# Patient Record
Sex: FEMALE | Race: BLACK OR AFRICAN AMERICAN | NOT HISPANIC OR LATINO | Employment: UNEMPLOYED | URBAN - METROPOLITAN AREA
[De-identification: names, ages, dates, MRNs, and addresses within clinical notes are randomized per-mention and may not be internally consistent; named-entity substitution may affect disease eponyms.]

---

## 2022-06-27 ENCOUNTER — TELEPHONE (OUTPATIENT)
Dept: HEMATOLOGY/ONCOLOGY | Facility: CLINIC | Age: 62
End: 2022-06-27
Payer: MEDICAID

## 2022-06-27 NOTE — TELEPHONE ENCOUNTER
Spoke to the patient regarding referral. Appointment date,time,and location provided. All questions answered. TTRN

## 2022-06-29 ENCOUNTER — OFFICE VISIT (OUTPATIENT)
Dept: HEMATOLOGY/ONCOLOGY | Facility: CLINIC | Age: 62
End: 2022-06-29
Payer: MEDICAID

## 2022-06-29 VITALS — RESPIRATION RATE: 18 BRPM | HEIGHT: 61 IN | BODY MASS INDEX: 27.5 KG/M2 | WEIGHT: 145.69 LBS

## 2022-06-29 DIAGNOSIS — C20 RECTAL CANCER: Primary | ICD-10-CM

## 2022-06-29 PROCEDURE — 3008F BODY MASS INDEX DOCD: CPT | Mod: CPTII,S$GLB,, | Performed by: INTERNAL MEDICINE

## 2022-06-29 PROCEDURE — 99205 PR OFFICE/OUTPT VISIT, NEW, LEVL V, 60-74 MIN: ICD-10-PCS | Mod: S$GLB,,, | Performed by: INTERNAL MEDICINE

## 2022-06-29 PROCEDURE — 3008F PR BODY MASS INDEX (BMI) DOCUMENTED: ICD-10-PCS | Mod: CPTII,S$GLB,, | Performed by: INTERNAL MEDICINE

## 2022-06-29 PROCEDURE — 99205 OFFICE O/P NEW HI 60 MIN: CPT | Mod: S$GLB,,, | Performed by: INTERNAL MEDICINE

## 2022-06-29 RX ORDER — NITROGLYCERIN 0.4 MG/1
0.4 TABLET SUBLINGUAL
COMMUNITY
Start: 2021-07-18 | End: 2023-03-16

## 2022-06-29 RX ORDER — FLUCONAZOLE 200 MG/1
TABLET ORAL
COMMUNITY
Start: 2022-06-28 | End: 2022-08-09

## 2022-06-29 RX ORDER — CIPROFLOXACIN 250 MG/1
250 TABLET, FILM COATED ORAL 2 TIMES DAILY
COMMUNITY
Start: 2022-06-27 | End: 2022-08-09

## 2022-06-29 RX ORDER — HYDROCODONE BITARTRATE AND ACETAMINOPHEN 5; 325 MG/1; MG/1
1 TABLET ORAL EVERY 6 HOURS PRN
COMMUNITY
Start: 2022-06-27 | End: 2022-07-14 | Stop reason: SDUPTHER

## 2022-06-29 RX ORDER — FERROUS SULFATE 324(65)MG
TABLET, DELAYED RELEASE (ENTERIC COATED) ORAL
COMMUNITY
Start: 2022-06-21 | End: 2023-03-16

## 2022-06-29 RX ORDER — NAPROXEN SODIUM 220 MG/1
81 TABLET, FILM COATED ORAL
COMMUNITY
Start: 2021-07-18

## 2022-06-29 NOTE — PROGRESS NOTES
MEDICAL ONCOLOGY INITIAL CONSULTATION NOTE    Patient ID: Yulissa Munguia is a 61 y.o. female.    Chief Complaint: Rectal cancer    HPI:  Patient is a 61-year-old female who was previously diagnosed with rectal cancer about a year back and was seen and evaluated by [facility] with recommendations for chemo XRT for for presumed rectal cancer stage III A. Patient however was reluctant to get any treatment and did not pursue any medical care since then.  She now was referred by her primary care provider after patient was dismissed from [facility].  She presents to our clinic today with her daughters for further evaluation.  She still does not seem to be interested in pursuing chemo or radiation options but would like to have a discussion about her prognosis.  She also reports having a rectal tube placed recently in [place] but I have no records to suggest that it was done recently and for what indication.  She continues to smoke every day and does not seem to actually believe in her cancer diagnosis.      Social History     Socioeconomic History    Marital status:    Tobacco Use    Smoking status: Current Every Day Smoker     Years: 20.00     Types: Cigarettes    Smokeless tobacco: Never Used    Tobacco comment: would be interested in smoking cessation in the future     No past surgical history on file.      Review of systems:  Review of Systems   Constitutional: Positive for activity change and appetite change. Negative for chills, diaphoresis, fatigue and unexpected weight change.   HENT: Negative for congestion, facial swelling, hearing loss, mouth sores, trouble swallowing and voice change.    Eyes: Negative for photophobia, pain, discharge and itching.   Respiratory: Negative for apnea, cough, choking, chest tightness and shortness of breath.    Cardiovascular: Negative for chest pain, palpitations and leg swelling.   Gastrointestinal: Positive for rectal pain. Negative for  abdominal distention, abdominal pain, anal bleeding and blood in stool.   Endocrine: Negative for cold intolerance, heat intolerance, polydipsia and polyphagia.   Genitourinary: Negative for difficulty urinating, dysuria, flank pain and hematuria.   Musculoskeletal: Negative for arthralgias, back pain, joint swelling, myalgias, neck pain and neck stiffness.   Skin: Negative for color change, pallor and wound.   Allergic/Immunologic: Negative for environmental allergies, food allergies and immunocompromised state.   Neurological: Negative for dizziness, seizures, facial asymmetry, speech difficulty, light-headedness, numbness and headaches.   Hematological: Negative for adenopathy. Does not bruise/bleed easily.   Psychiatric/Behavioral: Negative for agitation, behavioral problems, confusion, decreased concentration and sleep disturbance.             Physical Exam  Vitals and nursing note reviewed.   Constitutional:       General: She is not in acute distress.     Appearance: Normal appearance. She is not ill-appearing.   HENT:      Head: Normocephalic and atraumatic.      Nose: No congestion or rhinorrhea.   Eyes:      General: No scleral icterus.     Extraocular Movements: Extraocular movements intact.      Pupils: Pupils are equal, round, and reactive to light.   Cardiovascular:      Rate and Rhythm: Normal rate and regular rhythm.      Pulses: Normal pulses.      Heart sounds: Normal heart sounds. No murmur heard.    No gallop.   Pulmonary:      Effort: Pulmonary effort is normal. No respiratory distress.      Breath sounds: Normal breath sounds. No stridor. No wheezing or rhonchi.   Abdominal:      General: Bowel sounds are normal. There is no distension.      Palpations: There is no mass.      Tenderness: There is no abdominal tenderness. There is no guarding.   Musculoskeletal:         General: No swelling, tenderness, deformity or signs of injury. Normal range of motion.      Cervical back: Normal range of  motion and neck supple. No rigidity. No muscular tenderness.      Right lower leg: No edema.      Left lower leg: No edema.   Skin:     General: Skin is warm.      Coloration: Skin is not jaundiced or pale.      Findings: No bruising or lesion.   Neurological:      General: No focal deficit present.      Mental Status: She is alert and oriented to person, place, and time.      Cranial Nerves: No cranial nerve deficit.      Sensory: No sensory deficit.      Motor: No weakness.      Gait: Gait normal.   Psychiatric:         Mood and Affect: Mood normal.         Behavior: Behavior normal.         Thought Content: Thought content normal.       Vitals:    06/29/22 1145   Resp: 18   Body surface area is 1.69 meters squared.    Assessment/Plan:      ECOG 1     1. Rectal Cancer:    == Initially diagnosed in July 2021 as stage IIIA rectal cancer.  She has not followed and not persued any treatment since then as she is skeptical about chemotherapy and side effects of radiation.  She believes an alternate treatment options which she has likely been doing since that time.  After recent dismissal from [facility] she presents to us for further discussion of treatment and management options.  == I discussed with her that restaging scan would be needed to accurately determine her current cancer stage and based on that would recommend treatment with either palliative or curative intent.  She has neglected rectal cancer over the last 1 year at this time and I am strongly suspecting metastasis is with stage IV disease.  I discussed with her that this is an Oncology Clinic and we would only talk about standard treatment with chemotherapy plus-minus radiation options.  Patient is more interested interested in alternative treatment discussions and I discussed with her that this is not something we do in this clinic.  She voiced understanding but would like to get her PET scan done and then return to clinic for further  discussion.  She is not currently agreeable to chemotherapy but would like to discuss this further once PET scan results are back.     Plan:  PET scan    RTC in 2 weeks for MD visit with labs for f/up    Future Appointments   Date Time Provider Department Center   7/13/2022  9:00 AM [facility]   7/14/2022  9:20 AM Nikunj Herndon MD [facility]         Total time spent in counseling and discussion about further management options including relevant lab work, treatment,  prognosis, medications and intended side effects was more than 60 minutes. More than 50% of the time was spent on counseling and coordination of care.  This includes face to face time and non-face to face time preparing to see the patient (eg, review of tests), Obtaining and/or reviewing separately obtained history, Documenting clinical information in the electronic or other health record, Independently interpreting resultsand communicating results to the patient/family/caregiver, or Care coordination.

## 2022-07-01 ENCOUNTER — TELEPHONE (OUTPATIENT)
Dept: HEMATOLOGY/ONCOLOGY | Facility: CLINIC | Age: 62
End: 2022-07-01
Payer: MEDICAID

## 2022-07-01 PROBLEM — C20 RECTAL CANCER: Status: ACTIVE | Noted: 2022-07-01

## 2022-07-05 ENCOUNTER — TELEPHONE (OUTPATIENT)
Dept: HEMATOLOGY/ONCOLOGY | Facility: CLINIC | Age: 62
End: 2022-07-05
Payer: MEDICAID

## 2022-07-08 DIAGNOSIS — C20 RECTAL CANCER: Primary | ICD-10-CM

## 2022-07-13 ENCOUNTER — CLINICAL SUPPORT (OUTPATIENT)
Dept: HEMATOLOGY/ONCOLOGY | Facility: CLINIC | Age: 62
End: 2022-07-13
Payer: MEDICAID

## 2022-07-13 DIAGNOSIS — C20 RECTAL CANCER: ICD-10-CM

## 2022-07-13 LAB
ALBUMIN SERPL BCP-MCNC: 3.3 G/DL (ref 3.4–5)
ALBUMIN/GLOBULIN RATIO: 0.85 RATIO (ref 1.1–1.8)
ALP SERPL-CCNC: 67 U/L (ref 46–116)
ALT SERPL W P-5'-P-CCNC: 23 U/L (ref 12–78)
ANION GAP SERPL CALC-SCNC: 4 MMOL/L (ref 3–11)
ANISOCYTOSIS: ABNORMAL
AST SERPL-CCNC: 18 U/L (ref 15–37)
BANDS: 1 % (ref 0–5)
BILIRUB SERPL-MCNC: 0.2 MG/DL (ref 0–1)
BUN SERPL-MCNC: 8 MG/DL (ref 7–18)
BUN/CREAT SERPL: 13.55 RATIO (ref 7–18)
CALCIUM SERPL-MCNC: 9.1 MG/DL (ref 8.8–10.5)
CELLS COUNTED: 100
CHLORIDE SERPL-SCNC: 106 MMOL/L (ref 100–108)
CO2 SERPL-SCNC: 29 MMOL/L (ref 21–32)
CREAT SERPL-MCNC: 0.59 MG/DL (ref 0.55–1.02)
EOSINOPHIL NFR BLD: 9 % (ref 1–3)
ERYTHROCYTE [DISTWIDTH] IN BLOOD BY AUTOMATED COUNT: 20.8 % (ref 12.5–18)
GFR ESTIMATION: > 60
GLOBULIN: 3.9 G/DL (ref 2.3–3.5)
GLUCOSE SERPL-MCNC: 87 MG/DL (ref 70–110)
HCT VFR BLD AUTO: 26.8 % (ref 37–47)
HGB BLD-MCNC: 8.1 G/DL (ref 12–16)
HYPOCHROMIA BLD QL SMEAR: ABNORMAL
LYMPHOCYTES NFR BLD: 25 % (ref 25–40)
MCH RBC QN AUTO: 23.1 PG (ref 27–31.2)
MCHC RBC AUTO-ENTMCNC: 30.2 G/DL (ref 31.8–35.4)
MCV RBC AUTO: 76.6 FL (ref 80–97)
MICROCYTES BLD QL SMEAR: ABNORMAL
MONOCYTES NFR BLD: 12 % (ref 1–15)
NEUTROPHILS # BLD AUTO: 2.9 10*3/UL (ref 1.8–7.7)
NEUTROPHILS NFR BLD: 53 % (ref 37–80)
NUCLEATED RED BLOOD CELLS: 0 %
PLATELETS: 438 10*3/UL (ref 142–424)
POTASSIUM SERPL-SCNC: 4 MMOL/L (ref 3.6–5.2)
PROT SERPL-MCNC: 7.2 G/DL (ref 6.4–8.2)
RBC # BLD AUTO: 3.5 10*6/UL (ref 4.2–5.4)
SMALL PLATELETS BLD QL SMEAR: ADEQUATE
SODIUM BLD-SCNC: 139 MMOL/L (ref 135–145)
TARGETS: ABNORMAL
WBC # BLD: 5.4 10*3/UL (ref 4.6–10.2)

## 2022-07-14 ENCOUNTER — TELEPHONE (OUTPATIENT)
Dept: HEMATOLOGY/ONCOLOGY | Facility: CLINIC | Age: 62
End: 2022-07-14

## 2022-07-14 ENCOUNTER — OFFICE VISIT (OUTPATIENT)
Dept: HEMATOLOGY/ONCOLOGY | Facility: CLINIC | Age: 62
End: 2022-07-14
Payer: MEDICAID

## 2022-07-14 VITALS
WEIGHT: 148 LBS | SYSTOLIC BLOOD PRESSURE: 131 MMHG | BODY MASS INDEX: 27.94 KG/M2 | RESPIRATION RATE: 18 BRPM | DIASTOLIC BLOOD PRESSURE: 72 MMHG | OXYGEN SATURATION: 98 % | TEMPERATURE: 98 F | HEART RATE: 72 BPM | HEIGHT: 61 IN

## 2022-07-14 DIAGNOSIS — C20 RECTAL CANCER: Primary | ICD-10-CM

## 2022-07-14 DIAGNOSIS — K62.89 RECTAL PAIN: ICD-10-CM

## 2022-07-14 PROCEDURE — 3075F PR MOST RECENT SYSTOLIC BLOOD PRESS GE 130-139MM HG: ICD-10-PCS | Mod: CPTII,S$GLB,, | Performed by: INTERNAL MEDICINE

## 2022-07-14 PROCEDURE — 3075F SYST BP GE 130 - 139MM HG: CPT | Mod: CPTII,S$GLB,, | Performed by: INTERNAL MEDICINE

## 2022-07-14 PROCEDURE — 99215 PR OFFICE/OUTPT VISIT, EST, LEVL V, 40-54 MIN: ICD-10-PCS | Mod: S$GLB,,, | Performed by: INTERNAL MEDICINE

## 2022-07-14 PROCEDURE — 3008F BODY MASS INDEX DOCD: CPT | Mod: CPTII,S$GLB,, | Performed by: INTERNAL MEDICINE

## 2022-07-14 PROCEDURE — 1159F MED LIST DOCD IN RCRD: CPT | Mod: CPTII,S$GLB,, | Performed by: INTERNAL MEDICINE

## 2022-07-14 PROCEDURE — 3078F PR MOST RECENT DIASTOLIC BLOOD PRESSURE < 80 MM HG: ICD-10-PCS | Mod: CPTII,S$GLB,, | Performed by: INTERNAL MEDICINE

## 2022-07-14 PROCEDURE — 99215 OFFICE O/P EST HI 40 MIN: CPT | Mod: S$GLB,,, | Performed by: INTERNAL MEDICINE

## 2022-07-14 PROCEDURE — 1159F PR MEDICATION LIST DOCUMENTED IN MEDICAL RECORD: ICD-10-PCS | Mod: CPTII,S$GLB,, | Performed by: INTERNAL MEDICINE

## 2022-07-14 PROCEDURE — 3008F PR BODY MASS INDEX (BMI) DOCUMENTED: ICD-10-PCS | Mod: CPTII,S$GLB,, | Performed by: INTERNAL MEDICINE

## 2022-07-14 PROCEDURE — 3078F DIAST BP <80 MM HG: CPT | Mod: CPTII,S$GLB,, | Performed by: INTERNAL MEDICINE

## 2022-07-14 RX ORDER — HYDROCODONE BITARTRATE AND ACETAMINOPHEN 5; 325 MG/1; MG/1
1 TABLET ORAL EVERY 8 HOURS PRN
Qty: 30 TABLET | Refills: 0 | Status: SHIPPED | OUTPATIENT
Start: 2022-07-14 | End: 2022-07-25 | Stop reason: SDUPTHER

## 2022-07-14 RX ORDER — HYDROCODONE BITARTRATE AND ACETAMINOPHEN 5; 325 MG/1; MG/1
1 TABLET ORAL EVERY 8 HOURS PRN
Qty: 30 TABLET | Refills: 0 | Status: SHIPPED | OUTPATIENT
Start: 2022-07-14 | End: 2022-07-14 | Stop reason: SDUPTHER

## 2022-07-14 NOTE — PROGRESS NOTES
MEDICAL ONCOLOGY FOLLOW UP CONSULTATION NOTE    Patient ID: Yulissa Munguia is a 61 y.o. female.    Chief Complaint: Rectal cancer    HPI:  Patient is a 61-year-old female who was previously diagnosed with rectal cancer about a year back and was seen and evaluated by [facility] with recommendations for chemo XRT for for presumed rectal cancer stage III A. Patient however was reluctant to get any treatment and did not pursue any medical care since then.  She now was referred by her primary care provider after patient was dismissed from [facility].  She presents to our clinic today with her daughters for further evaluation.  She still does not seem to be interested in pursuing chemo or radiation options but would like to have a discussion about her prognosis.  She also reports having a rectal tube placed recently in [place] but I have no records to suggest that it was done recently and for what indication.  She continues to smoke every day and does not seem to actually believe in her cancer diagnosis.      Social History     Socioeconomic History    Marital status:    Tobacco Use    Smoking status: Current Every Day Smoker     Years: 20.00     Types: Cigarettes    Smokeless tobacco: Never Used    Tobacco comment: would be interested in smoking cessation in the future   Substance and Sexual Activity    Alcohol use: Not Currently    Drug use: Not Currently     Past Surgical History:   Procedure Laterality Date    ADENOIDECTOMY       SECTION      TONSILLECTOMY           Review of systems:  Review of Systems   Constitutional: Positive for activity change and appetite change. Negative for chills, diaphoresis, fatigue and unexpected weight change.   HENT: Negative for congestion, facial swelling, hearing loss, mouth sores, trouble swallowing and voice change.    Eyes: Negative for photophobia, pain, discharge and itching.   Respiratory: Negative for apnea, cough,  choking, chest tightness and shortness of breath.    Cardiovascular: Negative for chest pain, palpitations and leg swelling.   Gastrointestinal: Positive for rectal pain. Negative for abdominal distention, abdominal pain, anal bleeding and blood in stool.   Endocrine: Negative for cold intolerance, heat intolerance, polydipsia and polyphagia.   Genitourinary: Negative for difficulty urinating, dysuria, flank pain and hematuria.   Musculoskeletal: Negative for arthralgias, back pain, joint swelling, myalgias, neck pain and neck stiffness.   Skin: Negative for color change, pallor and wound.   Allergic/Immunologic: Negative for environmental allergies, food allergies and immunocompromised state.   Neurological: Negative for dizziness, seizures, facial asymmetry, speech difficulty, light-headedness, numbness and headaches.   Hematological: Negative for adenopathy. Does not bruise/bleed easily.   Psychiatric/Behavioral: Negative for agitation, behavioral problems, confusion, decreased concentration and sleep disturbance.             Physical Exam  Vitals and nursing note reviewed.   Constitutional:       General: She is not in acute distress.     Appearance: Normal appearance. She is not ill-appearing.   HENT:      Head: Normocephalic and atraumatic.      Nose: No congestion or rhinorrhea.   Eyes:      General: No scleral icterus.     Extraocular Movements: Extraocular movements intact.      Pupils: Pupils are equal, round, and reactive to light.   Cardiovascular:      Rate and Rhythm: Normal rate and regular rhythm.      Pulses: Normal pulses.      Heart sounds: Normal heart sounds. No murmur heard.    No gallop.   Pulmonary:      Effort: Pulmonary effort is normal. No respiratory distress.      Breath sounds: Normal breath sounds. No stridor. No wheezing or rhonchi.   Abdominal:      General: Bowel sounds are normal. There is no distension.      Palpations: There is no mass.      Tenderness: There is no abdominal  tenderness. There is no guarding.   Musculoskeletal:         General: No swelling, tenderness, deformity or signs of injury. Normal range of motion.      Cervical back: Normal range of motion and neck supple. No rigidity. No muscular tenderness.      Right lower leg: No edema.      Left lower leg: No edema.   Skin:     General: Skin is warm.      Coloration: Skin is not jaundiced or pale.      Findings: No bruising or lesion.   Neurological:      General: No focal deficit present.      Mental Status: She is alert and oriented to person, place, and time.      Cranial Nerves: No cranial nerve deficit.      Sensory: No sensory deficit.      Motor: No weakness.      Gait: Gait normal.   Psychiatric:         Mood and Affect: Mood normal.         Behavior: Behavior normal.         Thought Content: Thought content normal.       Vitals:    07/14/22 0934   BP: 131/72   Pulse: 72   Resp: 18   Temp: 98 °F (36.7 °C)   Body surface area is 1.7 meters squared.    Assessment/Plan:      ECOG 1     1. Rectal Cancer:    == Initially diagnosed in July 2021 as stage IIIA rectal cancer.  She has not followed and not pursued any treatment since then as she is skeptical about chemotherapy and side effects of radiation.  She believes an alternate treatment options which she has likely been doing since that time.  After recent dismissal from [facility] she presents to us for further discussion of treatment and management options.  == I discussed with her that restaging scan would be needed to accurately determine her current cancer stage and based on that would recommend treatment with either palliative or curative intent.  She has neglected rectal cancer over the last 1 year at this time and I am strongly suspecting metastasis is with stage IV disease.  I discussed with her that this is an Oncology Clinic and we would only talk about standard treatment with chemotherapy plus-minus radiation options.  Patient is more  interested interested in alternative treatment discussions and I discussed with her that this is not something we do in this clinic.  She voiced understanding but would like to get her PET scan done and then return to clinic for further discussion.  She is not currently agreeable to chemotherapy but would like to discuss this further once PET scan results are back.  == 7/14/22:  Patient underwent PET scan 07/08/2022 which showed continued thickening of the rectal wall extending into the anal region with increased radiotracer uptake.  This continues to extend over about 6 cm in length.  Increased radiotracer uptake remains present in the area with an SUV of approximately 22.5 compared 24.4 on the previous examination.  Indistinctness of the fat planes posteriorly remain present consistent with extra colonic extension.  There continues to be hypermetabolic lymph nodes in the inguinal region bilaterally short axis diameter is on the order of about 12 mm are present with SUVs on the order of approximately 3.4.  A mildly hypermetabolic lymph node remains present along the left sidewall of the pelvis having short axis diameter of approximately 7 mm today versus 10 mm on the previous examination.  The SUV in this area is approximately 2.1 versus 5.8 on previous exam. Considering her original diagnosis as Stage IIIA with outside oncologist, this PET scan shows it is likely, T4a with extracolonic extension and N1b with 2-3 regional LN involvement. I discussed with her about chemo-XRT  and would like to talk to radiation oncology first before she decides to pursue any treatment. I will hence make referral to Rad-onc in this regard       2. Pain Management:    == I discussed with her that we have a very strict policy for pain medications and she will need to sign a pain contract. If she decides to pursue treatment for her rectal cancer then she can be started on pain medications. XRT could also help in this regard  == Norco  10/325 mg PO BID prn is what she seems to be taking at this time, after her last prescription from her ER visit.      Plan:  Referral to Rad-oncology  Port placement      RTC in 1 week  for MD/NP  visit with labs for chemo education      Total time spent in counseling and discussion about further management options including relevant lab work, treatment,  prognosis, medications and intended side effects was more than 60 minutes. More than 50% of the time was spent on counseling and coordination of care.  This includes face to face time and non-face to face time preparing to see the patient (eg, review of tests), Obtaining and/or reviewing separately obtained history, Documenting clinical information in the electronic or other health record, Independently interpreting resultsand communicating results to the patient/family/caregiver, or Care coordination.

## 2022-07-21 ENCOUNTER — OFFICE VISIT (OUTPATIENT)
Dept: HEMATOLOGY/ONCOLOGY | Facility: CLINIC | Age: 62
End: 2022-07-21
Payer: MEDICAID

## 2022-07-21 VITALS
DIASTOLIC BLOOD PRESSURE: 66 MMHG | OXYGEN SATURATION: 99 % | HEIGHT: 61 IN | TEMPERATURE: 98 F | HEART RATE: 80 BPM | BODY MASS INDEX: 27.43 KG/M2 | RESPIRATION RATE: 18 BRPM | WEIGHT: 145.31 LBS | SYSTOLIC BLOOD PRESSURE: 100 MMHG

## 2022-07-21 DIAGNOSIS — C20 RECTAL CANCER: Primary | ICD-10-CM

## 2022-07-21 DIAGNOSIS — K62.89 RECTAL PAIN: ICD-10-CM

## 2022-07-21 LAB
BARBITURATE SCREEN URINE: NEGATIVE
BENZODIAZ UR QL SCN: NEGATIVE
BENZOYLECGONINE, URINE: NEGATIVE
CANNABINOID SCREEN URINE: POSITIVE
CLARITHRO TITR SBT: NEGATIVE {TITER}
METHADONE UR QL SCN: NEGATIVE
OPIATES UR QL SCN: POSITIVE
PCP UR QL SCN: NEGATIVE
PH, URINE (TOX): 6.5 (ref 5–8)

## 2022-07-21 PROCEDURE — 3078F PR MOST RECENT DIASTOLIC BLOOD PRESSURE < 80 MM HG: ICD-10-PCS | Mod: CPTII,S$GLB,, | Performed by: NURSE PRACTITIONER

## 2022-07-21 PROCEDURE — 1159F MED LIST DOCD IN RCRD: CPT | Mod: CPTII,S$GLB,, | Performed by: NURSE PRACTITIONER

## 2022-07-21 PROCEDURE — 3008F BODY MASS INDEX DOCD: CPT | Mod: CPTII,S$GLB,, | Performed by: NURSE PRACTITIONER

## 2022-07-21 PROCEDURE — 3078F DIAST BP <80 MM HG: CPT | Mod: CPTII,S$GLB,, | Performed by: NURSE PRACTITIONER

## 2022-07-21 PROCEDURE — 3008F PR BODY MASS INDEX (BMI) DOCUMENTED: ICD-10-PCS | Mod: CPTII,S$GLB,, | Performed by: NURSE PRACTITIONER

## 2022-07-21 PROCEDURE — 99214 OFFICE O/P EST MOD 30 MIN: CPT | Mod: S$GLB,,, | Performed by: NURSE PRACTITIONER

## 2022-07-21 PROCEDURE — 99214 PR OFFICE/OUTPT VISIT, EST, LEVL IV, 30-39 MIN: ICD-10-PCS | Mod: S$GLB,,, | Performed by: NURSE PRACTITIONER

## 2022-07-21 PROCEDURE — 3074F PR MOST RECENT SYSTOLIC BLOOD PRESSURE < 130 MM HG: ICD-10-PCS | Mod: CPTII,S$GLB,, | Performed by: NURSE PRACTITIONER

## 2022-07-21 PROCEDURE — 3074F SYST BP LT 130 MM HG: CPT | Mod: CPTII,S$GLB,, | Performed by: NURSE PRACTITIONER

## 2022-07-21 PROCEDURE — 1159F PR MEDICATION LIST DOCUMENTED IN MEDICAL RECORD: ICD-10-PCS | Mod: CPTII,S$GLB,, | Performed by: NURSE PRACTITIONER

## 2022-07-21 NOTE — PROGRESS NOTES
MEDICAL ONCOLOGY FOLLOW UP CONSULTATION NOTE    Patient ID: Yulissa Munguia is a 61 y.o. female.    Chief Complaint: Rectal cancer    HPI:  Patient is a 61-year-old female who was previously diagnosed with rectal cancer about a year back and was seen and evaluated by [facility] with recommendations for chemo XRT for for presumed rectal cancer stage III A. Patient however was reluctant to get any treatment and did not pursue any medical care since then.  She now was referred by her primary care provider after patient was dismissed from [facility].  She presents to our clinic today with her daughters for further evaluation.  She still does not seem to be interested in pursuing chemo or radiation options but would like to have a discussion about her prognosis.  She also reports having a rectal tube placed recently in [place] but I have no records to suggest that it was done recently and for what indication.  She continues to smoke every day and does not seem to actually believe in her cancer diagnosis.      Social History     Socioeconomic History    Marital status:    Tobacco Use    Smoking status: Current Every Day Smoker     Years: 20.00     Types: Cigarettes    Smokeless tobacco: Never Used    Tobacco comment: would be interested in smoking cessation in the future   Substance and Sexual Activity    Alcohol use: Not Currently    Drug use: Not Currently     Past Surgical History:   Procedure Laterality Date    ADENOIDECTOMY       SECTION      TONSILLECTOMY           Review of systems:  Review of Systems   Constitutional: Positive for activity change and appetite change. Negative for chills, diaphoresis, fatigue and unexpected weight change.   HENT: Negative for congestion, facial swelling, hearing loss, mouth sores, trouble swallowing and voice change.    Eyes: Negative for photophobia, pain, discharge and itching.   Respiratory: Negative for apnea, cough,  choking, chest tightness and shortness of breath.    Cardiovascular: Negative for chest pain, palpitations and leg swelling.   Gastrointestinal: Positive for rectal pain. Negative for abdominal distention, abdominal pain, anal bleeding and blood in stool.   Endocrine: Negative for cold intolerance, heat intolerance, polydipsia and polyphagia.   Genitourinary: Negative for difficulty urinating, dysuria, flank pain and hematuria.   Musculoskeletal: Negative for arthralgias, back pain, joint swelling, myalgias, neck pain and neck stiffness.   Skin: Negative for color change, pallor and wound.   Allergic/Immunologic: Negative for environmental allergies, food allergies and immunocompromised state.   Neurological: Negative for dizziness, seizures, facial asymmetry, speech difficulty, light-headedness, numbness and headaches.   Hematological: Negative for adenopathy. Does not bruise/bleed easily.   Psychiatric/Behavioral: Negative for agitation, behavioral problems, confusion, decreased concentration and sleep disturbance.             Physical Exam  Vitals and nursing note reviewed.   Constitutional:       General: She is not in acute distress.     Appearance: Normal appearance. She is not ill-appearing.   HENT:      Head: Normocephalic and atraumatic.      Nose: No congestion or rhinorrhea.   Eyes:      General: No scleral icterus.     Extraocular Movements: Extraocular movements intact.      Pupils: Pupils are equal, round, and reactive to light.   Cardiovascular:      Rate and Rhythm: Normal rate and regular rhythm.      Pulses: Normal pulses.      Heart sounds: Normal heart sounds. No murmur heard.    No gallop.   Pulmonary:      Effort: Pulmonary effort is normal. No respiratory distress.      Breath sounds: Normal breath sounds. No stridor. No wheezing or rhonchi.   Abdominal:      General: Bowel sounds are normal. There is no distension.      Palpations: There is no mass.      Tenderness: There is no abdominal  tenderness. There is no guarding.   Musculoskeletal:         General: No swelling, tenderness, deformity or signs of injury. Normal range of motion.      Cervical back: Normal range of motion and neck supple. No rigidity. No muscular tenderness.      Right lower leg: No edema.      Left lower leg: No edema.   Skin:     General: Skin is warm.      Coloration: Skin is not jaundiced or pale.      Findings: No bruising or lesion.   Neurological:      General: No focal deficit present.      Mental Status: She is alert and oriented to person, place, and time.      Cranial Nerves: No cranial nerve deficit.      Sensory: No sensory deficit.      Motor: No weakness.      Gait: Gait normal.   Psychiatric:         Mood and Affect: Mood normal.         Behavior: Behavior normal.         Thought Content: Thought content normal.       There were no vitals filed for this visit.There is no height or weight on file to calculate BSA.    Assessment/Plan:      ECOG 1     1. Rectal Cancer:    == Initially diagnosed in July 2021 as stage IIIA rectal cancer.  She has not followed and not pursued any treatment since then as she is skeptical about chemotherapy and side effects of radiation.  She believes an alternate treatment options which she has likely been doing since that time.  After recent dismissal from [facility] she presents to us for further discussion of treatment and management options.  == I discussed with her that restaging scan would be needed to accurately determine her current cancer stage and based on that would recommend treatment with either palliative or curative intent.  She has neglected rectal cancer over the last 1 year at this time and I am strongly suspecting metastasis is with stage IV disease.  I discussed with her that this is an Oncology Clinic and we would only talk about standard treatment with chemotherapy plus-minus radiation options.  Patient is more interested interested in alternative  treatment discussions and I discussed with her that this is not something we do in this clinic.  She voiced understanding but would like to get her PET scan done and then return to clinic for further discussion.  She is not currently agreeable to chemotherapy but would like to discuss this further once PET scan results are back.  == 7/14/22:  Patient underwent PET scan 07/08/2022 which showed continued thickening of the rectal wall extending into the anal region with increased radiotracer uptake.  This continues to extend over about 6 cm in length.  Increased radiotracer uptake remains present in the area with an SUV of approximately 22.5 compared 24.4 on the previous examination.  Indistinctness of the fat planes posteriorly remain present consistent with extra colonic extension.  There continues to be hypermetabolic lymph nodes in the inguinal region bilaterally short axis diameter is on the order of about 12 mm are present with SUVs on the order of approximately 3.4.  A mildly hypermetabolic lymph node remains present along the left sidewall of the pelvis having short axis diameter of approximately 7 mm today versus 10 mm on the previous examination.  The SUV in this area is approximately 2.1 versus 5.8 on previous exam. Considering her original diagnosis as Stage IIIA with outside oncologist, this PET scan shows it is likely, T4a with extracolonic extension and N1b with 2-3 regional LN involvement. I discussed with her about chemo-XRT  and would like to talk to radiation oncology first before she decides to pursue any treatment. I will hence make referral to Rad-onc in this regard  ==07/21/2022: Patient reports that she would like to pursue chemoradiaton. She has appointment today for radiation evaluation with Dr. Contreras. I discussed with Dr. Herndon and if she is candidate for radiation plan is for concurrent XRT with 5-fu. She will need mediport placement. Referral placed.       2. Pain Management:    == I  discussed with her that we have a very strict policy for pain medications and she will need to sign a pain contract. If she decides to pursue treatment for her rectal cancer then she can be started on pain medications. XRT could also help in this regard  == Norco 10/325 mg PO BID prn is what she seems to be taking at this time, after her last prescription from her ER visit.      Plan:  Referral to Rad-oncology  Port placement  RTC 1 week for chemo education for 5-fu pending XRT evaluation    RTC in 1 week  for MD/NP  visit with labs for chemo education      Total time spent in counseling and discussion about further management options including relevant lab work, treatment,  prognosis, medications and intended side effects was more than 60 minutes. More than 50% of the time was spent on counseling and coordination of care.  This includes face to face time and non-face to face time preparing to see the patient (eg, review of tests), Obtaining and/or reviewing separately obtained history, Documenting clinical information in the electronic or other health record, Independently interpreting resultsand communicating results to the patient/family/caregiver, or Care coordination.

## 2022-07-25 ENCOUNTER — TELEPHONE (OUTPATIENT)
Dept: HEMATOLOGY/ONCOLOGY | Facility: CLINIC | Age: 62
End: 2022-07-25
Payer: MEDICAID

## 2022-07-25 DIAGNOSIS — C20 RECTAL CANCER: ICD-10-CM

## 2022-07-25 DIAGNOSIS — K62.89 RECTAL PAIN: ICD-10-CM

## 2022-07-25 RX ORDER — HYDROCODONE BITARTRATE AND ACETAMINOPHEN 5; 325 MG/1; MG/1
1 TABLET ORAL EVERY 8 HOURS PRN
Qty: 30 TABLET | Refills: 0 | Status: SHIPPED | OUTPATIENT
Start: 2022-07-25 | End: 2022-08-04 | Stop reason: SDUPTHER

## 2022-07-25 NOTE — TELEPHONE ENCOUNTER
Called patient to let her know that her Rx was filled and she can call [facility] to see if it's ready for . Patient voiced understanding.

## 2022-07-28 ENCOUNTER — OFFICE VISIT (OUTPATIENT)
Dept: HEMATOLOGY/ONCOLOGY | Facility: CLINIC | Age: 62
End: 2022-07-28
Payer: MEDICAID

## 2022-07-28 ENCOUNTER — SPECIALTY PHARMACY (OUTPATIENT)
Dept: PHARMACY | Facility: CLINIC | Age: 62
End: 2022-07-28
Payer: MEDICAID

## 2022-07-28 VITALS
HEIGHT: 61 IN | DIASTOLIC BLOOD PRESSURE: 68 MMHG | WEIGHT: 148 LBS | SYSTOLIC BLOOD PRESSURE: 108 MMHG | OXYGEN SATURATION: 98 % | BODY MASS INDEX: 27.94 KG/M2 | HEART RATE: 82 BPM | RESPIRATION RATE: 16 BRPM

## 2022-07-28 DIAGNOSIS — K62.89 RECTAL PAIN: ICD-10-CM

## 2022-07-28 DIAGNOSIS — C20 RECTAL CANCER: Primary | ICD-10-CM

## 2022-07-28 PROCEDURE — 1159F MED LIST DOCD IN RCRD: CPT | Mod: CPTII,S$GLB,, | Performed by: NURSE PRACTITIONER

## 2022-07-28 PROCEDURE — 3008F PR BODY MASS INDEX (BMI) DOCUMENTED: ICD-10-PCS | Mod: CPTII,S$GLB,, | Performed by: NURSE PRACTITIONER

## 2022-07-28 PROCEDURE — 99215 OFFICE O/P EST HI 40 MIN: CPT | Mod: S$GLB,,, | Performed by: NURSE PRACTITIONER

## 2022-07-28 PROCEDURE — 1159F PR MEDICATION LIST DOCUMENTED IN MEDICAL RECORD: ICD-10-PCS | Mod: CPTII,S$GLB,, | Performed by: NURSE PRACTITIONER

## 2022-07-28 PROCEDURE — 3008F BODY MASS INDEX DOCD: CPT | Mod: CPTII,S$GLB,, | Performed by: NURSE PRACTITIONER

## 2022-07-28 PROCEDURE — 99215 PR OFFICE/OUTPT VISIT, EST, LEVL V, 40-54 MIN: ICD-10-PCS | Mod: S$GLB,,, | Performed by: NURSE PRACTITIONER

## 2022-07-28 PROCEDURE — 3074F SYST BP LT 130 MM HG: CPT | Mod: CPTII,S$GLB,, | Performed by: NURSE PRACTITIONER

## 2022-07-28 PROCEDURE — 3074F PR MOST RECENT SYSTOLIC BLOOD PRESSURE < 130 MM HG: ICD-10-PCS | Mod: CPTII,S$GLB,, | Performed by: NURSE PRACTITIONER

## 2022-07-28 PROCEDURE — 3078F DIAST BP <80 MM HG: CPT | Mod: CPTII,S$GLB,, | Performed by: NURSE PRACTITIONER

## 2022-07-28 PROCEDURE — 3078F PR MOST RECENT DIASTOLIC BLOOD PRESSURE < 80 MM HG: ICD-10-PCS | Mod: CPTII,S$GLB,, | Performed by: NURSE PRACTITIONER

## 2022-07-28 RX ORDER — CAPECITABINE 500 MG/1
825 TABLET, FILM COATED ORAL 2 TIMES DAILY
Qty: 150 TABLET | Refills: 0 | Status: SHIPPED | OUTPATIENT
Start: 2022-07-28 | End: 2022-09-02 | Stop reason: SDUPTHER

## 2022-07-28 NOTE — TELEPHONE ENCOUNTER
Call from Nurse Madison about an Urgent prescription for Xeloda needed for 8/1. Informed nurse [facility] cannot guarantee it would be able to get processed, but would work on prescription urgently once sent over and follow-up this afternoon.

## 2022-07-28 NOTE — TELEPHONE ENCOUNTER
Specialty Pharmacy - Initial Clinical Assessment    Specialty Medication Orders Linked to Encounter    Flowsheet Row Most Recent Value   Medication #1 capecitabine (XELODA) 500 MG Tab (Order#[id number], Rx#[id number])        Patient Diagnosis   C20 - Rectal cancer    Carolina Munguia is a 61 y.o. female, who is followed by the specialty pharmacy service for management and education.    Recent Encounters     Date Type Provider Description    07/28/2022 Specialty Pharmacy Sarika Sahni, [place] Initial Clinical Assessment    07/28/2022 Specialty Pharmacy Sarika Sahni, [place] Referral Authorization        Clinical call attempts since last clinical assessment   No call attempts found.     Current Outpatient Medications   Medication Sig Note    aspirin 81 MG Chew Take 81 mg by mouth.     capecitabine (XELODA) 500 MG Tab Take 3 tablets (1,500 mg total) by mouth 2 (two) times daily Take as directed Monday, Tuesday, Wednesday, Thursday, and Friday for the duration of radiation therapy. Take with food.     ciprofloxacin HCl (CIPRO) 250 MG tablet Take 250 mg by mouth 2 (two) times daily. 7/28/2022: Patient reports to not taking    ferrous sulfate 324 mg (65 mg iron) TbEC      fluconazole (DIFLUCAN) 200 MG Tab  7/28/2022: Patient reports to not taking    HYDROcodone-acetaminophen (NORCO) 5-325 mg per tablet Take 1 tablet by mouth every 8 (eight) hours as needed for Pain.     multivitamin capsule      nitroGLYCERIN (NITROSTAT) 0.4 MG SL tablet Place 0.4 mg under the tongue.    Last reviewed on 7/28/2022  3:23 PM by Sarika Sahni, PharmD    Review of patient's allergies indicates:   Allergen Reactions    Percocet [oxycodone-acetaminophen] Other (See Comments)     dizziness   Last reviewed on  7/28/2022 1:48 PM by Sarika Sahni    Drug Interactions    Drug interactions evaluated: yes  Clinically relevant drug interactions identified: no         Adverse Effects    Constipation: Pos        Assessment Questions - Documented Responses    Flowsheet Row Most Recent Value   Assessment    Medication Reconciliation completed for patient Yes   During the past 4 weeks, has patient missed any activities due to condition or medication? No   During the past 4 weeks, did patient have any of the following urgent care visits? None   Goals of Therapy Status Discussed (new start)   Status of the patients ability to self-administer: Is Able   All education points have been covered with patient? Yes, supplemental printed education provided   Welcome packet contents reviewed and discussed with patient? Yes   Assesment completed? Yes   Plan Therapy being initiated   Do you need to open a clinical intervention (i-vent)? No   Do you want to schedule first shipment? Yes   Medication #1 Assessment Info    Patient status New medication, New to [facility]   Is this medication appropriate for the patient? Yes   Is this medication effective? Not yet started        Refill Questions - Documented Responses    Flowsheet Row Most Recent Value   Patient Availability and HIPAA Verification    Does patient want to proceed with activity? Yes   HIPAA/medical authority confirmed? Yes   Relationship to patient of person spoken to? Self   Refill Screening Questions    When does the patient need to receive the medication? 08/01/22   Refill Delivery Questions    How will the patient receive the medication? Mail   When does the patient need to receive the medication? 08/01/22   Shipping Address Home   Address in [facility] confirmed and updated if neccessary? Yes   Expected Copay ($) 0   Is the patient able to afford the medication copay? Yes   Payment Method zero copay   Days supply of Refill 35   Supplies needed? No supplies needed   Refill activity completed? Yes   Refill activity plan Refill scheduled   Shipment/Pickup Date: 07/28/22          Objective    She has a past medical history of Encounter for blood transfusion and  "Hypertension.    Tried/failed medications: unknown    BP Readings from Last 4 Encounters:   07/28/22 108/68   07/21/22 100/66   07/14/22 131/72     Ht Readings from Last 4 Encounters:   07/28/22 5' 1" (1.549 m)   07/21/22 5' 1" (1.549 m)   07/14/22 5' 1" (1.549 m)   06/29/22 5' 1" (1.549 m)     Wt Readings from Last 4 Encounters:   07/28/22 67.1 kg (148 lb)   07/21/22 65.9 kg (145 lb 4.8 oz)   07/14/22 67.1 kg (148 lb)   06/29/22 66.1 kg (145 lb 11.2 oz)     Recent Labs   Lab Result Units 07/13/22  0000   Hemoglobin g/dL 8.1 L   Hematocrit % 26.8 L   WBC 10*3/uL 5.4   Sodium mmol/L 139   Potassium mmol/L 4.0   Chloride mmol/L 106   Glucose mg/dL 87   BUN mg/dL 8   Creatinine mg/dL 0.59   Calcium mg/dL 9.1   Total Protein g/dL 7.2   Albumin g/dL 3.3 L   Total Bilirubin mg/dL 0.2   Alkaline Phosphatase U/L 67   AST U/L 18   ALT U/L 23     The goals of cancer treatment include:  · Achieving remission of cancer, if possible  · Reducing tumor size and spread of cancer, if remission is not possible  · Minimizing pain and symptoms of the cancer  · Preventing infection and other complications of treatment  · Promoting adequate nutrition  · Encouraging proper hydration  · Improving or maintaining quality of life  · Maintaining optimal therapy adherence  · Minimizing and managing side effects    Goals of Therapy Status: Discussed (new start)    Assessment/Plan  Patient plans to start therapy on 08/01/22      Indication, dosage, appropriateness, effectiveness, safety and convenience of her specialty medication(s) were reviewed today.     Patient Education   Patient received education on the following:    Expectations and possible outcomes of therapy   Proper use, timely administration, and missed dose management   Duration of therapy   Side effects, including prevention, minimization, and management   Contraindications and safety precautions   New or changed medications, including prescribe and over the counter " medications and supplements   Reviews recommended vaccinations, as appropriate   Storage, safe handling, and disposal        Tasks added this encounter   8/29/2022 - Refill Call (Auto Added)  1/17/2023 - Clinical - Follow Up Assesement (180 day)   Tasks due within next 3 months   No tasks due.     Sarika Sahni, PharmD  [facility]  [address]  [facility] [address]  Phone: [phone]  Fax: [phone]

## 2022-07-28 NOTE — TELEPHONE ENCOUNTER
Completed 1/2 the initial. Including handleing, sig, DDI, current medications. Patient needs a call back to discuss side effects.

## 2022-07-28 NOTE — PROGRESS NOTES
MEDICAL ONCOLOGY FOLLOW UP CONSULTATION NOTE    Patient ID: Yulissa Munguia is a 61 y.o. female.    Chief Complaint: Rectal cancer    HPI:  Patient is a 61-year-old female who was previously diagnosed with rectal cancer about a year back and was seen and evaluated by [facility] with recommendations for chemo XRT for for presumed rectal cancer stage III A. Patient however was reluctant to get any treatment and did not pursue any medical care since then.  She now was referred by her primary care provider after patient was dismissed from [facility].  She presents to our clinic today with her daughters for further evaluation.  She still does not seem to be interested in pursuing chemo or radiation options but would like to have a discussion about her prognosis.  She also reports having a rectal tube placed recently in [place] but I have no records to suggest that it was done recently and for what indication.  She continues to smoke every day and does not seem to actually believe in her cancer diagnosis.      Social History     Socioeconomic History    Marital status:    Tobacco Use    Smoking status: Current Every Day Smoker     Years: 20.00     Types: Cigarettes    Smokeless tobacco: Never Used    Tobacco comment: would be interested in smoking cessation in the future   Substance and Sexual Activity    Alcohol use: Not Currently    Drug use: Not Currently     Past Surgical History:   Procedure Laterality Date    ADENOIDECTOMY       SECTION      TONSILLECTOMY           Review of systems:  Review of Systems   Constitutional: Positive for activity change and appetite change. Negative for chills, diaphoresis, fatigue and unexpected weight change.   HENT: Negative for congestion, facial swelling, hearing loss, mouth sores, trouble swallowing and voice change.    Eyes: Negative for photophobia, pain, discharge and itching.   Respiratory: Negative for apnea, cough,  choking, chest tightness and shortness of breath.    Cardiovascular: Negative for chest pain, palpitations and leg swelling.   Gastrointestinal: Positive for rectal pain. Negative for abdominal distention, abdominal pain, anal bleeding and blood in stool.   Endocrine: Negative for cold intolerance, heat intolerance, polydipsia and polyphagia.   Genitourinary: Negative for difficulty urinating, dysuria, flank pain and hematuria.   Musculoskeletal: Negative for arthralgias, back pain, joint swelling, myalgias, neck pain and neck stiffness.   Skin: Negative for color change, pallor and wound.   Allergic/Immunologic: Negative for environmental allergies, food allergies and immunocompromised state.   Neurological: Negative for dizziness, seizures, facial asymmetry, speech difficulty, light-headedness, numbness and headaches.   Hematological: Negative for adenopathy. Does not bruise/bleed easily.   Psychiatric/Behavioral: Negative for agitation, behavioral problems, confusion, decreased concentration and sleep disturbance.             Physical Exam  Vitals and nursing note reviewed.   Constitutional:       General: She is not in acute distress.     Appearance: Normal appearance. She is not ill-appearing.   HENT:      Head: Normocephalic and atraumatic.      Nose: No congestion or rhinorrhea.   Eyes:      General: No scleral icterus.     Extraocular Movements: Extraocular movements intact.      Pupils: Pupils are equal, round, and reactive to light.   Cardiovascular:      Rate and Rhythm: Normal rate and regular rhythm.      Pulses: Normal pulses.      Heart sounds: Normal heart sounds. No murmur heard.    No gallop.   Pulmonary:      Effort: Pulmonary effort is normal. No respiratory distress.      Breath sounds: Normal breath sounds. No stridor. No wheezing or rhonchi.   Abdominal:      General: Bowel sounds are normal. There is no distension.      Palpations: There is no mass.      Tenderness: There is no abdominal  tenderness. There is no guarding.   Musculoskeletal:         General: No swelling, tenderness, deformity or signs of injury. Normal range of motion.      Cervical back: Normal range of motion and neck supple. No rigidity. No muscular tenderness.      Right lower leg: No edema.      Left lower leg: No edema.   Skin:     General: Skin is warm.      Coloration: Skin is not jaundiced or pale.      Findings: No bruising or lesion.   Neurological:      General: No focal deficit present.      Mental Status: She is alert and oriented to person, place, and time.      Cranial Nerves: No cranial nerve deficit.      Sensory: No sensory deficit.      Motor: No weakness.      Gait: Gait normal.   Psychiatric:         Mood and Affect: Mood normal.         Behavior: Behavior normal.         Thought Content: Thought content normal.       Vitals:    07/28/22 0959   BP: 108/68   Pulse: 82   Resp: 16   Body surface area is 1.7 meters squared.    Assessment/Plan:      ECOG 1     1. Rectal Cancer:    == Initially diagnosed in July 2021 as stage IIIA rectal cancer.  She has not followed and not pursued any treatment since then as she is skeptical about chemotherapy and side effects of radiation.  She believes an alternate treatment options which she has likely been doing since that time.  After recent dismissal from [facility] she presents to us for further discussion of treatment and management options.  == I discussed with her that restaging scan would be needed to accurately determine her current cancer stage and based on that would recommend treatment with either palliative or curative intent.  She has neglected rectal cancer over the last 1 year at this time and I am strongly suspecting metastasis is with stage IV disease.  I discussed with her that this is an Oncology Clinic and we would only talk about standard treatment with chemotherapy plus-minus radiation options.  Patient is more interested interested in  alternative treatment discussions and I discussed with her that this is not something we do in this clinic.  She voiced understanding but would like to get her PET scan done and then return to clinic for further discussion.  She is not currently agreeable to chemotherapy but would like to discuss this further once PET scan results are back.  == 7/14/22:  Patient underwent PET scan 07/08/2022 which showed continued thickening of the rectal wall extending into the anal region with increased radiotracer uptake.  This continues to extend over about 6 cm in length.  Increased radiotracer uptake remains present in the area with an SUV of approximately 22.5 compared 24.4 on the previous examination.  Indistinctness of the fat planes posteriorly remain present consistent with extra colonic extension.  There continues to be hypermetabolic lymph nodes in the inguinal region bilaterally short axis diameter is on the order of about 12 mm are present with SUVs on the order of approximately 3.4.  A mildly hypermetabolic lymph node remains present along the left sidewall of the pelvis having short axis diameter of approximately 7 mm today versus 10 mm on the previous examination.  The SUV in this area is approximately 2.1 versus 5.8 on previous exam. Considering her original diagnosis as Stage IIIA with outside oncologist, this PET scan shows it is likely, T4a with extracolonic extension and N1b with 2-3 regional LN involvement. I discussed with her about chemo-XRT  and would like to talk to radiation oncology first before she decides to pursue any treatment. I will hence make referral to Rad-onc in this regard  ==07/21/2022: Patient reports that she would like to pursue chemoradiaton. She has appointment today for radiation evaluation with Dr. Contreras. I discussed with Dr. Herndon and if she is candidate for radiation plan is for concurrent XRT with 5-fu. She will need mediport placement. Referral placed.   ==07/28/2022:  Pt here  today to discuss upcoming chemotherapy, side effect profile, risk versus benefits, and expected outcomes have been discussed today. All questions and concerns answered and consents have been signed.After discussion with XRT and Dr. Herndon plan is to proceed with concurrent XRT and Xeloda. Xeloda dosage calculated with only 500mg tablets due to patient getting very confused with medications and having difficulty repeating proper dosage back after multiple attempts at teaching. She is poor candidateo receive combination of 500mg and 150mg and will likely confuse them and take improper dose. She also reports taking too many to name or remember herbs and supplements which are not on medication list. I instructed her to contact clinic with list to ensure no interactions.       2. Pain Management:    == I discussed with her that we have a very strict policy for pain medications and she will need to sign a pain contract. If she decides to pursue treatment for her rectal cancer then she can be started on pain medications. XRT could also help in this regard  == Norco 10/325 mg PO BID prn is what she seems to be taking at this time, after her last prescription from her ER visit.      Plan:  Xeloda escribed to [facility]  Patient has been simulated with XRT and will start as soon as able to get Xeloda - possibly 8/1/22  RTC 1week cbc cmp  Start Xeloda same day as XRT        Total time spent in counseling and discussion about further management options including relevant lab work, treatment,  prognosis, medications and intended side effects was more than 60 minutes. More than 50% of the time was spent on counseling and coordination of care.  This includes face to face time and non-face to face time preparing to see the patient (eg, review of tests), Obtaining and/or reviewing separately obtained history, Documenting clinical information in the electronic or other health record, Independently interpreting  resultsand communicating results to the patient/family/caregiver, or Care coordination.

## 2022-08-01 ENCOUNTER — SPECIALTY PHARMACY (OUTPATIENT)
Dept: PHARMACY | Facility: CLINIC | Age: 62
End: 2022-08-01
Payer: MEDICAID

## 2022-08-01 ENCOUNTER — TELEPHONE (OUTPATIENT)
Dept: HEMATOLOGY/ONCOLOGY | Facility: CLINIC | Age: 62
End: 2022-08-01
Payer: MEDICAID

## 2022-08-01 RX ORDER — ONDANSETRON HYDROCHLORIDE 8 MG/1
8 TABLET, FILM COATED ORAL EVERY 8 HOURS PRN
Qty: 30 TABLET | Refills: 2 | Status: SHIPPED | OUTPATIENT
Start: 2022-08-01 | End: 2023-08-01

## 2022-08-01 NOTE — TELEPHONE ENCOUNTER
Patient had questions about how to take Xeloda. Reiterated how usually taken, within 30 minutes of food with water the same times each day trying to take ~12hours apart. Informed patient to take in regards to radiation following the directions given by Radiation office gave her

## 2022-08-01 NOTE — TELEPHONE ENCOUNTER
Skyla and myself spoke with the patient regarding medication administration. All questions answered regarding Xeloda as well as her zofran. Patient states understanding. TTRN

## 2022-08-01 NOTE — TELEPHONE ENCOUNTER
Pt's daughter stated she was unaware pt was suppose to take chemo pills 30 minutes before radiation and their appt was at 8 am this morning. It is now 8:27 and they are still at home. I voiced for her to give pt her chemo now and go straight to radiation and also to call them to see if Dr. Contreras will still see her. Also voiced Skyla Rock called out the zofran and they can call the pharmacy with to get an update. She voiced understanding.

## 2022-08-01 NOTE — TELEPHONE ENCOUNTER
----- Message from Suellen Lin sent at 8/1/2022  8:11 AM CDT -----  Contact: daughter Deborah  Patient's daughter need to speak with nurse regarding calling out medication for nausea. Call back number [phone].Tks

## 2022-08-03 DIAGNOSIS — C20 RECTAL CANCER: Primary | ICD-10-CM

## 2022-08-04 DIAGNOSIS — K62.89 RECTAL PAIN: ICD-10-CM

## 2022-08-04 DIAGNOSIS — C20 RECTAL CANCER: ICD-10-CM

## 2022-08-04 RX ORDER — HYDROCODONE BITARTRATE AND ACETAMINOPHEN 5; 325 MG/1; MG/1
1 TABLET ORAL EVERY 8 HOURS PRN
Qty: 30 TABLET | Refills: 0 | Status: CANCELLED | OUTPATIENT
Start: 2022-08-04 | End: 2022-08-14

## 2022-08-04 RX ORDER — HYDROCODONE BITARTRATE AND ACETAMINOPHEN 5; 325 MG/1; MG/1
1 TABLET ORAL EVERY 8 HOURS PRN
Qty: 15 TABLET | Refills: 0 | Status: SHIPPED | OUTPATIENT
Start: 2022-08-04 | End: 2022-08-09 | Stop reason: SDUPTHER

## 2022-08-04 NOTE — PROGRESS NOTES
Patient requesting pain medication refill. Hydrocodone 5/325 last filled on 7/25/2022 with disp 30.  Patient was scheduled with me for appt next week, however, she told medical assistant that she no longer wants me to be a part of her care and that it is not right with [facility] that I made her sign a pain contract. Her appointment was moved to another provider and I will refill 1 week supply only of pain medication until that appt

## 2022-08-09 ENCOUNTER — OFFICE VISIT (OUTPATIENT)
Dept: HEMATOLOGY/ONCOLOGY | Facility: CLINIC | Age: 62
End: 2022-08-09
Payer: MEDICAID

## 2022-08-09 ENCOUNTER — TELEPHONE (OUTPATIENT)
Dept: HEMATOLOGY/ONCOLOGY | Facility: CLINIC | Age: 62
End: 2022-08-09

## 2022-08-09 VITALS
BODY MASS INDEX: 27.94 KG/M2 | SYSTOLIC BLOOD PRESSURE: 104 MMHG | HEART RATE: 74 BPM | OXYGEN SATURATION: 98 % | RESPIRATION RATE: 18 BRPM | HEIGHT: 61 IN | DIASTOLIC BLOOD PRESSURE: 70 MMHG | WEIGHT: 148 LBS

## 2022-08-09 DIAGNOSIS — G89.3 CANCER RELATED PAIN: ICD-10-CM

## 2022-08-09 DIAGNOSIS — C20 RECTAL CANCER: Primary | ICD-10-CM

## 2022-08-09 DIAGNOSIS — K62.89 RECTAL PAIN: ICD-10-CM

## 2022-08-09 PROCEDURE — 1159F MED LIST DOCD IN RCRD: CPT | Mod: CPTII,S$GLB,, | Performed by: NURSE PRACTITIONER

## 2022-08-09 PROCEDURE — 1160F RVW MEDS BY RX/DR IN RCRD: CPT | Mod: CPTII,S$GLB,, | Performed by: NURSE PRACTITIONER

## 2022-08-09 PROCEDURE — 99214 OFFICE O/P EST MOD 30 MIN: CPT | Mod: S$GLB,,, | Performed by: NURSE PRACTITIONER

## 2022-08-09 PROCEDURE — 3008F BODY MASS INDEX DOCD: CPT | Mod: CPTII,S$GLB,, | Performed by: NURSE PRACTITIONER

## 2022-08-09 PROCEDURE — 3074F PR MOST RECENT SYSTOLIC BLOOD PRESSURE < 130 MM HG: ICD-10-PCS | Mod: CPTII,S$GLB,, | Performed by: NURSE PRACTITIONER

## 2022-08-09 PROCEDURE — 3078F PR MOST RECENT DIASTOLIC BLOOD PRESSURE < 80 MM HG: ICD-10-PCS | Mod: CPTII,S$GLB,, | Performed by: NURSE PRACTITIONER

## 2022-08-09 PROCEDURE — 1159F PR MEDICATION LIST DOCUMENTED IN MEDICAL RECORD: ICD-10-PCS | Mod: CPTII,S$GLB,, | Performed by: NURSE PRACTITIONER

## 2022-08-09 PROCEDURE — 1160F PR REVIEW ALL MEDS BY PRESCRIBER/CLIN PHARMACIST DOCUMENTED: ICD-10-PCS | Mod: CPTII,S$GLB,, | Performed by: NURSE PRACTITIONER

## 2022-08-09 PROCEDURE — 3008F PR BODY MASS INDEX (BMI) DOCUMENTED: ICD-10-PCS | Mod: CPTII,S$GLB,, | Performed by: NURSE PRACTITIONER

## 2022-08-09 PROCEDURE — 3078F DIAST BP <80 MM HG: CPT | Mod: CPTII,S$GLB,, | Performed by: NURSE PRACTITIONER

## 2022-08-09 PROCEDURE — 99214 PR OFFICE/OUTPT VISIT, EST, LEVL IV, 30-39 MIN: ICD-10-PCS | Mod: S$GLB,,, | Performed by: NURSE PRACTITIONER

## 2022-08-09 PROCEDURE — 3074F SYST BP LT 130 MM HG: CPT | Mod: CPTII,S$GLB,, | Performed by: NURSE PRACTITIONER

## 2022-08-09 RX ORDER — HYDROCODONE BITARTRATE AND ACETAMINOPHEN 5; 325 MG/1; MG/1
1 TABLET ORAL EVERY 12 HOURS PRN
Qty: 30 TABLET | Refills: 0 | Status: SHIPPED | OUTPATIENT
Start: 2022-08-09 | End: 2022-08-23 | Stop reason: SDUPTHER

## 2022-08-09 NOTE — PROGRESS NOTES
MEDICAL ONCOLOGY FOLLOW UP CONSULTATION NOTE    Patient ID: Yulissa Munguia is a 61 y.o. female.    Chief Complaint: Rectal cancer    HPI:  Patient is a 61-year-old female who was previously diagnosed with rectal cancer about a year back and was seen and evaluated by [facility] with recommendations for chemo XRT for for presumed rectal cancer stage III A. Patient however was reluctant to get any treatment and did not pursue any medical care since then.  She now was referred by her primary care provider after patient was dismissed from [facility].  She presents to our clinic today with her daughters for further evaluation.  She still does not seem to be interested in pursuing chemo or radiation options but would like to have a discussion about her prognosis.  She also reports having a rectal tube placed recently in [place] but I have no records to suggest that it was done recently and for what indication.  She continues to smoke every day and does not seem to actually believe in her cancer diagnosis.      Social History     Socioeconomic History    Marital status:    Tobacco Use    Smoking status: Current Every Day Smoker     Years: 20.00     Types: Cigarettes    Smokeless tobacco: Never Used    Tobacco comment: would be interested in smoking cessation in the future   Substance and Sexual Activity    Alcohol use: Not Currently    Drug use: Not Currently     Past Surgical History:   Procedure Laterality Date    ADENOIDECTOMY       SECTION      TONSILLECTOMY           Review of systems:  Review of Systems   Constitutional: Positive for activity change and appetite change. Negative for chills, diaphoresis, fatigue and unexpected weight change.   HENT: Negative for congestion, facial swelling, hearing loss, mouth sores, trouble swallowing and voice change.    Eyes: Negative for photophobia, pain, discharge and itching.   Respiratory: Negative for apnea, cough,  choking, chest tightness and shortness of breath.    Cardiovascular: Negative for chest pain, palpitations and leg swelling.   Gastrointestinal: Positive for rectal pain. Negative for abdominal distention, abdominal pain, anal bleeding and blood in stool.   Endocrine: Negative for cold intolerance, heat intolerance, polydipsia and polyphagia.   Genitourinary: Negative for difficulty urinating, dysuria, flank pain and hematuria.   Musculoskeletal: Negative for arthralgias, back pain, joint swelling, myalgias, neck pain and neck stiffness.   Skin: Negative for color change, pallor and wound.   Allergic/Immunologic: Negative for environmental allergies, food allergies and immunocompromised state.   Neurological: Negative for dizziness, seizures, facial asymmetry, speech difficulty, light-headedness, numbness and headaches.   Hematological: Negative for adenopathy. Does not bruise/bleed easily.   Psychiatric/Behavioral: Negative for agitation, behavioral problems, confusion, decreased concentration and sleep disturbance.             Physical Exam  Vitals and nursing note reviewed.   Constitutional:       General: She is not in acute distress.     Appearance: Normal appearance. She is not ill-appearing.   HENT:      Head: Normocephalic and atraumatic.      Nose: No congestion or rhinorrhea.   Eyes:      General: No scleral icterus.     Extraocular Movements: Extraocular movements intact.      Pupils: Pupils are equal, round, and reactive to light.   Cardiovascular:      Rate and Rhythm: Normal rate and regular rhythm.      Pulses: Normal pulses.      Heart sounds: Normal heart sounds. No murmur heard.    No gallop.   Pulmonary:      Effort: Pulmonary effort is normal. No respiratory distress.      Breath sounds: Normal breath sounds. No stridor. No wheezing or rhonchi.   Abdominal:      General: Bowel sounds are normal. There is no distension.      Palpations: There is no mass.      Tenderness: There is no abdominal  tenderness. There is no guarding.   Musculoskeletal:         General: No swelling, tenderness, deformity or signs of injury. Normal range of motion.      Cervical back: Normal range of motion and neck supple. No rigidity. No muscular tenderness.      Right lower leg: No edema.      Left lower leg: No edema.   Skin:     General: Skin is warm.      Coloration: Skin is not jaundiced or pale.      Findings: No bruising or lesion.   Neurological:      General: No focal deficit present.      Mental Status: She is alert and oriented to person, place, and time.      Cranial Nerves: No cranial nerve deficit.      Sensory: No sensory deficit.      Motor: No weakness.      Gait: Gait normal.   Psychiatric:         Mood and Affect: Mood normal.         Behavior: Behavior normal.         Thought Content: Thought content normal.       Vitals:    08/09/22 0914   BP: 104/70   Pulse: 74   Resp: 18   Body surface area is 1.7 meters squared.    Assessment/Plan:      ECOG 1     1. Rectal Cancer:    == Initially diagnosed in July 2021 as stage IIIA rectal cancer.  She has not followed and not pursued any treatment since then as she is skeptical about chemotherapy and side effects of radiation.  She believes an alternate treatment options which she has likely been doing since that time.  After recent dismissal from [facility] she presents to us for further discussion of treatment and management options.  == I discussed with her that restaging scan would be needed to accurately determine her current cancer stage and based on that would recommend treatment with either palliative or curative intent.  She has neglected rectal cancer over the last 1 year at this time and I am strongly suspecting metastasis is with stage IV disease.  I discussed with her that this is an Oncology Clinic and we would only talk about standard treatment with chemotherapy plus-minus radiation options.  Patient is more interested interested in  alternative treatment discussions and I discussed with her that this is not something we do in this clinic.  She voiced understanding but would like to get her PET scan done and then return to clinic for further discussion.  She is not currently agreeable to chemotherapy but would like to discuss this further once PET scan results are back.  == 7/14/22:  Patient underwent PET scan 07/08/2022 which showed continued thickening of the rectal wall extending into the anal region with increased radiotracer uptake.  This continues to extend over about 6 cm in length.  Increased radiotracer uptake remains present in the area with an SUV of approximately 22.5 compared 24.4 on the previous examination.  Indistinctness of the fat planes posteriorly remain present consistent with extra colonic extension.  There continues to be hypermetabolic lymph nodes in the inguinal region bilaterally short axis diameter is on the order of about 12 mm are present with SUVs on the order of approximately 3.4.  A mildly hypermetabolic lymph node remains present along the left sidewall of the pelvis having short axis diameter of approximately 7 mm today versus 10 mm on the previous examination.  The SUV in this area is approximately 2.1 versus 5.8 on previous exam. Considering her original diagnosis as Stage IIIA with outside oncologist, this PET scan shows it is likely, T4a with extracolonic extension and N1b with 2-3 regional LN involvement. I discussed with her about chemo-XRT  and would like to talk to radiation oncology first before she decides to pursue any treatment. I will hence make referral to Rad-onc in this regard  ==07/21/2022: Patient reports that she would like to pursue chemoradiaton. She has appointment today for radiation evaluation with Dr. Contreras. I discussed with Dr. Herndon and if she is candidate for radiation plan is for concurrent XRT with 5-fu. She will need mediport placement. Referral placed.   ==07/28/2022:  Pt here  today to discuss upcoming chemotherapy, side effect profile, risk versus benefits, and expected outcomes have been discussed today. All questions and concerns answered and consents have been signed.After discussion with XRT and Dr. Herndon plan is to proceed with concurrent XRT and Xeloda. Xeloda dosage calculated with only 500mg tablets due to patient getting very confused with medications and having difficulty repeating proper dosage back after multiple attempts at teaching. She is poor candidate receive combination of 500mg and 150mg and will likely confuse them and take improper dose. She also reports taking too many to name or remember herbs and supplements which are not on medication list. I instructed her to contact clinic with list to ensure no interactions.   == 8/9/22: tolerating chemo/xrt well, states she is feeling much better since starting treatment. She does have some rectal pressure and pain, taking norco 1-2 times day     2. Pain Management:    == I discussed with her that we have a very strict policy for pain medications and she will need to sign a pain contract. If she decides to pursue treatment for her rectal cancer then she can be started on pain medications. XRT could also help in this regard  == Norco 10/325 mg PO BID prn is what she seems to be taking at this time, after her last prescription from her ER visit.    3. Constipation  Advised to take miralax and senokot   Limit opioid usage    Plan:  Continue Xeloda /xrt  Weekly cbc cmp at XRT  RTC  2 weeks           Total time spent in counseling and discussion about further management options including relevant lab work, treatment,  prognosis, medications and intended side effects was more than 30 minutes. More than 50% of the time was spent on counseling and coordination of care.  This includes face to face time and non-face to face time preparing to see the patient (eg, review of tests), Obtaining and/or reviewing separately obtained history,  Documenting clinical information in the electronic or other health record, Independently interpreting resultsand communicating results to the patient/family/caregiver, or Care coordination.

## 2022-08-23 ENCOUNTER — OFFICE VISIT (OUTPATIENT)
Dept: HEMATOLOGY/ONCOLOGY | Facility: CLINIC | Age: 62
End: 2022-08-23
Payer: MEDICAID

## 2022-08-23 VITALS
HEIGHT: 61 IN | BODY MASS INDEX: 27.75 KG/M2 | HEART RATE: 74 BPM | OXYGEN SATURATION: 98 % | SYSTOLIC BLOOD PRESSURE: 112 MMHG | WEIGHT: 147 LBS | DIASTOLIC BLOOD PRESSURE: 75 MMHG | RESPIRATION RATE: 16 BRPM

## 2022-08-23 DIAGNOSIS — C20 RECTAL CANCER: Primary | ICD-10-CM

## 2022-08-23 DIAGNOSIS — K62.89 RECTAL PAIN: ICD-10-CM

## 2022-08-23 DIAGNOSIS — G89.3 CANCER RELATED PAIN: ICD-10-CM

## 2022-08-23 PROCEDURE — 1159F MED LIST DOCD IN RCRD: CPT | Mod: CPTII,S$GLB,, | Performed by: NURSE PRACTITIONER

## 2022-08-23 PROCEDURE — 3008F BODY MASS INDEX DOCD: CPT | Mod: CPTII,S$GLB,, | Performed by: NURSE PRACTITIONER

## 2022-08-23 PROCEDURE — 3078F DIAST BP <80 MM HG: CPT | Mod: CPTII,S$GLB,, | Performed by: NURSE PRACTITIONER

## 2022-08-23 PROCEDURE — 1159F PR MEDICATION LIST DOCUMENTED IN MEDICAL RECORD: ICD-10-PCS | Mod: CPTII,S$GLB,, | Performed by: NURSE PRACTITIONER

## 2022-08-23 PROCEDURE — 3008F PR BODY MASS INDEX (BMI) DOCUMENTED: ICD-10-PCS | Mod: CPTII,S$GLB,, | Performed by: NURSE PRACTITIONER

## 2022-08-23 PROCEDURE — 99214 PR OFFICE/OUTPT VISIT, EST, LEVL IV, 30-39 MIN: ICD-10-PCS | Mod: S$GLB,,, | Performed by: NURSE PRACTITIONER

## 2022-08-23 PROCEDURE — 3078F PR MOST RECENT DIASTOLIC BLOOD PRESSURE < 80 MM HG: ICD-10-PCS | Mod: CPTII,S$GLB,, | Performed by: NURSE PRACTITIONER

## 2022-08-23 PROCEDURE — 99214 OFFICE O/P EST MOD 30 MIN: CPT | Mod: S$GLB,,, | Performed by: NURSE PRACTITIONER

## 2022-08-23 PROCEDURE — 3074F PR MOST RECENT SYSTOLIC BLOOD PRESSURE < 130 MM HG: ICD-10-PCS | Mod: CPTII,S$GLB,, | Performed by: NURSE PRACTITIONER

## 2022-08-23 PROCEDURE — 3074F SYST BP LT 130 MM HG: CPT | Mod: CPTII,S$GLB,, | Performed by: NURSE PRACTITIONER

## 2022-08-23 RX ORDER — HYDROCODONE BITARTRATE AND ACETAMINOPHEN 5; 325 MG/1; MG/1
1 TABLET ORAL EVERY 12 HOURS PRN
Qty: 30 TABLET | Refills: 0 | Status: SHIPPED | OUTPATIENT
Start: 2022-08-23 | End: 2022-08-31 | Stop reason: SDUPTHER

## 2022-08-23 NOTE — PROGRESS NOTES
MEDICAL ONCOLOGY FOLLOW UP CONSULTATION NOTE    Patient ID: Yulissa Munguia is a 61 y.o. female.    Chief Complaint: Rectal cancer    HPI:  Patient is a 61-year-old female who was previously diagnosed with rectal cancer about a year back and was seen and evaluated by [facility] with recommendations for chemo XRT for for presumed rectal cancer stage III A. Patient however was reluctant to get any treatment and did not pursue any medical care since then.  She now was referred by her primary care provider after patient was dismissed from [facility].  She presents to our clinic today with her daughters for further evaluation.  She still does not seem to be interested in pursuing chemo or radiation options but would like to have a discussion about her prognosis.  She also reports having a rectal tube placed recently in [place] but I have no records to suggest that it was done recently and for what indication.  She continues to smoke every day and does not seem to actually believe in her cancer diagnosis.      Social History     Socioeconomic History    Marital status:    Tobacco Use    Smoking status: Current Every Day Smoker     Years: 20.00     Types: Cigarettes    Smokeless tobacco: Never Used    Tobacco comment: would be interested in smoking cessation in the future   Substance and Sexual Activity    Alcohol use: Not Currently    Drug use: Not Currently     Past Surgical History:   Procedure Laterality Date    ADENOIDECTOMY       SECTION      TONSILLECTOMY           Review of systems:  Review of Systems   Constitutional: Positive for activity change and appetite change. Negative for chills, diaphoresis, fatigue and unexpected weight change.   HENT: Negative for congestion, facial swelling, hearing loss, mouth sores, trouble swallowing and voice change.    Eyes: Negative for photophobia, pain, discharge and itching.   Respiratory: Negative for apnea, cough,  choking, chest tightness and shortness of breath.    Cardiovascular: Negative for chest pain, palpitations and leg swelling.   Gastrointestinal: Positive for rectal pain. Negative for abdominal distention, abdominal pain, anal bleeding and blood in stool.   Endocrine: Negative for cold intolerance, heat intolerance, polydipsia and polyphagia.   Genitourinary: Negative for difficulty urinating, dysuria, flank pain and hematuria.   Musculoskeletal: Negative for arthralgias, back pain, joint swelling, myalgias, neck pain and neck stiffness.   Skin: Negative for color change, pallor and wound.   Allergic/Immunologic: Negative for environmental allergies, food allergies and immunocompromised state.   Neurological: Negative for dizziness, seizures, facial asymmetry, speech difficulty, light-headedness, numbness and headaches.   Hematological: Negative for adenopathy. Does not bruise/bleed easily.   Psychiatric/Behavioral: Negative for agitation, behavioral problems, confusion, decreased concentration and sleep disturbance.             Physical Exam  Vitals and nursing note reviewed.   Constitutional:       General: She is not in acute distress.     Appearance: Normal appearance. She is not ill-appearing.   HENT:      Head: Normocephalic and atraumatic.      Nose: No congestion or rhinorrhea.   Eyes:      General: No scleral icterus.     Extraocular Movements: Extraocular movements intact.      Pupils: Pupils are equal, round, and reactive to light.   Cardiovascular:      Rate and Rhythm: Normal rate and regular rhythm.      Pulses: Normal pulses.      Heart sounds: Normal heart sounds. No murmur heard.    No gallop.   Pulmonary:      Effort: Pulmonary effort is normal. No respiratory distress.      Breath sounds: Normal breath sounds. No stridor. No wheezing or rhonchi.   Abdominal:      General: Bowel sounds are normal. There is no distension.      Palpations: There is no mass.      Tenderness: There is no abdominal  tenderness. There is no guarding.   Musculoskeletal:         General: No swelling, tenderness, deformity or signs of injury. Normal range of motion.      Cervical back: Normal range of motion and neck supple. No rigidity. No muscular tenderness.      Right lower leg: No edema.      Left lower leg: No edema.   Skin:     General: Skin is warm.      Coloration: Skin is not jaundiced or pale.      Findings: No bruising or lesion.   Neurological:      General: No focal deficit present.      Mental Status: She is alert and oriented to person, place, and time.      Cranial Nerves: No cranial nerve deficit.      Sensory: No sensory deficit.      Motor: No weakness.      Gait: Gait normal.   Psychiatric:         Mood and Affect: Mood normal.         Behavior: Behavior normal.         Thought Content: Thought content normal.       Vitals:    08/23/22 1330   BP: 112/75   Pulse: 74   Resp: 16   Body surface area is 1.69 meters squared.    Assessment/Plan:      ECOG 1     1. Rectal Cancer:    == Initially diagnosed in July 2021 as stage IIIA rectal cancer.  She has not followed and not pursued any treatment since then as she is skeptical about chemotherapy and side effects of radiation.  She believes an alternate treatment options which she has likely been doing since that time.  After recent dismissal from [facility] she presents to us for further discussion of treatment and management options.  == I discussed with her that restaging scan would be needed to accurately determine her current cancer stage and based on that would recommend treatment with either palliative or curative intent.  She has neglected rectal cancer over the last 1 year at this time and I am strongly suspecting metastasis is with stage IV disease.  I discussed with her that this is an Oncology Clinic and we would only talk about standard treatment with chemotherapy plus-minus radiation options.  Patient is more interested interested in  alternative treatment discussions and I discussed with her that this is not something we do in this clinic.  She voiced understanding but would like to get her PET scan done and then return to clinic for further discussion.  She is not currently agreeable to chemotherapy but would like to discuss this further once PET scan results are back.  == 7/14/22:  Patient underwent PET scan 07/08/2022 which showed continued thickening of the rectal wall extending into the anal region with increased radiotracer uptake.  This continues to extend over about 6 cm in length.  Increased radiotracer uptake remains present in the area with an SUV of approximately 22.5 compared 24.4 on the previous examination.  Indistinctness of the fat planes posteriorly remain present consistent with extra colonic extension.  There continues to be hypermetabolic lymph nodes in the inguinal region bilaterally short axis diameter is on the order of about 12 mm are present with SUVs on the order of approximately 3.4.  A mildly hypermetabolic lymph node remains present along the left sidewall of the pelvis having short axis diameter of approximately 7 mm today versus 10 mm on the previous examination.  The SUV in this area is approximately 2.1 versus 5.8 on previous exam. Considering her original diagnosis as Stage IIIA with outside oncologist, this PET scan shows it is likely, T4a with extracolonic extension and N1b with 2-3 regional LN involvement. I discussed with her about chemo-XRT  and would like to talk to radiation oncology first before she decides to pursue any treatment. I will hence make referral to Rad-onc in this regard  ==07/21/2022: Patient reports that she would like to pursue chemoradiaton. She has appointment today for radiation evaluation with Dr. Contreras. I discussed with Dr. Herndon and if she is candidate for radiation plan is for concurrent XRT with 5-fu. She will need mediport placement. Referral placed.   ==07/28/2022:  Pt here  today to discuss upcoming chemotherapy, side effect profile, risk versus benefits, and expected outcomes have been discussed today. All questions and concerns answered and consents have been signed.After discussion with MYRIAMT and Dr. Herndon plan is to proceed with concurrent XRT and Xeloda. Xeloda dosage calculated with only 500mg tablets due to patient getting very confused with medications and having difficulty repeating proper dosage back after multiple attempts at teaching. She is poor candidate receive combination of 500mg and 150mg and will likely confuse them and take improper dose. She also reports taking too many to name or remember herbs and supplements which are not on medication list. I instructed her to contact clinic with list to ensure no interactions.   == 8/9/22: tolerating chemo/xrt well, states she is feeling much better since starting treatment. She does have some rectal pressure and pain, taking norco 1-2 times day   == 8/23/22: compliant with chemo/xrt, continues with rectal pain, refill of norco provided today. Labs reviewed from last week, show mild anemia.    2. Pain Management:    == I discussed with her that we have a very strict policy for pain medications and she will need to sign a pain contract. If she decides to pursue treatment for her rectal cancer then she can be started on pain medications. XRT could also help in this regard  == Norco 10/325 mg PO BID prn is what she seems to be taking at this time, after her last prescription from her ER visit.    3. Constipation  Advised to take miralax and senokot   Limit opioid usage  Improving with treatment    Plan:  Continue Xeloda /xrt  Weekly cbc cmp at XRT  RTC  2 weeks           Total time spent in counseling and discussion about further management options including relevant lab work, treatment,  prognosis, medications and intended side effects was more than 30 minutes. More than 50% of the time was spent on counseling and coordination of  care.  This includes face to face time and non-face to face time preparing to see the patient (eg, review of tests), Obtaining and/or reviewing separately obtained history, Documenting clinical information in the electronic or other health record, Independently interpreting resultsand communicating results to the patient/family/caregiver, or Care coordination.

## 2022-08-29 ENCOUNTER — SPECIALTY PHARMACY (OUTPATIENT)
Dept: PHARMACY | Facility: CLINIC | Age: 62
End: 2022-08-29
Payer: MEDICAID

## 2022-08-29 DIAGNOSIS — C20 RECTAL CANCER: ICD-10-CM

## 2022-08-29 RX ORDER — CAPECITABINE 500 MG/1
825 TABLET, FILM COATED ORAL 2 TIMES DAILY
Qty: 150 TABLET | Refills: 0 | Status: CANCELLED | OUTPATIENT
Start: 2022-08-29

## 2022-08-29 RX ORDER — CAPECITABINE 500 MG/1
825 TABLET, FILM COATED ORAL 2 TIMES DAILY
Qty: 150 TABLET | Refills: 0 | OUTPATIENT
Start: 2022-08-29

## 2022-08-29 NOTE — TELEPHONE ENCOUNTER
Per Patti Webb NP, pt will continue with XRT until 9/13. Outgoing call to pt to see how many she had on hand. Pt was unsure. Sent refill request to [facility].

## 2022-08-29 NOTE — TELEPHONE ENCOUNTER
Seems as though pt has completed the current regimen of XRT and Xeloda. Message sent to [facility] to confirm she is finished or see if more is needed. Will continue to follow.

## 2022-08-31 DIAGNOSIS — G89.3 CANCER RELATED PAIN: ICD-10-CM

## 2022-08-31 DIAGNOSIS — K62.89 RECTAL PAIN: ICD-10-CM

## 2022-08-31 DIAGNOSIS — C20 RECTAL CANCER: ICD-10-CM

## 2022-08-31 RX ORDER — HYDROCODONE BITARTRATE AND ACETAMINOPHEN 5; 325 MG/1; MG/1
1 TABLET ORAL EVERY 12 HOURS PRN
Qty: 30 TABLET | Refills: 0 | Status: SHIPPED | OUTPATIENT
Start: 2022-08-31 | End: 2022-09-06

## 2022-09-02 ENCOUNTER — SPECIALTY PHARMACY (OUTPATIENT)
Dept: PHARMACY | Facility: CLINIC | Age: 62
End: 2022-09-02
Payer: MEDICAID

## 2022-09-02 DIAGNOSIS — C20 RECTAL CANCER: ICD-10-CM

## 2022-09-02 RX ORDER — CAPECITABINE 500 MG/1
825 TABLET, FILM COATED ORAL 2 TIMES DAILY
Qty: 150 TABLET | Refills: 0 | Status: CANCELLED | OUTPATIENT
Start: 2022-09-02

## 2022-09-02 RX ORDER — CAPECITABINE 500 MG/1
825 TABLET, FILM COATED ORAL 2 TIMES DAILY
Qty: 36 TABLET | Refills: 0 | OUTPATIENT
Start: 2022-09-02

## 2022-09-02 RX ORDER — CAPECITABINE 500 MG/1
825 TABLET, FILM COATED ORAL 2 TIMES DAILY
Qty: 36 TABLET | Refills: 0 | Status: SHIPPED | OUTPATIENT
Start: 2022-09-02 | End: 2022-09-02 | Stop reason: SDUPTHER

## 2022-09-02 RX ORDER — CAPECITABINE 500 MG/1
825 TABLET, FILM COATED ORAL 2 TIMES DAILY
Qty: 36 TABLET | Refills: 0 | Status: SHIPPED | OUTPATIENT
Start: 2022-09-02 | End: 2022-10-11

## 2022-09-02 NOTE — TELEPHONE ENCOUNTER
Incoming call from patient. Per patient only has 9 tablets of capecitabine; enough for the second dose 9/2 and all of 9/6. Radiation completes 9/13 pat needs 5 more days (36 tablets of capecitabine). Will message provider.  Called pt to confirm on hand count. Pt should have several more days.

## 2022-09-02 NOTE — TELEPHONE ENCOUNTER
Reached out to patient. Per patient she has not missed any radiation since starting (Monday through Friday). Messaged provider to confirm.

## 2022-09-02 NOTE — TELEPHONE ENCOUNTER
Specialty Pharmacy - Refill Coordination    Specialty Medication Orders Linked to Encounter      Flowsheet Row Most Recent Value   Medication #1 capecitabine (XELODA) 500 MG Tab (Order#[id number], Rx#[id number])          Counseled patient on DDI with difulucan and xeloda. Counseled to monitor for palpitations and chest pain.    New prescription for Xeloda to complete therapy. Dose is appropriate.   Refill Questions - Documented Responses      Flowsheet Row Most Recent Value   Refill Screening Questions    Changes to allergies? No   Changes to medications? Yes  [Added diflucan]   New conditions since last clinic visit? No   Unplanned office visit, urgent care, ED, or hospital admission in the last 4 weeks? No   How does patient/caregiver feel medication is working? Good   Financial problems or insurance changes? No   How many doses of your specialty medications were missed in the last 4 weeks? 0   Would patient like to speak to a pharmacist? No   When does the patient need to receive the medication? 09/06/22   Refill Delivery Questions    How will the patient receive the medication? Mail   When does the patient need to receive the medication? 09/06/22   Shipping Address Home   Address in [facility] confirmed and updated if neccessary? Yes   Expected Copay ($) 0   Is the patient able to afford the medication copay? Yes   Payment Method zero copay   Days supply of Refill 6            Current Outpatient Medications   Medication Sig    aspirin 81 MG Chew Take 81 mg by mouth.    capecitabine (XELODA) 500 MG Tab Take 3 tablets (1,500 mg total) by mouth 2 (two) times daily Take as directed Monday, Tuesday, Wednesday, Thursday, and Friday for the duration of radiation therapy. Take with food.    ferrous sulfate 324 mg (65 mg iron) TbEC     HYDROcodone-acetaminophen (NORCO) 5-325 mg per tablet Take 1 tablet by mouth every 12 (twelve) hours as needed for Pain.    multivitamin capsule     nitroGLYCERIN (NITROSTAT) 0.4 MG  SL tablet Place 0.4 mg under the tongue.    ondansetron (ZOFRAN) 8 MG tablet Take 1 tablet (8 mg total) by mouth every 8 (eight) hours as needed for Nausea.   Last reviewed on 8/23/2022  1:31 PM by Almaz Lane MA    Review of patient's allergies indicates:   Allergen Reactions    Percocet [oxycodone-acetaminophen] Other (See Comments)     dizziness    Last reviewed on  9/2/2022 11:06 AM by Patti Webb      Tasks added this encounter   9/14/2022 - Refill Call (Auto Added)   Tasks due within next 3 months   No tasks due.     Sarika Sahni, PharmD  [facility]  [address] [address]  Phone: [phone]  Fax: [phone]

## 2022-09-06 ENCOUNTER — OFFICE VISIT (OUTPATIENT)
Dept: HEMATOLOGY/ONCOLOGY | Facility: CLINIC | Age: 62
End: 2022-09-06
Payer: MEDICAID

## 2022-09-06 VITALS
HEART RATE: 91 BPM | HEIGHT: 61 IN | DIASTOLIC BLOOD PRESSURE: 77 MMHG | BODY MASS INDEX: 27.75 KG/M2 | WEIGHT: 147 LBS | OXYGEN SATURATION: 95 % | RESPIRATION RATE: 20 BRPM | SYSTOLIC BLOOD PRESSURE: 132 MMHG

## 2022-09-06 DIAGNOSIS — C20 RECTAL CANCER: ICD-10-CM

## 2022-09-06 DIAGNOSIS — G89.3 CANCER RELATED PAIN: Primary | ICD-10-CM

## 2022-09-06 PROCEDURE — 3078F DIAST BP <80 MM HG: CPT | Mod: CPTII,S$GLB,, | Performed by: NURSE PRACTITIONER

## 2022-09-06 PROCEDURE — 99214 OFFICE O/P EST MOD 30 MIN: CPT | Mod: S$GLB,,, | Performed by: NURSE PRACTITIONER

## 2022-09-06 PROCEDURE — 3008F BODY MASS INDEX DOCD: CPT | Mod: CPTII,S$GLB,, | Performed by: NURSE PRACTITIONER

## 2022-09-06 PROCEDURE — 99214 PR OFFICE/OUTPT VISIT, EST, LEVL IV, 30-39 MIN: ICD-10-PCS | Mod: S$GLB,,, | Performed by: NURSE PRACTITIONER

## 2022-09-06 PROCEDURE — 3075F SYST BP GE 130 - 139MM HG: CPT | Mod: CPTII,S$GLB,, | Performed by: NURSE PRACTITIONER

## 2022-09-06 PROCEDURE — 3078F PR MOST RECENT DIASTOLIC BLOOD PRESSURE < 80 MM HG: ICD-10-PCS | Mod: CPTII,S$GLB,, | Performed by: NURSE PRACTITIONER

## 2022-09-06 PROCEDURE — 1159F PR MEDICATION LIST DOCUMENTED IN MEDICAL RECORD: ICD-10-PCS | Mod: CPTII,S$GLB,, | Performed by: NURSE PRACTITIONER

## 2022-09-06 PROCEDURE — 1159F MED LIST DOCD IN RCRD: CPT | Mod: CPTII,S$GLB,, | Performed by: NURSE PRACTITIONER

## 2022-09-06 PROCEDURE — 3075F PR MOST RECENT SYSTOLIC BLOOD PRESS GE 130-139MM HG: ICD-10-PCS | Mod: CPTII,S$GLB,, | Performed by: NURSE PRACTITIONER

## 2022-09-06 PROCEDURE — 3008F PR BODY MASS INDEX (BMI) DOCUMENTED: ICD-10-PCS | Mod: CPTII,S$GLB,, | Performed by: NURSE PRACTITIONER

## 2022-09-06 RX ORDER — HYDROCODONE BITARTRATE AND ACETAMINOPHEN 10; 325 MG/1; MG/1
1 TABLET ORAL EVERY 8 HOURS PRN
Qty: 90 TABLET | Refills: 0 | Status: SHIPPED | OUTPATIENT
Start: 2022-09-06 | End: 2022-10-04 | Stop reason: SDUPTHER

## 2022-09-06 NOTE — PROGRESS NOTES
21-Jul-2022 MEDICAL ONCOLOGY FOLLOW UP CONSULTATION NOTE    Patient ID: Yulissa Munguia is a 61 y.o. female.    Chief Complaint: Rectal cancer    HPI:  Patient is a 61-year-old female who was previously diagnosed with rectal cancer about a year back and was seen and evaluated by [facility] with recommendations for chemo XRT for for presumed rectal cancer stage III A. Patient however was reluctant to get any treatment and did not pursue any medical care since then.  She now was referred by her primary care provider after patient was dismissed from [facility].  She presents to our clinic today with her daughters for further evaluation.  She still does not seem to be interested in pursuing chemo or radiation options but would like to have a discussion about her prognosis.  She also reports having a rectal tube placed recently in [place] but I have no records to suggest that it was done recently and for what indication.  She continues to smoke every day and does not seem to actually believe in her cancer diagnosis.      Social History     Socioeconomic History    Marital status:    Tobacco Use    Smoking status: Every Day     Years: 20.00     Types: Cigarettes    Smokeless tobacco: Never    Tobacco comments:     would be interested in smoking cessation in the future   Substance and Sexual Activity    Alcohol use: Not Currently    Drug use: Not Currently     Past Surgical History:   Procedure Laterality Date    ADENOIDECTOMY       SECTION      TONSILLECTOMY           Review of systems:  Review of Systems   Constitutional:  Positive for activity change and appetite change. Negative for chills, diaphoresis, fatigue and unexpected weight change.   HENT:  Negative for congestion, facial swelling, hearing loss, mouth sores, trouble swallowing and voice change.    Eyes:  Negative for photophobia, pain, discharge and itching.   Respiratory:  Negative for apnea, cough, choking, chest tightness  and shortness of breath.    Cardiovascular:  Negative for chest pain, palpitations and leg swelling.   Gastrointestinal:  Positive for rectal pain. Negative for abdominal distention, abdominal pain, anal bleeding and blood in stool.   Endocrine: Negative for cold intolerance, heat intolerance, polydipsia and polyphagia.   Genitourinary:  Negative for difficulty urinating, dysuria, flank pain and hematuria.   Musculoskeletal:  Negative for arthralgias, back pain, joint swelling, myalgias, neck pain and neck stiffness.   Skin:  Negative for color change, pallor and wound.   Allergic/Immunologic: Negative for environmental allergies, food allergies and immunocompromised state.   Neurological:  Negative for dizziness, seizures, facial asymmetry, speech difficulty, light-headedness, numbness and headaches.   Hematological:  Negative for adenopathy. Does not bruise/bleed easily.   Psychiatric/Behavioral:  Negative for agitation, behavioral problems, confusion, decreased concentration and sleep disturbance.            Physical Exam  Vitals and nursing note reviewed.   Constitutional:       General: She is not in acute distress.     Appearance: Normal appearance. She is not ill-appearing.   HENT:      Head: Normocephalic and atraumatic.      Nose: No congestion or rhinorrhea.   Eyes:      General: No scleral icterus.     Extraocular Movements: Extraocular movements intact.      Pupils: Pupils are equal, round, and reactive to light.   Cardiovascular:      Rate and Rhythm: Normal rate and regular rhythm.      Pulses: Normal pulses.      Heart sounds: Normal heart sounds. No murmur heard.    No gallop.   Pulmonary:      Effort: Pulmonary effort is normal. No respiratory distress.      Breath sounds: Normal breath sounds. No stridor. No wheezing or rhonchi.   Abdominal:      General: Bowel sounds are normal. There is no distension.      Palpations: There is no mass.      Tenderness: There is no abdominal tenderness. There is  no guarding.   Musculoskeletal:         General: No swelling, tenderness, deformity or signs of injury. Normal range of motion.      Cervical back: Normal range of motion and neck supple. No rigidity. No muscular tenderness.      Right lower leg: No edema.      Left lower leg: No edema.   Skin:     General: Skin is warm.      Coloration: Skin is not jaundiced or pale.      Findings: No bruising or lesion.   Neurological:      General: No focal deficit present.      Mental Status: She is alert and oriented to person, place, and time.      Cranial Nerves: No cranial nerve deficit.      Sensory: No sensory deficit.      Motor: No weakness.      Gait: Gait normal.   Psychiatric:         Mood and Affect: Mood normal.         Behavior: Behavior normal.         Thought Content: Thought content normal.     Vitals:    09/06/22 1005   BP: 132/77   Pulse: 91   Resp: 20   Body surface area is 1.69 meters squared.    Assessment/Plan:      ECOG 1     1. Rectal Cancer:    == Initially diagnosed in July 2021 as stage IIIA rectal cancer.  She has not followed and not pursued any treatment since then as she is skeptical about chemotherapy and side effects of radiation.  She believes an alternate treatment options which she has likely been doing since that time.  After recent dismissal from [facility] she presents to us for further discussion of treatment and management options.  == I discussed with her that restaging scan would be needed to accurately determine her current cancer stage and based on that would recommend treatment with either palliative or curative intent.  She has neglected rectal cancer over the last 1 year at this time and I am strongly suspecting metastasis is with stage IV disease.  I discussed with her that this is an Oncology Clinic and we would only talk about standard treatment with chemotherapy plus-minus radiation options.  Patient is more interested interested in alternative treatment  discussions and I discussed with her that this is not something we do in this clinic.  She voiced understanding but would like to get her PET scan done and then return to clinic for further discussion.  She is not currently agreeable to chemotherapy but would like to discuss this further once PET scan results are back.  == 7/14/22:  Patient underwent PET scan 07/08/2022 which showed continued thickening of the rectal wall extending into the anal region with increased radiotracer uptake.  This continues to extend over about 6 cm in length.  Increased radiotracer uptake remains present in the area with an SUV of approximately 22.5 compared 24.4 on the previous examination.  Indistinctness of the fat planes posteriorly remain present consistent with extra colonic extension.  There continues to be hypermetabolic lymph nodes in the inguinal region bilaterally short axis diameter is on the order of about 12 mm are present with SUVs on the order of approximately 3.4.  A mildly hypermetabolic lymph node remains present along the left sidewall of the pelvis having short axis diameter of approximately 7 mm today versus 10 mm on the previous examination.  The SUV in this area is approximately 2.1 versus 5.8 on previous exam. Considering her original diagnosis as Stage IIIA with outside oncologist, this PET scan shows it is likely, T4a with extracolonic extension and N1b with 2-3 regional LN involvement. I discussed with her about chemo-XRT  and would like to talk to radiation oncology first before she decides to pursue any treatment. I will hence make referral to Rad-onc in this regard  ==07/21/2022: Patient reports that she would like to pursue chemoradiaton. She has appointment today for radiation evaluation with Dr. Contreras. I discussed with Dr. Herndon and if she is candidate for radiation plan is for concurrent XRT with 5-fu. She will need mediport placement. Referral placed.   ==07/28/2022:  Pt here today to discuss  upcoming chemotherapy, side effect profile, risk versus benefits, and expected outcomes have been discussed today. All questions and concerns answered and consents have been signed.After discussion with XRT and Dr. Herndon plan is to proceed with concurrent XRT and Xeloda. Xeloda dosage calculated with only 500mg tablets due to patient getting very confused with medications and having difficulty repeating proper dosage back after multiple attempts at teaching. She is poor candidate receive combination of 500mg and 150mg and will likely confuse them and take improper dose. She also reports taking too many to name or remember herbs and supplements which are not on medication list. I instructed her to contact clinic with list to ensure no interactions.   == 8/9/22: tolerating chemo/xrt well, states she is feeling much better since starting treatment. She does have some rectal pressure and pain, taking norco 1-2 times day   == 8/23/22: compliant with chemo/xrt, continues with rectal pain, refill of norco provided today. Labs reviewed from last week, show mild anemia.    2. Pain Management:    == I discussed with her that we have a very strict policy for pain medications and she will need to sign a pain contract. If she decides to pursue treatment for her rectal cancer then she can be started on pain medications. XRT could also help in this regard  == Norco 5/325 mg PO BID prn is what she seems to be taking at this time, after her last prescription from her ER visit.  == 9/6/22: patient walked in to clinic today stating her pain is 10/10 and she is out of pain medications. Discussed with Dr Contreras and he recommends increasing dosage due to skin excoriation related to rectal xrt, will increase to Norco 10/325 TID and will assess at her visit on Thursday.     3. Constipation  Advised to take miralax and senokot   Limit opioid usage  Improving with treatment    Plan:  Continue Xeloda /xrt  Weekly cbc cmp at XRT  RTC  2 weeks            Total time spent in counseling and discussion about further management options including relevant lab work, treatment,  prognosis, medications and intended side effects was more than 30 minutes. More than 50% of the time was spent on counseling and coordination of care.  This includes face to face time and non-face to face time preparing to see the patient (eg, review of tests), Obtaining and/or reviewing separately obtained history, Documenting clinical information in the electronic or other health record, Independently interpreting resultsand communicating results to the patient/family/caregiver, or Care coordination.                  27-Jul-2022

## 2022-09-07 ENCOUNTER — TELEPHONE (OUTPATIENT)
Dept: HEMATOLOGY/ONCOLOGY | Facility: CLINIC | Age: 62
End: 2022-09-07
Payer: MEDICAID

## 2022-09-07 NOTE — TELEPHONE ENCOUNTER
Incoming call for pt asking since shipment arriving today 9/7 how does she take the dose. Informed pt that [facility] cannot give recommendation since unsure when medication will arrive. Encouraged pt to reach out to provider.

## 2022-09-07 NOTE — TELEPHONE ENCOUNTER
----- Message from Jina Greene sent at 9/7/2022  1:24 PM CDT -----  Contact: Patient  Patient need the nurse to call her regarding instructions to taking her chemo pills      #  [phone]

## 2022-09-08 ENCOUNTER — OFFICE VISIT (OUTPATIENT)
Dept: HEMATOLOGY/ONCOLOGY | Facility: CLINIC | Age: 62
End: 2022-09-08
Payer: MEDICAID

## 2022-09-08 VITALS
HEART RATE: 92 BPM | DIASTOLIC BLOOD PRESSURE: 60 MMHG | OXYGEN SATURATION: 98 % | BODY MASS INDEX: 27.19 KG/M2 | SYSTOLIC BLOOD PRESSURE: 106 MMHG | HEIGHT: 61 IN | RESPIRATION RATE: 16 BRPM | WEIGHT: 144 LBS

## 2022-09-08 DIAGNOSIS — C20 RECTAL CANCER: Primary | ICD-10-CM

## 2022-09-08 PROCEDURE — 3078F PR MOST RECENT DIASTOLIC BLOOD PRESSURE < 80 MM HG: ICD-10-PCS | Mod: CPTII,S$GLB,, | Performed by: NURSE PRACTITIONER

## 2022-09-08 PROCEDURE — 1160F RVW MEDS BY RX/DR IN RCRD: CPT | Mod: CPTII,S$GLB,, | Performed by: NURSE PRACTITIONER

## 2022-09-08 PROCEDURE — 99214 PR OFFICE/OUTPT VISIT, EST, LEVL IV, 30-39 MIN: ICD-10-PCS | Mod: S$GLB,,, | Performed by: NURSE PRACTITIONER

## 2022-09-08 PROCEDURE — 3008F PR BODY MASS INDEX (BMI) DOCUMENTED: ICD-10-PCS | Mod: CPTII,S$GLB,, | Performed by: NURSE PRACTITIONER

## 2022-09-08 PROCEDURE — 99214 OFFICE O/P EST MOD 30 MIN: CPT | Mod: S$GLB,,, | Performed by: NURSE PRACTITIONER

## 2022-09-08 PROCEDURE — 1160F PR REVIEW ALL MEDS BY PRESCRIBER/CLIN PHARMACIST DOCUMENTED: ICD-10-PCS | Mod: CPTII,S$GLB,, | Performed by: NURSE PRACTITIONER

## 2022-09-08 PROCEDURE — 1159F PR MEDICATION LIST DOCUMENTED IN MEDICAL RECORD: ICD-10-PCS | Mod: CPTII,S$GLB,, | Performed by: NURSE PRACTITIONER

## 2022-09-08 PROCEDURE — 3074F PR MOST RECENT SYSTOLIC BLOOD PRESSURE < 130 MM HG: ICD-10-PCS | Mod: CPTII,S$GLB,, | Performed by: NURSE PRACTITIONER

## 2022-09-08 PROCEDURE — 3078F DIAST BP <80 MM HG: CPT | Mod: CPTII,S$GLB,, | Performed by: NURSE PRACTITIONER

## 2022-09-08 PROCEDURE — 3074F SYST BP LT 130 MM HG: CPT | Mod: CPTII,S$GLB,, | Performed by: NURSE PRACTITIONER

## 2022-09-08 PROCEDURE — 3008F BODY MASS INDEX DOCD: CPT | Mod: CPTII,S$GLB,, | Performed by: NURSE PRACTITIONER

## 2022-09-08 PROCEDURE — 1159F MED LIST DOCD IN RCRD: CPT | Mod: CPTII,S$GLB,, | Performed by: NURSE PRACTITIONER

## 2022-09-08 NOTE — PROGRESS NOTES
MEDICAL ONCOLOGY FOLLOW UP CONSULTATION NOTE    Patient ID: Yulissa Munguia is a 61 y.o. female.    Chief Complaint: Rectal cancer    HPI:  Patient is a 61-year-old female who was previously diagnosed with rectal cancer about a year back and was seen and evaluated by [facility] group with recommendations for chemo XRT for for presumed rectal cancer stage III A. Patient however was reluctant to get any treatment and did not pursue any medical care since then.  She now was referred by her primary care provider after patient was dismissed from [facility].  She presents to our clinic today with her daughters for further evaluation.  She still does not seem to be interested in pursuing chemo or radiation options but would like to have a discussion about her prognosis.  She also reports having a rectal tube placed recently in [place] but I have no records to suggest that it was done recently and for what indication.  She continues to smoke every day and does not seem to actually believe in her cancer diagnosis.      Social History     Socioeconomic History    Marital status:    Tobacco Use    Smoking status: Every Day     Years: 20.00     Types: Cigarettes    Smokeless tobacco: Never    Tobacco comments:     would be interested in smoking cessation in the future   Substance and Sexual Activity    Alcohol use: Not Currently    Drug use: Not Currently     Past Surgical History:   Procedure Laterality Date    ADENOIDECTOMY       SECTION      TONSILLECTOMY           Review of systems:  Review of Systems   Constitutional:  Positive for activity change and appetite change. Negative for chills, diaphoresis, fatigue and unexpected weight change.   HENT:  Negative for congestion, facial swelling, hearing loss, mouth sores, trouble swallowing and voice change.    Eyes:  Negative for photophobia, pain, discharge and itching.   Respiratory:  Negative for apnea, cough, choking, chest tightness  and shortness of breath.    Cardiovascular:  Negative for chest pain, palpitations and leg swelling.   Gastrointestinal:  Positive for rectal pain. Negative for abdominal distention, abdominal pain, anal bleeding and blood in stool.   Endocrine: Negative for cold intolerance, heat intolerance, polydipsia and polyphagia.   Genitourinary:  Negative for difficulty urinating, dysuria, flank pain and hematuria.   Musculoskeletal:  Negative for arthralgias, back pain, joint swelling, myalgias, neck pain and neck stiffness.   Skin:  Negative for color change, pallor and wound.   Allergic/Immunologic: Negative for environmental allergies, food allergies and immunocompromised state.   Neurological:  Negative for dizziness, seizures, facial asymmetry, speech difficulty, light-headedness, numbness and headaches.   Hematological:  Negative for adenopathy. Does not bruise/bleed easily.   Psychiatric/Behavioral:  Negative for agitation, behavioral problems, confusion, decreased concentration and sleep disturbance.            Physical Exam  Vitals and nursing note reviewed.   Constitutional:       General: She is not in acute distress.     Appearance: Normal appearance. She is not ill-appearing.   HENT:      Head: Normocephalic and atraumatic.      Nose: No congestion or rhinorrhea.   Eyes:      General: No scleral icterus.     Extraocular Movements: Extraocular movements intact.      Pupils: Pupils are equal, round, and reactive to light.   Cardiovascular:      Rate and Rhythm: Normal rate and regular rhythm.      Pulses: Normal pulses.      Heart sounds: Normal heart sounds. No murmur heard.    No gallop.   Pulmonary:      Effort: Pulmonary effort is normal. No respiratory distress.      Breath sounds: Normal breath sounds. No stridor. No wheezing or rhonchi.   Abdominal:      General: Bowel sounds are normal. There is no distension.      Palpations: There is no mass.      Tenderness: There is no abdominal tenderness. There is  no guarding.   Musculoskeletal:         General: No swelling, tenderness, deformity or signs of injury. Normal range of motion.      Cervical back: Normal range of motion and neck supple. No rigidity. No muscular tenderness.      Right lower leg: No edema.      Left lower leg: No edema.   Skin:     General: Skin is warm.      Coloration: Skin is not jaundiced or pale.      Findings: No bruising or lesion.   Neurological:      General: No focal deficit present.      Mental Status: She is alert and oriented to person, place, and time.      Cranial Nerves: No cranial nerve deficit.      Sensory: No sensory deficit.      Motor: No weakness.      Gait: Gait normal.   Psychiatric:         Mood and Affect: Mood normal.         Behavior: Behavior normal.         Thought Content: Thought content normal.     Vitals:    09/08/22 0932   BP: 106/60   Pulse: 92   Resp: 16   Body surface area is 1.68 meters squared.    IMAGING:          Assessment/Plan:      ECOG 1     1. Rectal Cancer:    == Initially diagnosed in July 2021 as stage IIIA rectal cancer.  She has not followed and not pursued any treatment since then as she is skeptical about chemotherapy and side effects of radiation.  She believes an alternate treatment options which she has likely been doing since that time.  After recent dismissal from [facility] she presents to us for further discussion of treatment and management options.  == I discussed with her that restaging scan would be needed to accurately determine her current cancer stage and based on that would recommend treatment with either palliative or curative intent.  She has neglected rectal cancer over the last 1 year at this time and I am strongly suspecting metastasis is with stage IV disease.  I discussed with her that this is an Oncology Clinic and we would only talk about standard treatment with chemotherapy plus-minus radiation options.  Patient is more interested interested in alternative  treatment discussions and I discussed with her that this is not something we do in this clinic.  She voiced understanding but would like to get her PET scan done and then return to clinic for further discussion.  She is not currently agreeable to chemotherapy but would like to discuss this further once PET scan results are back.  == 7/14/22:  Patient underwent PET scan 07/08/2022 which showed continued thickening of the rectal wall extending into the anal region with increased radiotracer uptake.  This continues to extend over about 6 cm in length.  Increased radiotracer uptake remains present in the area with an SUV of approximately 22.5 compared 24.4 on the previous examination.  Indistinctness of the fat planes posteriorly remain present consistent with extra colonic extension.  There continues to be hypermetabolic lymph nodes in the inguinal region bilaterally short axis diameter is on the order of about 12 mm are present with SUVs on the order of approximately 3.4.  A mildly hypermetabolic lymph node remains present along the left sidewall of the pelvis having short axis diameter of approximately 7 mm today versus 10 mm on the previous examination.  The SUV in this area is approximately 2.1 versus 5.8 on previous exam. Considering her original diagnosis as Stage IIIA with outside oncologist, this PET scan shows it is likely, T4a with extracolonic extension and N1b with 2-3 regional LN involvement. I discussed with her about chemo-XRT  and would like to talk to radiation oncology first before she decides to pursue any treatment. I will hence make referral to Rad-onc in this regard  ==07/21/2022: Patient reports that she would like to pursue chemoradiaton. She has appointment today for radiation evaluation with Dr. Contreras. I discussed with Dr. Herndon and if she is candidate for radiation plan is for concurrent XRT with 5-fu. She will need mediport placement. Referral placed.   ==07/28/2022:  Pt here today to  discuss upcoming chemotherapy, side effect profile, risk versus benefits, and expected outcomes have been discussed today. All questions and concerns answered and consents have been signed.After discussion with JACINTO and Dr. Herndon plan is to proceed with concurrent XRT and Xeloda. Xeloda dosage calculated with only 500mg tablets due to patient getting very confused with medications and having difficulty repeating proper dosage back after multiple attempts at teaching. She is poor candidate receive combination of 500mg and 150mg and will likely confuse them and take improper dose. She also reports taking too many to name or remember herbs and supplements which are not on medication list. I instructed her to contact clinic with list to ensure no interactions.   == 8/9/22: tolerating chemo/xrt well, states she is feeling much better since starting treatment. She does have some rectal pressure and pain, taking norco 1-2 times day   == 8/23/22: compliant with chemo/xrt, continues with rectal pain, refill of norco provided today. Labs reviewed from last week, show mild anemia.  == 9/8/22:  Pain better controlled with Norco 10/03/2025 patient continues to take medication 3 times a day.  Reports radiation to complete on Thursday patient is aware she will discontinue Xeloda after Thursday.  Labs reviewed and patient will return to clinic in 3 weeks to evaluate pain management    2. Pain Management:    == I discussed with her that we have a very strict policy for pain medications and she will need to sign a pain contract. If she decides to pursue treatment for her rectal cancer then she can be started on pain medications. XRT could also help in this regard  == Norco 5/325 mg PO BID prn is what she seems to be taking at this time, after her last prescription from her ER visit.  == 9/6/22: patient walked in to clinic today stating her pain is 10/10 and she is out of pain medications. Discussed with Dr Contreras and he recommends  increasing dosage due to skin excoriation related to rectal xrt, will increase to Norco 10/325 TID and will assess at her visit on Thursday.     3. Constipation  Advised to take miralax and senokot   Limit opioid usage  Improving with treatment    Plan:  To complete Xeloda /xrt on 09/10/2022  RTC  3 weeks to discuss pain management and begin deescalation of narcotic  PET-CT due mid October        Total time spent in counseling and discussion about further management options including relevant lab work, treatment,  prognosis, medications and intended side effects was more than 30 minutes. More than 50% of the time was spent on counseling and coordination of care.  This includes face to face time and non-face to face time preparing to see the patient (eg, review of tests), Obtaining and/or reviewing separately obtained history, Documenting clinical information in the electronic or other health record, Independently interpreting resultsand communicating results to the patient/family/caregiver, or Care coordination.

## 2022-09-14 ENCOUNTER — SPECIALTY PHARMACY (OUTPATIENT)
Dept: PHARMACY | Facility: CLINIC | Age: 62
End: 2022-09-14
Payer: MEDICAID

## 2022-09-14 NOTE — TELEPHONE ENCOUNTER
Outgoing call to patient to confirm if xeloda/radiation was completed and disposal. During discussion pt had to go and will call back

## 2022-09-19 NOTE — TELEPHONE ENCOUNTER
Patient confirm she has completed chemoradiation. Additionally, She was counseled on proper disposal of unused Xeloda. She can bring unused xeloda to [facility] for disposal or bring unused xeloda to her provider's office. Patient expressed understanding.     Closing encounter.

## 2022-09-29 ENCOUNTER — OFFICE VISIT (OUTPATIENT)
Dept: HEMATOLOGY/ONCOLOGY | Facility: CLINIC | Age: 62
End: 2022-09-29
Payer: MEDICAID

## 2022-09-29 DIAGNOSIS — G89.3 CANCER RELATED PAIN: ICD-10-CM

## 2022-09-29 DIAGNOSIS — C20 RECTAL CANCER: Primary | ICD-10-CM

## 2022-09-29 DIAGNOSIS — K59.00 CONSTIPATION, UNSPECIFIED CONSTIPATION TYPE: ICD-10-CM

## 2022-09-29 PROCEDURE — 1159F PR MEDICATION LIST DOCUMENTED IN MEDICAL RECORD: ICD-10-PCS | Mod: CPTII,S$GLB,, | Performed by: NURSE PRACTITIONER

## 2022-09-29 PROCEDURE — 3008F BODY MASS INDEX DOCD: CPT | Mod: CPTII,S$GLB,, | Performed by: NURSE PRACTITIONER

## 2022-09-29 PROCEDURE — 3074F PR MOST RECENT SYSTOLIC BLOOD PRESSURE < 130 MM HG: ICD-10-PCS | Mod: CPTII,S$GLB,, | Performed by: NURSE PRACTITIONER

## 2022-09-29 PROCEDURE — 99215 PR OFFICE/OUTPT VISIT, EST, LEVL V, 40-54 MIN: ICD-10-PCS | Mod: S$GLB,,, | Performed by: NURSE PRACTITIONER

## 2022-09-29 PROCEDURE — 99215 OFFICE O/P EST HI 40 MIN: CPT | Mod: S$GLB,,, | Performed by: NURSE PRACTITIONER

## 2022-09-29 PROCEDURE — 3008F PR BODY MASS INDEX (BMI) DOCUMENTED: ICD-10-PCS | Mod: CPTII,S$GLB,, | Performed by: NURSE PRACTITIONER

## 2022-09-29 PROCEDURE — 1160F RVW MEDS BY RX/DR IN RCRD: CPT | Mod: CPTII,S$GLB,, | Performed by: NURSE PRACTITIONER

## 2022-09-29 PROCEDURE — 3078F PR MOST RECENT DIASTOLIC BLOOD PRESSURE < 80 MM HG: ICD-10-PCS | Mod: CPTII,S$GLB,, | Performed by: NURSE PRACTITIONER

## 2022-09-29 PROCEDURE — 1160F PR REVIEW ALL MEDS BY PRESCRIBER/CLIN PHARMACIST DOCUMENTED: ICD-10-PCS | Mod: CPTII,S$GLB,, | Performed by: NURSE PRACTITIONER

## 2022-09-29 PROCEDURE — 1159F MED LIST DOCD IN RCRD: CPT | Mod: CPTII,S$GLB,, | Performed by: NURSE PRACTITIONER

## 2022-09-29 PROCEDURE — 3074F SYST BP LT 130 MM HG: CPT | Mod: CPTII,S$GLB,, | Performed by: NURSE PRACTITIONER

## 2022-09-29 PROCEDURE — 3078F DIAST BP <80 MM HG: CPT | Mod: CPTII,S$GLB,, | Performed by: NURSE PRACTITIONER

## 2022-09-29 RX ORDER — HYDROCODONE BITARTRATE AND ACETAMINOPHEN 10; 325 MG/1; MG/1
1 TABLET ORAL EVERY 8 HOURS PRN
Qty: 90 TABLET | Refills: 0 | Status: CANCELLED | OUTPATIENT
Start: 2022-09-29

## 2022-09-29 NOTE — PROGRESS NOTES
MEDICAL ONCOLOGY FOLLOW UP CONSULTATION NOTE    Patient ID: Yulissa Munguia is a 61 y.o. female.    Chief Complaint: Rectal cancer    HPI:  Patient is a 61-year-old female who was previously diagnosed with rectal cancer about a year back and was seen and evaluated by [facility] with recommendations for chemo XRT for for presumed rectal cancer stage III A. Patient however was reluctant to get any treatment and did not pursue any medical care since then.  She now was referred by her primary care provider after patient was dismissed from [facility].  She presents to our clinic today with her daughters for further evaluation.  She still does not seem to be interested in pursuing chemo or radiation options but would like to have a discussion about her prognosis.  She also reports having a rectal tube placed recently in [place] but I have no records to suggest that it was done recently and for what indication.  She continues to smoke every day and does not seem to actually believe in her cancer diagnosis.      Social History     Socioeconomic History    Marital status:    Tobacco Use    Smoking status: Every Day     Years: 20.00     Types: Cigarettes    Smokeless tobacco: Never    Tobacco comments:     would be interested in smoking cessation in the future   Substance and Sexual Activity    Alcohol use: Not Currently    Drug use: Not Currently     Past Surgical History:   Procedure Laterality Date    ADENOIDECTOMY       SECTION      TONSILLECTOMY           Review of systems:  Review of Systems   Constitutional:  Positive for activity change and appetite change. Negative for chills, diaphoresis, fatigue and unexpected weight change.   HENT:  Negative for congestion, facial swelling, hearing loss, mouth sores, trouble swallowing and voice change.    Eyes:  Negative for photophobia, pain, discharge and itching.   Respiratory:  Negative for apnea, cough, choking, chest tightness  and shortness of breath.    Cardiovascular:  Negative for chest pain, palpitations and leg swelling.   Gastrointestinal:  Positive for rectal pain. Negative for abdominal distention, abdominal pain, anal bleeding and blood in stool.   Endocrine: Negative for cold intolerance, heat intolerance, polydipsia and polyphagia.   Genitourinary:  Negative for difficulty urinating, dysuria, flank pain and hematuria.   Musculoskeletal:  Negative for arthralgias, back pain, joint swelling, myalgias, neck pain and neck stiffness.   Skin:  Negative for color change, pallor and wound.   Allergic/Immunologic: Negative for environmental allergies, food allergies and immunocompromised state.   Neurological:  Negative for dizziness, seizures, facial asymmetry, speech difficulty, light-headedness, numbness and headaches.   Hematological:  Negative for adenopathy. Does not bruise/bleed easily.   Psychiatric/Behavioral:  Negative for agitation, behavioral problems, confusion, decreased concentration and sleep disturbance.            Physical Exam  Vitals and nursing note reviewed.   Constitutional:       General: She is not in acute distress.     Appearance: Normal appearance. She is not ill-appearing.   HENT:      Head: Normocephalic and atraumatic.      Nose: No congestion or rhinorrhea.   Eyes:      General: No scleral icterus.     Extraocular Movements: Extraocular movements intact.      Pupils: Pupils are equal, round, and reactive to light.   Cardiovascular:      Rate and Rhythm: Normal rate and regular rhythm.      Pulses: Normal pulses.      Heart sounds: Normal heart sounds. No murmur heard.    No gallop.   Pulmonary:      Effort: Pulmonary effort is normal. No respiratory distress.      Breath sounds: Normal breath sounds. No stridor. No wheezing or rhonchi.   Abdominal:      General: Bowel sounds are normal. There is no distension.      Palpations: There is no mass.      Tenderness: There is no abdominal tenderness. There is  no guarding.   Musculoskeletal:         General: No swelling, tenderness, deformity or signs of injury. Normal range of motion.      Cervical back: Normal range of motion and neck supple. No rigidity. No muscular tenderness.      Right lower leg: No edema.      Left lower leg: No edema.   Skin:     General: Skin is warm.      Coloration: Skin is not jaundiced or pale.      Findings: No bruising or lesion.   Neurological:      General: No focal deficit present.      Mental Status: She is alert and oriented to person, place, and time.      Cranial Nerves: No cranial nerve deficit.      Sensory: No sensory deficit.      Motor: No weakness.      Gait: Gait normal.   Psychiatric:         Mood and Affect: Mood normal.         Behavior: Behavior normal.         Thought Content: Thought content normal.     Vitals:    09/29/22 1004   BP: 105/71   Pulse: 75   Resp: 16   Temp: 98.3 °F (36.8 °C)   Body surface area is 1.63 meters squared.    IMAGING:          Assessment/Plan:      ECOG 1     1. Rectal Cancer:    == Initially diagnosed in July 2021 as stage IIIA rectal cancer.  She has not followed and not pursued any treatment since then as she is skeptical about chemotherapy and side effects of radiation.  She believes an alternate treatment options which she has likely been doing since that time.  After recent dismissal from [facility] she presents to us for further discussion of treatment and management options.  == I discussed with her that restaging scan would be needed to accurately determine her current cancer stage and based on that would recommend treatment with either palliative or curative intent.  She has neglected rectal cancer over the last 1 year at this time and I am strongly suspecting metastasis is with stage IV disease.  I discussed with her that this is an Oncology Clinic and we would only talk about standard treatment with chemotherapy plus-minus radiation options.  Patient is more interested  interested in alternative treatment discussions and I discussed with her that this is not something we do in this clinic.  She voiced understanding but would like to get her PET scan done and then return to clinic for further discussion.  She is not currently agreeable to chemotherapy but would like to discuss this further once PET scan results are back.  == 7/14/22:  Patient underwent PET scan 07/08/2022 which showed continued thickening of the rectal wall extending into the anal region with increased radiotracer uptake.  This continues to extend over about 6 cm in length.  Increased radiotracer uptake remains present in the area with an SUV of approximately 22.5 compared 24.4 on the previous examination.  Indistinctness of the fat planes posteriorly remain present consistent with extra colonic extension.  There continues to be hypermetabolic lymph nodes in the inguinal region bilaterally short axis diameter is on the order of about 12 mm are present with SUVs on the order of approximately 3.4.  A mildly hypermetabolic lymph node remains present along the left sidewall of the pelvis having short axis diameter of approximately 7 mm today versus 10 mm on the previous examination.  The SUV in this area is approximately 2.1 versus 5.8 on previous exam. Considering her original diagnosis as Stage IIIA with outside oncologist, this PET scan shows it is likely, T4a with extracolonic extension and N1b with 2-3 regional LN involvement. I discussed with her about chemo-XRT  and would like to talk to radiation oncology first before she decides to pursue any treatment. I will hence make referral to Rad-onc in this regard  ==07/21/2022: Patient reports that she would like to pursue chemoradiaton. She has appointment today for radiation evaluation with Dr. Contreras. I discussed with Dr. Herndon and if she is candidate for radiation plan is for concurrent XRT with 5-fu. She will need mediport placement. Referral placed.    ==07/28/2022:  Pt here today to discuss upcoming chemotherapy, side effect profile, risk versus benefits, and expected outcomes have been discussed today. All questions and concerns answered and consents have been signed.After discussion with XRT and Dr. Herndon plan is to proceed with concurrent XRT and Xeloda. Xeloda dosage calculated with only 500mg tablets due to patient getting very confused with medications and having difficulty repeating proper dosage back after multiple attempts at teaching. She is poor candidate receive combination of 500mg and 150mg and will likely confuse them and take improper dose. She also reports taking too many to name or remember herbs and supplements which are not on medication list. I instructed her to contact clinic with list to ensure no interactions.   == 8/9/22: tolerating chemo/xrt well, states she is feeling much better since starting treatment. She does have some rectal pressure and pain, taking norco 1-2 times day   == 8/23/22: compliant with chemo/xrt, continues with rectal pain, refill of norco provided today. Labs reviewed from last week, show mild anemia.  == 9/8/22:  Pain better controlled with [place] 10/0325 patient continues to take medication 3 times a day.  Reports radiation to complete on Thursday patient is aware she will discontinue Xeloda after Thursday.  Labs reviewed and patient will return to clinic in 3 weeks to evaluate pain management  == 9/29/22: completed XRT/xeloda on 9/13/22, continues with constipation using miralax daily. Based on NCCN Guidelines we will proceed with Capox for 12-16 weeks. She will need a port placed prior to beginning.  Discussed with Dr Herndon     2. Pain Management:    == I discussed with her that we have a very strict policy for pain medications and she will need to sign a pain contract. If she decides to pursue treatment for her rectal cancer then she can be started on pain medications. XRT could also help in this regard  ==  Norco 5/325 mg PO BID prn is what she seems to be taking at this time, after her last prescription from her ER visit.  == 9/6/22: patient walked in to clinic today stating her pain is 10/10 and she is out of pain medications. Discussed with Dr Contreras and he recommends increasing dosage due to skin excoriation related to rectal xrt, will increase to Norco 10/325 TID and will assess at her visit on Thursday.     3. Constipation  Advised to take miralax and senokot   Limit opioid usage      Plan:    RTC  4 weeks to discuss port placement and upcoming chemotherapy          Total time spent in counseling and discussion about further management options including relevant lab work, treatment,  prognosis, medications and intended side effects was more than 60 minutes. More than 50% of the time was spent on counseling and coordination of care.  This includes face to face time and non-face to face time preparing to see the patient (eg, review of tests), Obtaining and/or reviewing separately obtained history, Documenting clinical information in the electronic or other health record, Independently interpreting resultsand communicating results to the patient/family/caregiver, or Care coordination.

## 2022-10-04 DIAGNOSIS — G89.3 CANCER RELATED PAIN: ICD-10-CM

## 2022-10-04 RX ORDER — HYDROCODONE BITARTRATE AND ACETAMINOPHEN 10; 325 MG/1; MG/1
1 TABLET ORAL EVERY 8 HOURS PRN
Qty: 90 TABLET | Refills: 0 | Status: SHIPPED | OUTPATIENT
Start: 2022-10-04 | End: 2022-11-07 | Stop reason: SDUPTHER

## 2022-10-04 RX ORDER — HYDROCODONE BITARTRATE AND ACETAMINOPHEN 10; 325 MG/1; MG/1
1 TABLET ORAL EVERY 8 HOURS PRN
Qty: 90 TABLET | Refills: 0 | OUTPATIENT
Start: 2022-10-04

## 2022-10-11 VITALS
TEMPERATURE: 98 F | HEART RATE: 75 BPM | BODY MASS INDEX: 25.86 KG/M2 | OXYGEN SATURATION: 97 % | SYSTOLIC BLOOD PRESSURE: 105 MMHG | HEIGHT: 61 IN | RESPIRATION RATE: 16 BRPM | WEIGHT: 137 LBS | DIASTOLIC BLOOD PRESSURE: 71 MMHG

## 2022-10-11 RX ORDER — CAPECITABINE 500 MG/1
1500 TABLET, FILM COATED ORAL 2 TIMES DAILY
Qty: 84 TABLET | Refills: 6 | Status: SHIPPED | OUTPATIENT
Start: 2022-10-24 | End: 2023-04-06

## 2022-10-12 ENCOUNTER — SPECIALTY PHARMACY (OUTPATIENT)
Dept: PHARMACY | Facility: CLINIC | Age: 62
End: 2022-10-12
Payer: MEDICAID

## 2022-10-12 NOTE — TELEPHONE ENCOUNTER
Script for Xeloda received. No PA required and estimated copay is $0. She completed XRT/Xeloda on 9/13/2022. Plan is to continue with Capox for 12-16 weeks.

## 2022-10-12 NOTE — TELEPHONE ENCOUNTER
Incoming call: Ms. Munguia wasn't able to reach her MDO but is expecting a call back from her later. She was quit upset at first but she has agreed for a call back tomorrow to continue the assessment with the hope that by then she would be on the same page with her provider.

## 2022-10-12 NOTE — TELEPHONE ENCOUNTER
Xeloda - Outgoing call. Pt asked to call us back. Patient was suprise that her MDO has ordered this medication. She ended the call so she can  reach out to her provider first before calling us back.

## 2022-10-13 RX ORDER — NITROFURANTOIN 25; 75 MG/1; MG/1
100 CAPSULE ORAL
COMMUNITY
End: 2022-11-01

## 2022-10-13 RX ORDER — OXYCODONE AND ACETAMINOPHEN 10; 325 MG/1; MG/1
1 TABLET ORAL EVERY 4 HOURS PRN
COMMUNITY
End: 2022-11-07 | Stop reason: CLARIF

## 2022-10-13 RX ORDER — TURMERIC 400 MG
CAPSULE ORAL
COMMUNITY

## 2022-10-13 NOTE — TELEPHONE ENCOUNTER
Specialty Pharmacy - Initial Clinical Assessment    Specialty Medication Orders Linked to Encounter      Flowsheet Row Most Recent Value   Medication #1 capecitabine (XELODA) 500 MG Tab (Order#[id number], Rx#[id number])          Patient Diagnosis   C20 - Rectal cancer    Subjective    Yulissa Munguia is a 61 y.o. female, who is followed by the specialty pharmacy service for management and education.    Recent Encounters       Date Type Provider Description    10/12/2022 Specialty Pharmacy Christofer Schulz PharmD Initial Clinical Assessment    10/12/2022 Specialty Pharmacy Farrah Sweeney, Mini Referral Authorization    09/14/2022 Specialty Pharmacy Sarika Sahni, Mini Clinical Intervention    09/02/2022 Specialty Pharmacy Sarika Sahni PharmD Refill Coordination; Referral Authorization    08/29/2022 Specialty Pharmacy Cassie Mccormick PharmD Clinical Intervention          Clinical call attempts since last clinical assessment   10/12/2022  9:40 PM - Specialty Pharmacy - Clinical Assessment by Christofer Schulz PharmD     Current Outpatient Medications   Medication Sig    oxyCODONE-acetaminophen (PERCOCET)  mg per tablet Take 1 tablet by mouth every 4 (four) hours as needed.    aspirin 81 MG Chew Take 81 mg by mouth.    [START ON 10/24/2022] capecitabine (XELODA) 500 MG Tab Take 3 tablets (1,500 mg total) by mouth 2 (two) times daily Take on days 1-14 of 21 day chemotherapy cycle.    ferrous sulfate 324 mg (65 mg iron) TbEC     HYDROcodone-acetaminophen (NORCO)  mg per tablet Take 1 tablet by mouth every 8 (eight) hours as needed for Pain.    multivitamin capsule     nitroGLYCERIN (NITROSTAT) 0.4 MG SL tablet Place 0.4 mg under the tongue.    ondansetron (ZOFRAN) 8 MG tablet Take 1 tablet (8 mg total) by mouth every 8 (eight) hours as needed for Nausea.   Last reviewed on 10/4/2022  3:38 PM by Patti Webb NP    Review of patient's allergies indicates:   Allergen Reactions     Percocet [oxycodone-acetaminophen] Other (See Comments)     dizziness   Last reviewed on  10/11/2022 1:28 PM by Patti Webb    Drug Interactions    Drug interactions evaluated: yes  Clinically relevant drug interactions identified: no  Provided the patient with educational material regarding drug interactions: not applicable         Adverse Effects    Urine decreased: Pos (Comment: UTI - On Macrobid)  *All other systems reviewed and are negative       Assessment Questions - Documented Responses      Flowsheet Row Most Recent Value   Assessment    Medication Reconciliation completed for patient Yes   During the past 4 weeks, has patient missed any activities due to condition or medication? No   During the past 4 weeks, did patient have any of the following urgent care visits? None   Goals of Therapy Status Discussed (new start)  [Restart]   Status of the patients ability to self-administer: Is Able   All education points have been covered with patient? No, patient declined- printed education provided  [Restart]   Welcome packet contents reviewed and discussed with patient? Yes   Assesment completed? Yes   Plan Therapy being initiated   Do you need to open a clinical intervention (i-vent)? No   Do you want to schedule first shipment? Yes   Medication #1 Assessment Info    Patient status New to [facility], Existing medication  [Restart]   Is this medication appropriate for the patient? Yes   Is this medication effective? Not yet started          Refill Questions - Documented Responses      Flowsheet Row Most Recent Value   Patient Availability and HIPAA Verification    Does patient want to proceed with activity? Yes   HIPAA/medical authority confirmed? Yes   Relationship to patient of person spoken to? Self   Refill Screening Questions    When does the patient need to receive the medication? 10/20/22   Refill Delivery Questions    How will the patient receive the medication? Mail   When does the patient need to receive the  "medication? 10/20/22   Shipping Address Home   Address in [facility] confirmed and updated if neccessary? Yes   Expected Copay ($) 0   Is the patient able to afford the medication copay? Yes   Payment Method zero copay   Days supply of Refill 21   Supplies needed? No supplies needed   Refill activity completed? Yes   Refill activity plan Refill scheduled   Shipment/Pickup Date: 10/17/22            Objective    She has a past medical history of Encounter for blood transfusion and Hypertension.    Tried/failed medications: Restart    BP Readings from Last 4 Encounters:   09/29/22 105/71   09/08/22 106/60   09/06/22 132/77   08/23/22 112/75     Ht Readings from Last 4 Encounters:   09/29/22 5' 1" (1.549 m)   09/08/22 5' 1" (1.549 m)   09/06/22 5' 1" (1.549 m)   08/23/22 5' 1" (1.549 m)     Wt Readings from Last 4 Encounters:   09/29/22 62.1 kg (137 lb)   09/08/22 65.3 kg (144 lb)   09/06/22 66.7 kg (147 lb)   08/23/22 66.7 kg (147 lb)     Recent Labs   Lab Result Units 08/04/22  0859   Hemoglobin g/dL 8.9 L   Hematocrit % 28.9 L   WBC 10*3/uL 5.1   Sodium mmol/L 143   Potassium mmol/L 4.6   Chloride mmol/L 104   Glucose mg/dL 94   BUN mg/dL 10   Creatinine mg/dL 0.61   Calcium mg/dL 9.7   Total Protein g/dL 7.2   Albumin g/dL 3.5   Total Bilirubin mg/dL 0.2   Alkaline Phosphatase U/L 72   AST U/L 23   ALT U/L 26     The goals of cancer treatment include:  Achieving remission of cancer, if possible  Reducing tumor size and spread of cancer, if remission is not possible  Minimizing pain and symptoms of the cancer  Preventing infection and other complications of treatment  Promoting adequate nutrition  Encouraging proper hydration  Improving or maintaining quality of life  Maintaining optimal therapy adherence  Minimizing and managing side effects    Goals of Therapy Status: Discussed (new start) (Restart)    Assessment/Plan  Patient plans to start therapy on 10/20/22      Indication, dosage, appropriateness, " effectiveness, safety and convenience of her specialty medication(s) were reviewed today.     Patient Education   Pharmacist offer to  patient was declined. Printed educational materials will be provided with medication.  Patient did accept verbal education on the following topics:  · Expectations and possible outcomes of therapy  · Proper use, timely administration, and missed dose management  · Duration of therapy  · Side effects, including prevention, minimization, and management  · New or changed medications, including prescribe and over the counter medications and supplements  · Storage, safe handling, and disposal    Patient previously on this therapy.     Tasks added this encounter   11/3/2022 - Refill Call (Auto Added)  4/4/2023 - Clinical - Follow Up Assesement (180 day)   Tasks due within next 3 months   No tasks due.     Christofer Schulz, PharmD  [facility]  [address] [facility] [address]  Phone: [phone]  Fax: [phone]

## 2022-10-13 NOTE — TELEPHONE ENCOUNTER
Luz Maria - Outgoing Call. Returned Ms. Duenas's call. She has agreed to completing initial. However she will call [facility] back when she is home to complete the asssessment.

## 2022-10-14 ENCOUNTER — TELEPHONE (OUTPATIENT)
Dept: HEMATOLOGY/ONCOLOGY | Facility: CLINIC | Age: 62
End: 2022-10-14
Payer: MEDICAID

## 2022-10-14 ENCOUNTER — SPECIALTY PHARMACY (OUTPATIENT)
Dept: PHARMACY | Facility: CLINIC | Age: 62
End: 2022-10-14
Payer: MEDICAID

## 2022-10-14 NOTE — TELEPHONE ENCOUNTER
Incoming Call: Mrs. Jackson called to inform us of a new medication she was just prescribed.  She's been switched from Macrobid to Bactrim DS for UTI. Wanted to know if there was any DDI between the bactrim and the chemo medications. She was counseled and in agreement with plan.

## 2022-10-14 NOTE — TELEPHONE ENCOUNTER
----- Message from Ammy Vazquez MA sent at 10/13/2022  7:58 AM CDT -----  Contact: Patient    ----- Message -----  From: Jina Greene  Sent: 10/12/2022   4:37 PM CDT  To: Jon Armstrong Staff    Patient need Patti to call her    Patient need a call back today if possible       #  [phone]

## 2022-10-18 ENCOUNTER — SPECIALTY PHARMACY (OUTPATIENT)
Dept: PHARMACY | Facility: CLINIC | Age: 62
End: 2022-10-18
Payer: MEDICAID

## 2022-10-18 DIAGNOSIS — C20 RECTAL CANCER: Primary | ICD-10-CM

## 2022-10-18 NOTE — TELEPHONE ENCOUNTER
Incoming call: Mrs. Jackson called to inform me that she has just received the Xeloda. She will be reaching out to MDO to confirm her start date. She's currently on Bactrim which she will be completing soon for UTI. She takes the bactrim as prescribed but the prednisone she takes is once daily instead of BID. She was advised to follow the direction. Pt is in agreement with plan.

## 2022-10-19 ENCOUNTER — OFFICE VISIT (OUTPATIENT)
Dept: HEMATOLOGY/ONCOLOGY | Facility: CLINIC | Age: 62
End: 2022-10-19
Payer: MEDICAID

## 2022-10-19 ENCOUNTER — TELEPHONE (OUTPATIENT)
Dept: HEMATOLOGY/ONCOLOGY | Facility: CLINIC | Age: 62
End: 2022-10-19

## 2022-10-19 VITALS
SYSTOLIC BLOOD PRESSURE: 116 MMHG | WEIGHT: 137 LBS | OXYGEN SATURATION: 97 % | HEIGHT: 61 IN | TEMPERATURE: 98 F | HEART RATE: 69 BPM | DIASTOLIC BLOOD PRESSURE: 71 MMHG | RESPIRATION RATE: 15 BRPM | BODY MASS INDEX: 25.86 KG/M2

## 2022-10-19 DIAGNOSIS — C20 RECTAL CANCER: Primary | ICD-10-CM

## 2022-10-19 DIAGNOSIS — T45.1X5A CINV (CHEMOTHERAPY-INDUCED NAUSEA AND VOMITING): ICD-10-CM

## 2022-10-19 DIAGNOSIS — G89.3 CANCER RELATED PAIN: ICD-10-CM

## 2022-10-19 DIAGNOSIS — R11.2 CINV (CHEMOTHERAPY-INDUCED NAUSEA AND VOMITING): ICD-10-CM

## 2022-10-19 LAB
ALBUMIN SERPL BCP-MCNC: 3.3 G/DL (ref 3.4–5)
ALBUMIN/GLOBULIN RATIO: 0.79 RATIO (ref 1.1–1.8)
ALP SERPL-CCNC: 72 U/L (ref 46–116)
ALT SERPL W P-5'-P-CCNC: 16 U/L (ref 12–78)
ANION GAP SERPL CALC-SCNC: 7 MMOL/L (ref 3–11)
AST SERPL-CCNC: 18 U/L (ref 15–37)
BANDS: 1 % (ref 0–5)
BILIRUB SERPL-MCNC: 0.2 MG/DL (ref 0–1)
BUN SERPL-MCNC: 8 MG/DL (ref 7–18)
BUN/CREAT SERPL: 10.38 RATIO (ref 7–18)
CALCIUM SERPL-MCNC: 9.2 MG/DL (ref 8.8–10.5)
CELLS COUNTED: 100
CHLORIDE SERPL-SCNC: 103 MMOL/L (ref 100–108)
CO2 SERPL-SCNC: 28 MMOL/L (ref 21–32)
CREAT SERPL-MCNC: 0.77 MG/DL (ref 0.55–1.02)
EOSINOPHIL NFR BLD: 6 % (ref 1–3)
ERYTHROCYTE [DISTWIDTH] IN BLOOD BY AUTOMATED COUNT: 24.9 % (ref 12.5–18)
GFR ESTIMATION: > 60
GLOBULIN: 4.2 G/DL (ref 2.3–3.5)
GLUCOSE SERPL-MCNC: 83 MG/DL (ref 70–110)
HCT VFR BLD AUTO: 36.4 % (ref 37–47)
HGB BLD-MCNC: 11.6 G/DL (ref 12–16)
LYMPHOCYTES NFR BLD: 22 % (ref 25–40)
MCH RBC QN AUTO: 26.1 PG (ref 27–31.2)
MCHC RBC AUTO-ENTMCNC: 31.9 G/DL (ref 31.8–35.4)
MCV RBC AUTO: 81.8 FL (ref 80–97)
MONOCYTES NFR BLD: 13 % (ref 1–15)
NEUTROPHILS # BLD AUTO: 2.6 10*3/UL (ref 1.8–7.7)
NEUTROPHILS NFR BLD: 58 % (ref 37–80)
NUCLEATED RED BLOOD CELLS: 0 %
PLATELETS: 414 10*3/UL (ref 142–424)
POTASSIUM SERPL-SCNC: 4.3 MMOL/L (ref 3.6–5.2)
PROT SERPL-MCNC: 7.5 G/DL (ref 6.4–8.2)
RBC # BLD AUTO: 4.45 10*6/UL (ref 4.2–5.4)
RBC MORPH BLD: ABNORMAL
SMALL PLATELETS BLD QL SMEAR: ADEQUATE
SODIUM BLD-SCNC: 138 MMOL/L (ref 135–145)
WBC # BLD: 4.4 10*3/UL (ref 4.6–10.2)

## 2022-10-19 PROCEDURE — 1160F RVW MEDS BY RX/DR IN RCRD: CPT | Mod: CPTII,S$GLB,, | Performed by: NURSE PRACTITIONER

## 2022-10-19 PROCEDURE — 1160F PR REVIEW ALL MEDS BY PRESCRIBER/CLIN PHARMACIST DOCUMENTED: ICD-10-PCS | Mod: CPTII,S$GLB,, | Performed by: NURSE PRACTITIONER

## 2022-10-19 PROCEDURE — 3074F PR MOST RECENT SYSTOLIC BLOOD PRESSURE < 130 MM HG: ICD-10-PCS | Mod: CPTII,S$GLB,, | Performed by: NURSE PRACTITIONER

## 2022-10-19 PROCEDURE — 1159F MED LIST DOCD IN RCRD: CPT | Mod: CPTII,S$GLB,, | Performed by: NURSE PRACTITIONER

## 2022-10-19 PROCEDURE — 1159F PR MEDICATION LIST DOCUMENTED IN MEDICAL RECORD: ICD-10-PCS | Mod: CPTII,S$GLB,, | Performed by: NURSE PRACTITIONER

## 2022-10-19 PROCEDURE — 99215 PR OFFICE/OUTPT VISIT, EST, LEVL V, 40-54 MIN: ICD-10-PCS | Mod: S$GLB,,, | Performed by: NURSE PRACTITIONER

## 2022-10-19 PROCEDURE — 3078F PR MOST RECENT DIASTOLIC BLOOD PRESSURE < 80 MM HG: ICD-10-PCS | Mod: CPTII,S$GLB,, | Performed by: NURSE PRACTITIONER

## 2022-10-19 PROCEDURE — 99215 OFFICE O/P EST HI 40 MIN: CPT | Mod: S$GLB,,, | Performed by: NURSE PRACTITIONER

## 2022-10-19 PROCEDURE — 3078F DIAST BP <80 MM HG: CPT | Mod: CPTII,S$GLB,, | Performed by: NURSE PRACTITIONER

## 2022-10-19 PROCEDURE — 3074F SYST BP LT 130 MM HG: CPT | Mod: CPTII,S$GLB,, | Performed by: NURSE PRACTITIONER

## 2022-10-19 RX ORDER — ONDANSETRON 8 MG/1
8 TABLET, ORALLY DISINTEGRATING ORAL EVERY 6 HOURS PRN
Qty: 30 TABLET | Refills: 3 | Status: SHIPPED | OUTPATIENT
Start: 2022-10-19 | End: 2023-01-03 | Stop reason: SDUPTHER

## 2022-10-19 RX ORDER — PROMETHAZINE HYDROCHLORIDE 25 MG/1
25 TABLET ORAL EVERY 8 HOURS
Qty: 30 TABLET | Refills: 3 | Status: SHIPPED | OUTPATIENT
Start: 2022-10-19 | End: 2022-12-01

## 2022-10-19 NOTE — PROGRESS NOTES
MEDICAL ONCOLOGY FOLLOW UP CONSULTATION NOTE    Patient ID: Yulissa Munguia is a 62 y.o. female.    Chief Complaint: Rectal cancer    HPI:  Patient is a 61-year-old female who was previously diagnosed with rectal cancer about a year back and was seen and evaluated by [facility] group with recommendations for chemo XRT for for presumed rectal cancer stage III A. Patient however was reluctant to get any treatment and did not pursue any medical care since then.  She now was referred by her primary care provider after patient was dismissed from [facility].  She presents to our clinic today with her daughters for further evaluation.  She still does not seem to be interested in pursuing chemo or radiation options but would like to have a discussion about her prognosis.  She also reports having a rectal tube placed recently in [place] but I have no records to suggest that it was done recently and for what indication.  She continues to smoke every day and does not seem to actually believe in her cancer diagnosis.      Social History     Socioeconomic History    Marital status:    Tobacco Use    Smoking status: Every Day     Years: 20.00     Types: Cigarettes    Smokeless tobacco: Never    Tobacco comments:     would be interested in smoking cessation in the future   Substance and Sexual Activity    Alcohol use: Not Currently    Drug use: Not Currently     Past Surgical History:   Procedure Laterality Date    ADENOIDECTOMY       SECTION      TONSILLECTOMY           Review of systems:  Review of Systems   Constitutional:  Positive for activity change and appetite change. Negative for chills, diaphoresis, fatigue and unexpected weight change.   HENT:  Negative for congestion, facial swelling, hearing loss, mouth sores, trouble swallowing and voice change.    Eyes:  Negative for photophobia, pain, discharge and itching.   Respiratory:  Negative for apnea, cough, choking, chest tightness  and shortness of breath.    Cardiovascular:  Negative for chest pain, palpitations and leg swelling.   Gastrointestinal:  Positive for rectal pain. Negative for abdominal distention, abdominal pain, anal bleeding and blood in stool.   Endocrine: Negative for cold intolerance, heat intolerance, polydipsia and polyphagia.   Genitourinary:  Negative for difficulty urinating, dysuria, flank pain and hematuria.   Musculoskeletal:  Negative for arthralgias, back pain, joint swelling, myalgias, neck pain and neck stiffness.   Skin:  Negative for color change, pallor and wound.   Allergic/Immunologic: Negative for environmental allergies, food allergies and immunocompromised state.   Neurological:  Negative for dizziness, seizures, facial asymmetry, speech difficulty, light-headedness, numbness and headaches.   Hematological:  Negative for adenopathy. Does not bruise/bleed easily.   Psychiatric/Behavioral:  Negative for agitation, behavioral problems, confusion, decreased concentration and sleep disturbance.            Physical Exam  Vitals and nursing note reviewed.   Constitutional:       General: She is not in acute distress.     Appearance: Normal appearance. She is not ill-appearing.   HENT:      Head: Normocephalic and atraumatic.      Nose: No congestion or rhinorrhea.   Eyes:      General: No scleral icterus.     Extraocular Movements: Extraocular movements intact.      Pupils: Pupils are equal, round, and reactive to light.   Cardiovascular:      Rate and Rhythm: Normal rate and regular rhythm.      Pulses: Normal pulses.      Heart sounds: Normal heart sounds. No murmur heard.    No gallop.   Pulmonary:      Effort: Pulmonary effort is normal. No respiratory distress.      Breath sounds: Normal breath sounds. No stridor. No wheezing or rhonchi.   Abdominal:      General: Bowel sounds are normal. There is no distension.      Palpations: There is no mass.      Tenderness: There is no abdominal tenderness. There is  no guarding.   Musculoskeletal:         General: No swelling, tenderness, deformity or signs of injury. Normal range of motion.      Cervical back: Normal range of motion and neck supple. No rigidity. No muscular tenderness.      Right lower leg: No edema.      Left lower leg: No edema.   Skin:     General: Skin is warm.      Coloration: Skin is not jaundiced or pale.      Findings: No bruising or lesion.   Neurological:      General: No focal deficit present.      Mental Status: She is alert and oriented to person, place, and time.      Cranial Nerves: No cranial nerve deficit.      Sensory: No sensory deficit.      Motor: No weakness.      Gait: Gait normal.   Psychiatric:         Mood and Affect: Mood normal.         Behavior: Behavior normal.         Thought Content: Thought content normal.     Vitals:    10/19/22 0939   BP: 116/71   Pulse: 69   Resp: 15   Temp: 98 °F (36.7 °C)   Body surface area is 1.63 meters squared.    IMAGING:          Assessment/Plan:      ECOG 1     1. Rectal Cancer:    == Initially diagnosed in July 2021 as stage IIIA rectal cancer.  She has not followed and not pursued any treatment since then as she is skeptical about chemotherapy and side effects of radiation.  She believes an alternate treatment options which she has likely been doing since that time.  After recent dismissal from [facility] she presents to us for further discussion of treatment and management options.  == I discussed with her that restaging scan would be needed to accurately determine her current cancer stage and based on that would recommend treatment with either palliative or curative intent.  She has neglected rectal cancer over the last 1 year at this time and I am strongly suspecting metastasis is with stage IV disease.  I discussed with her that this is an Oncology Clinic and we would only talk about standard treatment with chemotherapy plus-minus radiation options.  Patient is more interested  interested in alternative treatment discussions and I discussed with her that this is not something we do in this clinic.  She voiced understanding but would like to get her PET scan done and then return to clinic for further discussion.  She is not currently agreeable to chemotherapy but would like to discuss this further once PET scan results are back.  == 7/14/22:  Patient underwent PET scan 07/08/2022 which showed continued thickening of the rectal wall extending into the anal region with increased radiotracer uptake.  This continues to extend over about 6 cm in length.  Increased radiotracer uptake remains present in the area with an SUV of approximately 22.5 compared 24.4 on the previous examination.  Indistinctness of the fat planes posteriorly remain present consistent with extra colonic extension.  There continues to be hypermetabolic lymph nodes in the inguinal region bilaterally short axis diameter is on the order of about 12 mm are present with SUVs on the order of approximately 3.4.  A mildly hypermetabolic lymph node remains present along the left sidewall of the pelvis having short axis diameter of approximately 7 mm today versus 10 mm on the previous examination.  The SUV in this area is approximately 2.1 versus 5.8 on previous exam. Considering her original diagnosis as Stage IIIA with outside oncologist, this PET scan shows it is likely, T4a with extracolonic extension and N1b with 2-3 regional LN involvement. I discussed with her about chemo-XRT  and would like to talk to radiation oncology first before she decides to pursue any treatment. I will hence make referral to Rad-onc in this regard  ==07/21/2022: Patient reports that she would like to pursue chemoradiaton. She has appointment today for radiation evaluation with Dr. Contreras. I discussed with Dr. Herndon and if she is candidate for radiation plan is for concurrent XRT with 5-fu. She will need mediport placement. Referral placed.    ==07/28/2022:  Pt here today to discuss upcoming chemotherapy, side effect profile, risk versus benefits, and expected outcomes have been discussed today. All questions and concerns answered and consents have been signed.After discussion with XRT and Dr. Herndon plan is to proceed with concurrent XRT and Xeloda. Xeloda dosage calculated with only 500mg tablets due to patient getting very confused with medications and having difficulty repeating proper dosage back after multiple attempts at teaching. She is poor candidate receive combination of 500mg and 150mg and will likely confuse them and take improper dose. She also reports taking too many to name or remember herbs and supplements which are not on medication list. I instructed her to contact clinic with list to ensure no interactions.   == 8/9/22: tolerating chemo/xrt well, states she is feeling much better since starting treatment. She does have some rectal pressure and pain, taking norco 1-2 times day   == 8/23/22: compliant with chemo/xrt, continues with rectal pain, refill of norco provided today. Labs reviewed from last week, show mild anemia.  == 9/8/22:  Pain better controlled with [place] 10/0325 patient continues to take medication 3 times a day.  Reports radiation to complete on Thursday patient is aware she will discontinue Xeloda after Thursday.  Labs reviewed and patient will return to clinic in 3 weeks to evaluate pain management  == 9/29/22: completed XRT/xeloda on 9/13/22, continues with constipation using miralax daily. Based on NCCN Guidelines we will proceed with Capox for 12-16 weeks. She will need a port placed prior to beginning.  Discussed with Dr Herndon   == 10/19/22:  Patient and family here today to discuss upcoming chemotherapy, side effect profile, risk versus benefits, and expected outcomes have been discussed today. All questions and concerns answered and consents have been signed.     2. Pain Management:    == I discussed with her  that we have a very strict policy for pain medications and she will need to sign a pain contract. If she decides to pursue treatment for her rectal cancer then she can be started on pain medications. XRT could also help in this regard  == Norco 5/325 mg PO BID prn is what she seems to be taking at this time, after her last prescription from her ER visit.  == 9/6/22: patient walked in to clinic today stating her pain is 10/10 and she is out of pain medications. Discussed with Dr Contreras and he recommends increasing dosage due to skin excoriation related to rectal xrt, will increase to Norco 10/325 TID and will assess at her visit on Thursday.     3. Constipation  Advised to take miralax and senokot   Limit opioid usage      Plan:  Port placement next week  Plan to start xelox on 11/1/2022  RTC on 11/1/22 with cbc cmp Q3w          Total time spent in counseling and discussion about further management options including relevant lab work, treatment,  prognosis, medications and intended side effects was more than 60 minutes. More than 50% of the time was spent on counseling and coordination of care.  This includes face to face time and non-face to face time preparing to see the patient (eg, review of tests), Obtaining and/or reviewing separately obtained history, Documenting clinical information in the electronic or other health record, Independently interpreting resultsand communicating results to the patient/family/caregiver, or Care coordination.

## 2022-10-20 ENCOUNTER — OFFICE VISIT (OUTPATIENT)
Dept: SURGERY | Facility: CLINIC | Age: 62
End: 2022-10-20
Payer: MEDICAID

## 2022-10-20 ENCOUNTER — TELEPHONE (OUTPATIENT)
Dept: HEMATOLOGY/ONCOLOGY | Facility: CLINIC | Age: 62
End: 2022-10-20
Payer: MEDICAID

## 2022-10-20 DIAGNOSIS — C20 RECTAL CANCER: ICD-10-CM

## 2022-10-20 PROCEDURE — 1159F MED LIST DOCD IN RCRD: CPT | Mod: CPTII,S$GLB,, | Performed by: SURGERY

## 2022-10-20 PROCEDURE — 1160F RVW MEDS BY RX/DR IN RCRD: CPT | Mod: CPTII,S$GLB,, | Performed by: SURGERY

## 2022-10-20 PROCEDURE — 99203 OFFICE O/P NEW LOW 30 MIN: CPT | Mod: S$GLB,,, | Performed by: SURGERY

## 2022-10-20 PROCEDURE — 1159F PR MEDICATION LIST DOCUMENTED IN MEDICAL RECORD: ICD-10-PCS | Mod: CPTII,S$GLB,, | Performed by: SURGERY

## 2022-10-20 PROCEDURE — 99203 PR OFFICE/OUTPT VISIT, NEW, LEVL III, 30-44 MIN: ICD-10-PCS | Mod: S$GLB,,, | Performed by: SURGERY

## 2022-10-20 PROCEDURE — 1160F PR REVIEW ALL MEDS BY PRESCRIBER/CLIN PHARMACIST DOCUMENTED: ICD-10-PCS | Mod: CPTII,S$GLB,, | Performed by: SURGERY

## 2022-10-20 NOTE — TELEPHONE ENCOUNTER
"Patient having an MRI on 11/3/22 requesting medication for claustrophobia. She also states she is having yeast "all on her bottom areas". Message sent to provider. BETHEL   "

## 2022-10-20 NOTE — PROGRESS NOTES
Subjective:       Patient ID: Yulissa Munguia is a 62 y.o. female.    Chief Complaint: Other (Port )    Ms. Munguia is referred by Medical Oncology for venous port insertion.  She is diagnosed with a locally advanced rectal cancer in 2021.  She only recently agreed to treatment and has undergone a course of radiation with oral Xeloda.  She is scheduled to start IV oxaliplatin and resume oral Xeloda on 2022.  She needs a port placed prior to that time.    Past Medical History:   Diagnosis Date    Encounter for blood transfusion     Hypertension      Past Surgical History:   Procedure Laterality Date    ADENOIDECTOMY       SECTION      TONSILLECTOMY       Family History   Problem Relation Age of Onset    Esophageal cancer Mother     Hypertension Mother     Colon cancer Father      Social History     Socioeconomic History    Marital status:    Tobacco Use    Smoking status: Every Day     Years: 20.00     Types: Cigarettes    Smokeless tobacco: Never    Tobacco comments:     would be interested in smoking cessation in the future   Substance and Sexual Activity    Alcohol use: Not Currently    Drug use: Not Currently       Current Outpatient Medications   Medication Sig Dispense Refill    aspirin 81 MG Chew Take 81 mg by mouth.      [START ON 10/24/2022] capecitabine (XELODA) 500 MG Tab Take 3 tablets (1,500 mg total) by mouth 2 (two) times daily Take on days 1-14 of 21 day chemotherapy cycle. 84 tablet 6    ferrous sulfate 324 mg (65 mg iron) TbEC       HYDROcodone-acetaminophen (NORCO)  mg per tablet Take 1 tablet by mouth every 8 (eight) hours as needed for Pain. 90 tablet 0    multivitamin capsule       nitrofurantoin, macrocrystal-monohydrate, (MACROBID) 100 MG capsule Take 100 mg by mouth every 12 (twelve) hours.      nitroGLYCERIN (NITROSTAT) 0.4 MG SL tablet Place 0.4 mg under the tongue.      ondansetron (ZOFRAN) 8 MG tablet Take 1 tablet (8 mg total) by mouth every 8  (eight) hours as needed for Nausea. 30 tablet 2    ondansetron (ZOFRAN-ODT) 8 MG TbDL Take 1 tablet (8 mg total) by mouth every 6 (six) hours as needed. 30 tablet 3    oxyCODONE-acetaminophen (PERCOCET)  mg per tablet Take 1 tablet by mouth every 4 (four) hours as needed.      promethazine (PHENERGAN) 25 MG tablet Take 1 tablet (25 mg total) by mouth every 8 (eight) hours. 30 tablet 3    turmeric 400 mg Cap Take by mouth.       No current facility-administered medications for this visit.     Review of patient's allergies indicates:  No Known Allergies    Review of Systems   Constitutional: Negative.    Respiratory: Negative.     Cardiovascular: Negative.    Gastrointestinal:  Positive for constipation and rectal pain.   Musculoskeletal: Negative.    Skin: Negative.    Hematological:  Bruises/bleeds easily.     Objective:      There were no vitals filed for this visit.  Physical Exam  Constitutional:       General: She is not in acute distress.  Cardiovascular:      Rate and Rhythm: Normal rate and regular rhythm.      Heart sounds: Normal heart sounds.   Pulmonary:      Effort: Pulmonary effort is normal.      Breath sounds: Normal breath sounds.   Abdominal:      General: Abdomen is flat. Bowel sounds are normal. There is no distension.      Palpations: Abdomen is soft. There is no mass.      Tenderness: There is no abdominal tenderness.   Musculoskeletal:         General: No deformity. Normal range of motion.   Skin:     General: Skin is warm and dry.   Neurological:      General: No focal deficit present.      Mental Status: She is alert and oriented to person, place, and time.        Assessment:       1. Rectal cancer          Plan:       Venous port insertion was discussed with the patient, including the risks of bleeding, infection, pneumothorax, and arterial injury.  She agrees to proceed.  The procedure will be scheduled for 10/31/2022.

## 2022-10-21 DIAGNOSIS — C20 RECTAL CANCER: ICD-10-CM

## 2022-10-21 DIAGNOSIS — B37.9 CANDIDA INFECTION: Primary | ICD-10-CM

## 2022-10-21 RX ORDER — ALPRAZOLAM 0.25 MG/1
TABLET ORAL
Qty: 2 TABLET | Refills: 0 | Status: SHIPPED | OUTPATIENT
Start: 2022-10-21 | End: 2023-05-23

## 2022-10-21 RX ORDER — FLUCONAZOLE 200 MG/1
200 TABLET ORAL DAILY
Qty: 30 TABLET | Refills: 0 | Status: SHIPPED | OUTPATIENT
Start: 2022-10-21 | End: 2022-11-20

## 2022-10-27 ENCOUNTER — TELEPHONE (OUTPATIENT)
Dept: HEMATOLOGY/ONCOLOGY | Facility: CLINIC | Age: 62
End: 2022-10-27

## 2022-10-27 NOTE — TELEPHONE ENCOUNTER
Patient to have port placed 10/31, MRI 11/3, will move treatment start date to 11/8 and followup with Dr. Herndon to 11/7

## 2022-10-31 ENCOUNTER — OUTSIDE PLACE OF SERVICE (OUTPATIENT)
Dept: ADMINISTRATIVE | Facility: OTHER | Age: 62
End: 2022-10-31
Payer: MEDICAID

## 2022-10-31 DIAGNOSIS — C20 RECTAL CANCER: Primary | ICD-10-CM

## 2022-10-31 PROCEDURE — 77001 FLUOROGUIDE FOR VEIN DEVICE: CPT | Mod: 26,,, | Performed by: SURGERY

## 2022-10-31 PROCEDURE — 36561 PR INSERT TUNNELED CV CATH WITH PORT: ICD-10-PCS | Mod: LT,,, | Performed by: SURGERY

## 2022-10-31 PROCEDURE — 36561 INSERT TUNNELED CV CATH: CPT | Mod: LT,,, | Performed by: SURGERY

## 2022-10-31 PROCEDURE — 77001 CHG FLUOROGUIDE CNTRL VEN ACCESS,PLACE,REPLACE,REMOVE: ICD-10-PCS | Mod: 26,,, | Performed by: SURGERY

## 2022-11-01 DIAGNOSIS — N30.01 ACUTE CYSTITIS WITH HEMATURIA: Primary | ICD-10-CM

## 2022-11-01 RX ORDER — NITROFURANTOIN 25; 75 MG/1; MG/1
100 CAPSULE ORAL 2 TIMES DAILY
Qty: 14 CAPSULE | Refills: 0 | Status: SHIPPED | OUTPATIENT
Start: 2022-11-01 | End: 2022-11-08

## 2022-11-04 ENCOUNTER — SPECIALTY PHARMACY (OUTPATIENT)
Dept: PHARMACY | Facility: CLINIC | Age: 62
End: 2022-11-04
Payer: MEDICAID

## 2022-11-04 LAB
ALBUMIN SERPL BCP-MCNC: 3.1 G/DL (ref 3.4–5)
ALBUMIN/GLOBULIN RATIO: 0.78 RATIO (ref 1.1–1.8)
ALP SERPL-CCNC: 63 U/L (ref 46–116)
ALT SERPL W P-5'-P-CCNC: 18 U/L (ref 12–78)
ANION GAP SERPL CALC-SCNC: 3 MMOL/L (ref 3–11)
ANISOCYTOSIS: NORMAL
AST SERPL-CCNC: 18 U/L (ref 15–37)
BASOPHILS NFR BLD: 0.4 % (ref 0–3)
BILIRUB SERPL-MCNC: 0.2 MG/DL (ref 0–1)
BUN SERPL-MCNC: 7 MG/DL (ref 7–18)
BUN/CREAT SERPL: 12.72 RATIO (ref 7–18)
CALCIUM SERPL-MCNC: 9.2 MG/DL (ref 8.8–10.5)
CHLORIDE SERPL-SCNC: 104 MMOL/L (ref 100–108)
CO2 SERPL-SCNC: 34 MMOL/L (ref 21–32)
CREAT SERPL-MCNC: 0.55 MG/DL (ref 0.55–1.02)
EOSINOPHIL NFR BLD: 5.4 % (ref 1–3)
ERYTHROCYTE [DISTWIDTH] IN BLOOD BY AUTOMATED COUNT: 22.2 % (ref 12.5–18)
GFR ESTIMATION: > 60
GLOBULIN: 4 G/DL (ref 2.3–3.5)
GLUCOSE SERPL-MCNC: 80 MG/DL (ref 70–110)
HCT VFR BLD AUTO: 33.6 % (ref 37–47)
HGB BLD-MCNC: 10.7 G/DL (ref 12–16)
LYMPHOCYTES NFR BLD: 13.2 % (ref 25–40)
MCH RBC QN AUTO: 27.1 PG (ref 27–31.2)
MCHC RBC AUTO-ENTMCNC: 31.8 G/DL (ref 31.8–35.4)
MCV RBC AUTO: 85.1 FL (ref 80–97)
MONOCYTES NFR BLD: 14 % (ref 1–15)
NEUTROPHILS # BLD AUTO: 3.65 10*3/UL (ref 1.8–7.7)
NEUTROPHILS NFR BLD: 66.3 % (ref 37–80)
NUCLEATED RED BLOOD CELLS: 0 %
PLATELETS: 354 10*3/UL (ref 142–424)
POTASSIUM SERPL-SCNC: 4.1 MMOL/L (ref 3.6–5.2)
PROT SERPL-MCNC: 7.1 G/DL (ref 6.4–8.2)
RBC # BLD AUTO: 3.95 10*6/UL (ref 4.2–5.4)
SODIUM BLD-SCNC: 141 MMOL/L (ref 135–145)
WBC # BLD: 5.5 10*3/UL (ref 4.6–10.2)

## 2022-11-04 NOTE — TELEPHONE ENCOUNTER
Outgoing call. Per patient she is not sure when next cycle begins, and she has a whole cycle on hand. Will follow-up.

## 2022-11-07 ENCOUNTER — OFFICE VISIT (OUTPATIENT)
Dept: HEMATOLOGY/ONCOLOGY | Facility: CLINIC | Age: 62
End: 2022-11-07
Payer: MEDICAID

## 2022-11-07 VITALS
HEART RATE: 91 BPM | RESPIRATION RATE: 18 BRPM | SYSTOLIC BLOOD PRESSURE: 129 MMHG | WEIGHT: 141 LBS | DIASTOLIC BLOOD PRESSURE: 74 MMHG | BODY MASS INDEX: 26.62 KG/M2 | TEMPERATURE: 98 F | HEIGHT: 61 IN | OXYGEN SATURATION: 98 %

## 2022-11-07 DIAGNOSIS — G89.3 CANCER RELATED PAIN: ICD-10-CM

## 2022-11-07 PROCEDURE — 99215 PR OFFICE/OUTPT VISIT, EST, LEVL V, 40-54 MIN: ICD-10-PCS | Mod: S$GLB,,, | Performed by: INTERNAL MEDICINE

## 2022-11-07 PROCEDURE — 3008F PR BODY MASS INDEX (BMI) DOCUMENTED: ICD-10-PCS | Mod: CPTII,S$GLB,, | Performed by: INTERNAL MEDICINE

## 2022-11-07 PROCEDURE — 3078F DIAST BP <80 MM HG: CPT | Mod: CPTII,S$GLB,, | Performed by: INTERNAL MEDICINE

## 2022-11-07 PROCEDURE — 3008F BODY MASS INDEX DOCD: CPT | Mod: CPTII,S$GLB,, | Performed by: INTERNAL MEDICINE

## 2022-11-07 PROCEDURE — 3074F PR MOST RECENT SYSTOLIC BLOOD PRESSURE < 130 MM HG: ICD-10-PCS | Mod: CPTII,S$GLB,, | Performed by: INTERNAL MEDICINE

## 2022-11-07 PROCEDURE — 1159F MED LIST DOCD IN RCRD: CPT | Mod: CPTII,S$GLB,, | Performed by: INTERNAL MEDICINE

## 2022-11-07 PROCEDURE — 3078F PR MOST RECENT DIASTOLIC BLOOD PRESSURE < 80 MM HG: ICD-10-PCS | Mod: CPTII,S$GLB,, | Performed by: INTERNAL MEDICINE

## 2022-11-07 PROCEDURE — 1159F PR MEDICATION LIST DOCUMENTED IN MEDICAL RECORD: ICD-10-PCS | Mod: CPTII,S$GLB,, | Performed by: INTERNAL MEDICINE

## 2022-11-07 PROCEDURE — 99215 OFFICE O/P EST HI 40 MIN: CPT | Mod: S$GLB,,, | Performed by: INTERNAL MEDICINE

## 2022-11-07 PROCEDURE — 3074F SYST BP LT 130 MM HG: CPT | Mod: CPTII,S$GLB,, | Performed by: INTERNAL MEDICINE

## 2022-11-07 RX ORDER — SODIUM CHLORIDE 0.9 % (FLUSH) 0.9 %
10 SYRINGE (ML) INJECTION
Status: CANCELLED | OUTPATIENT
Start: 2022-11-08

## 2022-11-07 RX ORDER — DIPHENHYDRAMINE HYDROCHLORIDE 50 MG/ML
50 INJECTION INTRAMUSCULAR; INTRAVENOUS ONCE AS NEEDED
Status: CANCELLED | OUTPATIENT
Start: 2022-11-08

## 2022-11-07 RX ORDER — EPINEPHRINE 0.3 MG/.3ML
0.3 INJECTION SUBCUTANEOUS ONCE AS NEEDED
Status: CANCELLED | OUTPATIENT
Start: 2022-11-08

## 2022-11-07 RX ORDER — HEPARIN 100 UNIT/ML
500 SYRINGE INTRAVENOUS
Status: CANCELLED | OUTPATIENT
Start: 2022-11-08

## 2022-11-07 RX ORDER — HYDROCODONE BITARTRATE AND ACETAMINOPHEN 10; 325 MG/1; MG/1
1 TABLET ORAL EVERY 8 HOURS PRN
Qty: 90 TABLET | Refills: 0 | Status: SHIPPED | OUTPATIENT
Start: 2022-11-07 | End: 2022-12-05 | Stop reason: SDUPTHER

## 2022-11-07 NOTE — PROGRESS NOTES
MEDICAL ONCOLOGY FOLLOW UP CONSULTATION NOTE    Patient ID: Yulissa Munguia is a 62 y.o. female.    Chief Complaint: Rectal cancer    HPI:  Patient is a 61-year-old female who was previously diagnosed with rectal cancer about a year back and was seen and evaluated by [facility] group with recommendations for chemo XRT for for presumed rectal cancer stage III A. Patient however was reluctant to get any treatment and did not pursue any medical care since then.  She now was referred by her primary care provider after patient was dismissed from [facility].  She presents to our clinic today with her daughters for further evaluation.  She still does not seem to be interested in pursuing chemo or radiation options but would like to have a discussion about her prognosis.  She also reports having a rectal tube placed recently in [place] but I have no records to suggest that it was done recently and for what indication.  She continues to smoke every day and does not seem to actually believe in her cancer diagnosis.      Social History     Socioeconomic History    Marital status:    Tobacco Use    Smoking status: Every Day     Years: 20.00     Types: Cigarettes    Smokeless tobacco: Never    Tobacco comments:     would be interested in smoking cessation in the future   Substance and Sexual Activity    Alcohol use: Not Currently    Drug use: Not Currently     Past Surgical History:   Procedure Laterality Date    ADENOIDECTOMY       SECTION      TONSILLECTOMY           Review of systems:  Review of Systems   Constitutional:  Positive for activity change and appetite change. Negative for chills, diaphoresis, fatigue and unexpected weight change.   HENT:  Negative for congestion, facial swelling, hearing loss, mouth sores, trouble swallowing and voice change.    Eyes:  Negative for photophobia, pain, discharge and itching.   Respiratory:  Negative for apnea, cough, choking, chest tightness  and shortness of breath.    Cardiovascular:  Negative for chest pain, palpitations and leg swelling.   Gastrointestinal:  Positive for rectal pain. Negative for abdominal distention, abdominal pain, anal bleeding and blood in stool.   Endocrine: Negative for cold intolerance, heat intolerance, polydipsia and polyphagia.   Genitourinary:  Negative for difficulty urinating, dysuria, flank pain and hematuria.   Musculoskeletal:  Negative for arthralgias, back pain, joint swelling, myalgias, neck pain and neck stiffness.   Skin:  Negative for color change, pallor and wound.   Allergic/Immunologic: Negative for environmental allergies, food allergies and immunocompromised state.   Neurological:  Negative for dizziness, seizures, facial asymmetry, speech difficulty, light-headedness, numbness and headaches.   Hematological:  Negative for adenopathy. Does not bruise/bleed easily.   Psychiatric/Behavioral:  Negative for agitation, behavioral problems, confusion, decreased concentration and sleep disturbance.            Physical Exam  Vitals and nursing note reviewed.   Constitutional:       General: She is not in acute distress.     Appearance: Normal appearance. She is not ill-appearing.   HENT:      Head: Normocephalic and atraumatic.      Nose: No congestion or rhinorrhea.   Eyes:      General: No scleral icterus.     Extraocular Movements: Extraocular movements intact.      Pupils: Pupils are equal, round, and reactive to light.   Cardiovascular:      Rate and Rhythm: Normal rate and regular rhythm.      Pulses: Normal pulses.      Heart sounds: Normal heart sounds. No murmur heard.    No gallop.   Pulmonary:      Effort: Pulmonary effort is normal. No respiratory distress.      Breath sounds: Normal breath sounds. No stridor. No wheezing or rhonchi.   Abdominal:      General: Bowel sounds are normal. There is no distension.      Palpations: There is no mass.      Tenderness: There is no abdominal tenderness. There is  no guarding.   Musculoskeletal:         General: No swelling, tenderness, deformity or signs of injury. Normal range of motion.      Cervical back: Normal range of motion and neck supple. No rigidity. No muscular tenderness.      Right lower leg: No edema.      Left lower leg: No edema.   Skin:     General: Skin is warm.      Coloration: Skin is not jaundiced or pale.      Findings: No bruising or lesion.   Neurological:      General: No focal deficit present.      Mental Status: She is alert and oriented to person, place, and time.      Cranial Nerves: No cranial nerve deficit.      Sensory: No sensory deficit.      Motor: No weakness.      Gait: Gait normal.   Psychiatric:         Mood and Affect: Mood normal.         Behavior: Behavior normal.         Thought Content: Thought content normal.     Vitals:    11/07/22 0824   BP: 129/74   Pulse: 91   Resp: 18   Temp: 97.8 °F (36.6 °C)   Body surface area is 1.66 meters squared.    IMAGING:          Assessment/Plan:      ECOG 1     1. Rectal Cancer:    == Initially diagnosed in July 2021 as stage IIIA rectal cancer.  She has not followed and not pursued any treatment since then as she is skeptical about chemotherapy and side effects of radiation.  She believes an alternate treatment options which she has likely been doing since that time.  After recent dismissal from [facility] she presents to us for further discussion of treatment and management options.  == I discussed with her that restaging scan would be needed to accurately determine her current cancer stage and based on that would recommend treatment with either palliative or curative intent.  She has neglected rectal cancer over the last 1 year at this time and I am strongly suspecting metastasis is with stage IV disease.  I discussed with her that this is an Oncology Clinic and we would only talk about standard treatment with chemotherapy plus-minus radiation options.  Patient is more interested  interested in alternative treatment discussions and I discussed with her that this is not something we do in this clinic.  She voiced understanding but would like to get her PET scan done and then return to clinic for further discussion.  She is not currently agreeable to chemotherapy but would like to discuss this further once PET scan results are back.  == 7/14/22:  Patient underwent PET scan 07/08/2022 which showed continued thickening of the rectal wall extending into the anal region with increased radiotracer uptake.  This continues to extend over about 6 cm in length.  Increased radiotracer uptake remains present in the area with an SUV of approximately 22.5 compared 24.4 on the previous examination.  Indistinctness of the fat planes posteriorly remain present consistent with extra colonic extension.  There continues to be hypermetabolic lymph nodes in the inguinal region bilaterally short axis diameter is on the order of about 12 mm are present with SUVs on the order of approximately 3.4.  A mildly hypermetabolic lymph node remains present along the left sidewall of the pelvis having short axis diameter of approximately 7 mm today versus 10 mm on the previous examination.  The SUV in this area is approximately 2.1 versus 5.8 on previous exam. Considering her original diagnosis as Stage IIIA with outside oncologist, this PET scan shows it is likely, T4a with extracolonic extension and N1b with 2-3 regional LN involvement. I discussed with her about chemo-XRT  and would like to talk to radiation oncology first before she decides to pursue any treatment. I will hence make referral to Rad-onc in this regard  ==07/21/2022: Patient reports that she would like to pursue chemoradiaton. She has appointment today for radiation evaluation with Dr. Contreras. I discussed with Dr. Herndon and if she is candidate for radiation plan is for concurrent XRT with 5-fu. She will need mediport placement. Referral placed.    ==07/28/2022:  Pt here today to discuss upcoming chemotherapy, side effect profile, risk versus benefits, and expected outcomes have been discussed today. All questions and concerns answered and consents have been signed.After discussion with MYRIAMT and Dr. Herndon plan is to proceed with concurrent XRT and Xeloda. Xeloda dosage calculated with only 500mg tablets due to patient getting very confused with medications and having difficulty repeating proper dosage back after multiple attempts at teaching. She is poor candidate receive combination of 500mg and 150mg and will likely confuse them and take improper dose. She also reports taking too many to name or remember herbs and supplements which are not on medication list. I instructed her to contact clinic with list to ensure no interactions.   == 8/9/22: tolerating chemo/xrt well, states she is feeling much better since starting treatment. She does have some rectal pressure and pain, taking norco 1-2 times day   == 8/23/22: compliant with chemo/xrt, continues with rectal pain, refill of norco provided today. Labs reviewed from last week, show mild anemia.  == 9/8/22:  Pain better controlled with [place] 10/0325 patient continues to take medication 3 times a day.  Reports radiation to complete on Thursday patient is aware she will discontinue Xeloda after Thursday.  Labs reviewed and patient will return to clinic in 3 weeks to evaluate pain management  == 9/29/22: completed XRT/xeloda on 9/13/22, continues with constipation using miralax daily. Based on NCCN Guidelines we will proceed with Capox for 12-16 weeks. She will need a port placed prior to beginning.  Discussed with Dr Herndon   == 11/07/22:  Continuing with Xelox for total 12-16 weeks. Port placed in the interim.        2. Pain Management:    == I discussed with her that we have a very strict policy for pain medications and she will need to sign a pain contract. If she decides to pursue treatment for her rectal  cancer then she can be started on pain medications. XRT could also help in this regard  == Continue Norco 10/325mg PO TID prn      3. Constipation  Advised to take miralax and senokot   Limit opioid usage      Plan:  Continue Xelox  RTC in 3 weeks for f/up visit with labs for treatment          Total time spent in counseling and discussion about further management options including relevant lab work, treatment,  prognosis, medications and intended side effects was more than 60 minutes. More than 50% of the time was spent on counseling and coordination of care.  This includes face to face time and non-face to face time preparing to see the patient (eg, review of tests), Obtaining and/or reviewing separately obtained history, Documenting clinical information in the electronic or other health record, Independently interpreting resultsand communicating results to the patient/family/caregiver, or Care coordination.

## 2022-11-09 DIAGNOSIS — G89.3 CANCER RELATED PAIN: Primary | ICD-10-CM

## 2022-11-09 RX ORDER — MORPHINE SULFATE 15 MG/1
15 TABLET, FILM COATED, EXTENDED RELEASE ORAL 2 TIMES DAILY
Qty: 60 TABLET | Refills: 0 | Status: SHIPPED | OUTPATIENT
Start: 2022-11-09 | End: 2022-12-09

## 2022-11-16 ENCOUNTER — OFFICE VISIT (OUTPATIENT)
Dept: SURGERY | Facility: CLINIC | Age: 62
End: 2022-11-16
Payer: MEDICAID

## 2022-11-16 VITALS — WEIGHT: 134.19 LBS | HEIGHT: 61 IN | BODY MASS INDEX: 25.34 KG/M2

## 2022-11-16 DIAGNOSIS — C20 RECTAL CANCER: Primary | ICD-10-CM

## 2022-11-16 PROCEDURE — 3008F BODY MASS INDEX DOCD: CPT | Mod: CPTII,S$GLB,, | Performed by: SURGERY

## 2022-11-16 PROCEDURE — 99212 OFFICE O/P EST SF 10 MIN: CPT | Mod: S$GLB,,, | Performed by: SURGERY

## 2022-11-16 PROCEDURE — 1159F MED LIST DOCD IN RCRD: CPT | Mod: CPTII,S$GLB,, | Performed by: SURGERY

## 2022-11-16 PROCEDURE — 99212 PR OFFICE/OUTPT VISIT, EST, LEVL II, 10-19 MIN: ICD-10-PCS | Mod: S$GLB,,, | Performed by: SURGERY

## 2022-11-16 PROCEDURE — 3008F PR BODY MASS INDEX (BMI) DOCUMENTED: ICD-10-PCS | Mod: CPTII,S$GLB,, | Performed by: SURGERY

## 2022-11-16 PROCEDURE — 1160F RVW MEDS BY RX/DR IN RCRD: CPT | Mod: CPTII,S$GLB,, | Performed by: SURGERY

## 2022-11-16 PROCEDURE — 1159F PR MEDICATION LIST DOCUMENTED IN MEDICAL RECORD: ICD-10-PCS | Mod: CPTII,S$GLB,, | Performed by: SURGERY

## 2022-11-16 PROCEDURE — 1160F PR REVIEW ALL MEDS BY PRESCRIBER/CLIN PHARMACIST DOCUMENTED: ICD-10-PCS | Mod: CPTII,S$GLB,, | Performed by: SURGERY

## 2022-11-16 NOTE — PROGRESS NOTES
Subjective:       Patient ID: Yulissa Munguia is a 62 y.o. female.    Chief Complaint: Cancer (Pt dx w/ anal cancer and having trouble w/ BM - states very painful.)      62-year-old female with rectal cancer, perianal abscess with seton placement.  Having pain and referred by the Radiation Oncology team for a diverting colostomy.    Review of Systems   Constitutional:  Negative for chills and fever.   HENT:  Negative for nasal congestion and rhinorrhea.    Eyes:  Negative for discharge and visual disturbance.   Respiratory:  Negative for shortness of breath and wheezing.    Cardiovascular:  Negative for chest pain and palpitations.   Gastrointestinal: Negative.  Negative for abdominal distention, abdominal pain, anal bleeding, blood in stool, constipation, diarrhea, nausea, rectal pain and vomiting.   Endocrine: Negative for cold intolerance and heat intolerance.   Genitourinary:  Negative for difficulty urinating and frequency.   Musculoskeletal:  Negative for back pain and myalgias.   Integumentary:  Negative for pallor and rash.   Neurological:  Negative for dizziness and headaches.   Hematological:  Negative for adenopathy. Does not bruise/bleed easily.   Psychiatric/Behavioral:  Negative for behavioral problems. The patient is not nervous/anxious.        Objective:      Physical Exam  Constitutional:       Appearance: Normal appearance. She is well-developed.   HENT:      Head: Normocephalic and atraumatic.   Eyes:      General: Lids are normal.      Conjunctiva/sclera: Conjunctivae normal.   Neck:      Trachea: Trachea normal.   Cardiovascular:      Rate and Rhythm: Normal rate and regular rhythm.      Heart sounds: Normal heart sounds.   Pulmonary:      Effort: Pulmonary effort is normal.      Breath sounds: Normal breath sounds.   Abdominal:      General: Bowel sounds are normal. There is no distension.      Palpations: Abdomen is soft.      Tenderness: There is no abdominal tenderness.      Hernia: No  hernia is present.   Musculoskeletal:      Cervical back: Normal range of motion.   Skin:     General: Skin is warm and dry.   Neurological:      Mental Status: She is alert and oriented to person, place, and time.   Psychiatric:         Speech: Speech normal.         Behavior: Behavior normal. Behavior is cooperative.       Assessment:       Problem List Items Addressed This Visit          Oncology    Rectal cancer - Primary         Plan:       62-year-old female with rectal cancer, perianal infection and a current setons that is in place.  At a discussion about diverting colostomy, patient is amenable to surgery she will be scheduled for robotic diverting colostomy.

## 2022-11-17 DIAGNOSIS — C20 RECTAL CANCER: Primary | ICD-10-CM

## 2022-11-18 ENCOUNTER — TELEPHONE (OUTPATIENT)
Dept: SURGERY | Facility: CLINIC | Age: 62
End: 2022-11-18
Payer: MEDICAID

## 2022-11-18 ENCOUNTER — SPECIALTY PHARMACY (OUTPATIENT)
Dept: PHARMACY | Facility: CLINIC | Age: 62
End: 2022-11-18
Payer: MEDICAID

## 2022-11-18 DIAGNOSIS — C20 RECTAL CANCER: Primary | ICD-10-CM

## 2022-11-18 DIAGNOSIS — K62.89 RECTAL PAIN: ICD-10-CM

## 2022-11-18 NOTE — TELEPHONE ENCOUNTER
Outgoing call to pt to see if she needed a refill on Xeloda. Per pts daughter she was not sure of on hand count. They were at the hospital and asked for a call back on 11/21 to check in. Pending refill call accordingly.

## 2022-11-18 NOTE — TELEPHONE ENCOUNTER
Hospital requesting cardiac clearance prior to sx r/t pt hx of HTN and cardiac stent. Pt was notified after appt on Wednesday 11/16/22. Pt made an appt w/ Dr. Alberto @ [facility]. Cardiac clearance request sent to her office 11/17/22. Received a call from her office this am that they did not accept pt's insurance and could see Dr. Coleman if a referral was placed. Faxed STAT referral along w/ cardiac clearance request, OV notes, labs, CXR, and EKG to fx [phone]. Fax successfully went through. Called Dr. Coleman's office to verify if they received fax but was sent to nurse's .  for them to call back. Pt notified and verbalized understanding. Pt also notified that if clearance wasn't received by Monday that her sx would have to be r/s. Pt verbalized understanding.

## 2022-11-21 NOTE — TELEPHONE ENCOUNTER
Specialty Pharmacy - Refill Coordination    Specialty Medication Orders Linked to Encounter      Flowsheet Row Most Recent Value   Medication #1 capecitabine (XELODA) 500 MG Tab (Order#[id number], Rx#[id number])          Patient was unsure when next cycle starts, but wanted refill set up. Making high risk to get pt on track with cycles.   Refill Questions - Documented Responses      Flowsheet Row Most Recent Value   Patient Availability and HIPAA Verification    Does patient want to proceed with activity? Yes   HIPAA/medical authority confirmed? Yes   Relationship to patient of person spoken to? Self   Refill Screening Questions    Changes to allergies? No   Changes to medications? No   New conditions since last clinic visit? No   Unplanned office visit, urgent care, ED, or hospital admission in the last 4 weeks? No   How does patient/caregiver feel medication is working? Good   Financial problems or insurance changes? No   How many doses of your specialty medications were missed in the last 4 weeks? 0   Would patient like to speak to a pharmacist? No   When does the patient need to receive the medication? 11/25/22   Refill Delivery Questions    How will the patient receive the medication? Mail   When does the patient need to receive the medication? 11/25/22   Shipping Address Home   Address in [facility] confirmed and updated if neccessary? Yes   Expected Copay ($) 0   Is the patient able to afford the medication copay? Yes   Payment Method zero copay   Days supply of Refill 21   Supplies needed? No supplies needed   Refill activity completed? Yes   Refill activity plan Refill scheduled   Shipment/Pickup Date: 11/22/22            Current Outpatient Medications   Medication Sig    ALPRAZolam (XANAX) 0.25 MG tablet Take 1 tablet prior to radiological scan for claustrophobia/anxiety    aspirin 81 MG Chew Take 81 mg by mouth.    capecitabine (XELODA) 500 MG Tab Take 3 tablets (1,500 mg total) by mouth 2 (two)  times daily Take on days 1-14 of 21 day chemotherapy cycle.    ferrous sulfate 324 mg (65 mg iron) TbEC     HYDROcodone-acetaminophen (NORCO)  mg per tablet Take 1 tablet by mouth every 8 (eight) hours as needed for Pain.    morphine (MS CONTIN) 15 MG 12 hr tablet Take 1 tablet (15 mg total) by mouth 2 (two) times daily. (Patient not taking: Reported on 11/16/2022)    multivitamin capsule     nitroGLYCERIN (NITROSTAT) 0.4 MG SL tablet Place 0.4 mg under the tongue.    ondansetron (ZOFRAN) 8 MG tablet Take 1 tablet (8 mg total) by mouth every 8 (eight) hours as needed for Nausea.    ondansetron (ZOFRAN-ODT) 8 MG TbDL Take 1 tablet (8 mg total) by mouth every 6 (six) hours as needed.    promethazine (PHENERGAN) 25 MG tablet Take 1 tablet (25 mg total) by mouth every 8 (eight) hours.    turmeric 400 mg Cap Take by mouth.   Last reviewed on 11/16/2022  8:50 AM by Dav Torre, DO    Review of patient's allergies indicates:  No Known Allergies Last reviewed on  11/16/2022 8:50 AM by Dav Torre      Tasks added this encounter   12/12/2022 - Refill Call (Auto Added)   Tasks due within next 3 months   No tasks due.     Cassie Mccormick, PharmD  [facility]  [address] [place] [id number]  Phone: [phone]  Fax: [phone]

## 2022-11-21 NOTE — TELEPHONE ENCOUNTER
Pt stated she has an OV w/ Dr. Alberto today @ 12pm at [facility] for cardiac clearance. Called and LM on nurse's VM after lunch and didn't receive call back. Just called and the nurse states pt was cleared and that she hasn't had time to fax it to us. Asked if they could fax asap due to pt having sx tomorrow. Nurses states she will fax as soon as she can. Called [facility] and talked to someone in surgery directly to let them know that pt was cleared and that I am just waiting on the fax. Surgery personnel verbalized understanding.

## 2022-11-22 ENCOUNTER — OUTSIDE PLACE OF SERVICE (OUTPATIENT)
Dept: SURGERY | Facility: CLINIC | Age: 62
End: 2022-11-22

## 2022-11-22 LAB
ABS NRBC COUNT: 0 THOU/UL (ref 0–0.01)
ABSOLUTE BASOPHIL: 0 10*3/UL (ref 0–0.3)
ABSOLUTE EOSINOPHIL: 0 10*3/UL (ref 0–0.6)
ABSOLUTE IMMATURE GRAN: 0.03 THOU/UL (ref 0–0.03)
ABSOLUTE LYMPHOCYTE: 0.3 10*3/UL (ref 1.2–4)
ABSOLUTE MONOCYTE: 0.5 10*3/UL (ref 0.1–0.8)
ANION GAP SERPL CALC-SCNC: 3 MMOL/L (ref 3–11)
APTT PPP: 31.9 SEC (ref 25.7–36.7)
BASOPHILS NFR BLD: 0.1 % (ref 0–3)
BUN SERPL-MCNC: 6 MG/DL (ref 7–18)
BUN/CREAT SERPL: 13.63 RATIO
CALCIUM SERPL-MCNC: 9.1 MG/DL (ref 8.5–10.1)
CHLORIDE SERPL-SCNC: 107 MMOL/L (ref 98–107)
CO2 SERPL-SCNC: 28 MMOL/L (ref 21–32)
CREAT SERPL-MCNC: 0.44 MG/DL (ref 0.55–1.02)
EOSINOPHIL NFR BLD: 0 % (ref 0–6)
ERYTHROCYTE [DISTWIDTH] IN BLOOD BY AUTOMATED COUNT: 17.3 % (ref 0–15.5)
GFR ESTIMATION: > 60
GLUCOSE SERPL-MCNC: 131 MG/DL (ref 74–106)
HCT VFR BLD AUTO: 33 % (ref 37–47)
HGB BLD-MCNC: 10.8 G/DL (ref 12–16)
IMMATURE GRANULOCYTES: 0.4 % (ref 0–0.43)
INR PPP: 1 INR (ref 0.9–1.1)
LYMPHOCYTES NFR BLD: 4.4 % (ref 20–45)
MCH RBC QN AUTO: 28 PG (ref 27–32)
MCHC RBC AUTO-ENTMCNC: 32.7 % (ref 32–36)
MCV RBC AUTO: 85.5 FL (ref 80–99)
MONOCYTES NFR BLD: 6.6 % (ref 2–10)
NEUTROPHILS # BLD AUTO: 6.6 10*3/UL (ref 1.4–7)
NEUTROPHILS NFR BLD: 88.5 % (ref 50–80)
NUCLEATED RED BLOOD CELLS: 0 % (ref 0–0.2)
PERIPHERAL SMEAR: NORMAL
PLATELETS: 402 10*3/UL (ref 130–400)
PMV BLD AUTO: 8.7 FL (ref 9.2–12.2)
POTASSIUM SERPL-SCNC: 3.8 MMOL/L (ref 3.5–5.1)
PROTHROMBIN TIME: 11.8 SEC (ref 10.2–12.9)
RBC # BLD AUTO: 3.86 10*6/UL (ref 4.2–5.4)
SODIUM BLD-SCNC: 138 MMOL/L (ref 131–143)
WBC # BLD: 7.5 10*3/UL (ref 4.5–10)

## 2022-11-22 PROCEDURE — 44188 LAP COLOSTOMY: CPT | Mod: ,,, | Performed by: SURGERY

## 2022-11-22 PROCEDURE — 44188 PR LAP, SURG COLOSTOMY: ICD-10-PCS | Mod: ,,, | Performed by: SURGERY

## 2022-11-23 ENCOUNTER — OUTSIDE PLACE OF SERVICE (OUTPATIENT)
Dept: SURGERY | Facility: CLINIC | Age: 62
End: 2022-11-23
Payer: MEDICAID

## 2022-11-23 ENCOUNTER — SPECIALTY PHARMACY (OUTPATIENT)
Dept: PHARMACY | Facility: CLINIC | Age: 62
End: 2022-11-23
Payer: MEDICAID

## 2022-11-23 PROCEDURE — 99024 PR POST-OP FOLLOW-UP VISIT: ICD-10-PCS | Mod: ,,, | Performed by: SURGERY

## 2022-11-23 PROCEDURE — 99024 POSTOP FOLLOW-UP VISIT: CPT | Mod: ,,, | Performed by: SURGERY

## 2022-11-23 NOTE — TELEPHONE ENCOUNTER
Incoming call regarding return call to the Pharmacist. Pt stated that she is in the hospital. Pt stated that she has about 5 days on hand and her new cycle started on 11/8/22 till 11/29/22 pt stated she was still in the hospital. Transferred to [facility].

## 2022-11-23 NOTE — TELEPHONE ENCOUNTER
Outgoing call to pt. Pt reports to having surgery 11/22. She reports not being aware if she was supposed to continue or hold xeloda. Per pt started cycle 11/8. Pt should be on off week but is not sure. She reports to missing 2 doses. Will message provider.

## 2022-11-23 NOTE — TELEPHONE ENCOUNTER
Spoke to patient about cycle length, how to take medication. Pt has not heard from office. Will follow-up this afternoon.

## 2022-11-23 NOTE — TELEPHONE ENCOUNTER
Incoming call, per patient voicemail, patient has stopped taking Xeloda due to surgery, she states she doesn't think it was a good idea but that she was taking so many medications she became confused. Patient would like a call back , routing assigned [facility] to follow up with patient.

## 2022-11-25 PROCEDURE — G0180 PR HOME HEALTH MD CERTIFICATION: ICD-10-PCS | Mod: ,,, | Performed by: SURGERY

## 2022-11-25 PROCEDURE — G0180 MD CERTIFICATION HHA PATIENT: HCPCS | Mod: ,,, | Performed by: SURGERY

## 2022-11-28 ENCOUNTER — TELEPHONE (OUTPATIENT)
Dept: SURGERY | Facility: CLINIC | Age: 62
End: 2022-11-28
Payer: MEDICAID

## 2022-11-28 DIAGNOSIS — G89.3 CANCER RELATED PAIN: ICD-10-CM

## 2022-11-28 RX ORDER — HYDROCODONE BITARTRATE AND ACETAMINOPHEN 10; 325 MG/1; MG/1
1 TABLET ORAL EVERY 8 HOURS PRN
Qty: 90 TABLET | Refills: 0 | OUTPATIENT
Start: 2022-11-28

## 2022-11-28 NOTE — TELEPHONE ENCOUNTER
----- Message from Bette Santos sent at 11/28/2022 10:07 AM CST -----  Contact: pt  Pt is requesting a call back at [phone]. States she was told her rx for pain med were ready at [facility] 5th floor however rx was not there and she was told to reach put to the provider. Rx oxycodone. Pt is out of meds and in great pain. Pls return call asap. Thnaks DB

## 2022-11-28 NOTE — TELEPHONE ENCOUNTER
MD notified and gave following orders: percocet 5mg 1 tab q4-6 hrs prn severe pain. TORB. Pt notified and states her  will  hard copy from office this afternoon. Pt informed to have pt's  bring ID. Pt verbalized understanding.

## 2022-11-29 ENCOUNTER — OFFICE VISIT (OUTPATIENT)
Dept: HEMATOLOGY/ONCOLOGY | Facility: CLINIC | Age: 62
End: 2022-11-29
Payer: MEDICAID

## 2022-11-29 VITALS
SYSTOLIC BLOOD PRESSURE: 96 MMHG | OXYGEN SATURATION: 97 % | BODY MASS INDEX: 25.3 KG/M2 | HEART RATE: 90 BPM | WEIGHT: 134 LBS | RESPIRATION RATE: 18 BRPM | HEIGHT: 61 IN | DIASTOLIC BLOOD PRESSURE: 63 MMHG

## 2022-11-29 DIAGNOSIS — C20 RECTAL CANCER: Primary | ICD-10-CM

## 2022-11-29 DIAGNOSIS — K62.89 RECTAL PAIN: ICD-10-CM

## 2022-11-29 DIAGNOSIS — G89.3 CANCER RELATED PAIN: ICD-10-CM

## 2022-11-29 DIAGNOSIS — Z93.3 COLOSTOMY PRESENT: ICD-10-CM

## 2022-11-29 LAB
ALBUMIN SERPL BCP-MCNC: 2.7 G/DL (ref 3.4–5)
ALBUMIN/GLOBULIN RATIO: 0.63 RATIO (ref 1.1–1.8)
ALP SERPL-CCNC: 75 U/L (ref 46–116)
ALT SERPL W P-5'-P-CCNC: 17 U/L (ref 12–78)
ANION GAP SERPL CALC-SCNC: 7 MMOL/L (ref 3–11)
AST SERPL-CCNC: 14 U/L (ref 15–37)
BANDS: 1 % (ref 0–5)
BILIRUB SERPL-MCNC: 0.3 MG/DL (ref 0–1)
BUN SERPL-MCNC: 7 MG/DL (ref 7–18)
BUN/CREAT SERPL: 12.28 RATIO (ref 7–18)
CALCIUM SERPL-MCNC: 8.9 MG/DL (ref 8.8–10.5)
CELLS COUNTED: 100
CHLORIDE SERPL-SCNC: 101 MMOL/L (ref 100–108)
CO2 SERPL-SCNC: 27 MMOL/L (ref 21–32)
CREAT SERPL-MCNC: 0.57 MG/DL (ref 0.55–1.02)
EOSINOPHIL NFR BLD: 4 % (ref 1–3)
ERYTHROCYTE [DISTWIDTH] IN BLOOD BY AUTOMATED COUNT: 17.5 % (ref 12.5–18)
GFR ESTIMATION: > 60
GLOBULIN: 4.3 G/DL (ref 2.3–3.5)
GLUCOSE SERPL-MCNC: 97 MG/DL (ref 70–110)
HCT VFR BLD AUTO: 31.3 % (ref 37–47)
HGB BLD-MCNC: 10.3 G/DL (ref 12–16)
LYMPHOCYTES NFR BLD: 21 % (ref 25–40)
MCH RBC QN AUTO: 28.5 PG (ref 27–31.2)
MCHC RBC AUTO-ENTMCNC: 32.9 G/DL (ref 31.8–35.4)
MCV RBC AUTO: 86.5 FL (ref 80–97)
MONOCYTES NFR BLD: 11 % (ref 1–15)
NEUTROPHILS # BLD AUTO: 3.8 10*3/UL (ref 1.8–7.7)
NEUTROPHILS NFR BLD: 63 % (ref 37–80)
NUCLEATED RED BLOOD CELLS: 0 %
PLATELETS: 317 10*3/UL (ref 142–424)
POTASSIUM SERPL-SCNC: 3.8 MMOL/L (ref 3.6–5.2)
PROT SERPL-MCNC: 7 G/DL (ref 6.4–8.2)
RBC # BLD AUTO: 3.62 10*6/UL (ref 4.2–5.4)
RBC MORPH BLD: ABNORMAL
SMALL PLATELETS BLD QL SMEAR: ADEQUATE
SODIUM BLD-SCNC: 135 MMOL/L (ref 135–145)
WBC # BLD: 6 10*3/UL (ref 4.6–10.2)

## 2022-11-29 PROCEDURE — 1159F MED LIST DOCD IN RCRD: CPT | Mod: CPTII,S$GLB,, | Performed by: NURSE PRACTITIONER

## 2022-11-29 PROCEDURE — 3074F PR MOST RECENT SYSTOLIC BLOOD PRESSURE < 130 MM HG: ICD-10-PCS | Mod: CPTII,S$GLB,, | Performed by: NURSE PRACTITIONER

## 2022-11-29 PROCEDURE — 3078F PR MOST RECENT DIASTOLIC BLOOD PRESSURE < 80 MM HG: ICD-10-PCS | Mod: CPTII,S$GLB,, | Performed by: NURSE PRACTITIONER

## 2022-11-29 PROCEDURE — 1160F PR REVIEW ALL MEDS BY PRESCRIBER/CLIN PHARMACIST DOCUMENTED: ICD-10-PCS | Mod: CPTII,S$GLB,, | Performed by: NURSE PRACTITIONER

## 2022-11-29 PROCEDURE — 3074F SYST BP LT 130 MM HG: CPT | Mod: CPTII,S$GLB,, | Performed by: NURSE PRACTITIONER

## 2022-11-29 PROCEDURE — 1160F RVW MEDS BY RX/DR IN RCRD: CPT | Mod: CPTII,S$GLB,, | Performed by: NURSE PRACTITIONER

## 2022-11-29 PROCEDURE — 3078F DIAST BP <80 MM HG: CPT | Mod: CPTII,S$GLB,, | Performed by: NURSE PRACTITIONER

## 2022-11-29 PROCEDURE — 3008F BODY MASS INDEX DOCD: CPT | Mod: CPTII,S$GLB,, | Performed by: NURSE PRACTITIONER

## 2022-11-29 PROCEDURE — 99214 OFFICE O/P EST MOD 30 MIN: CPT | Mod: S$GLB,,, | Performed by: NURSE PRACTITIONER

## 2022-11-29 PROCEDURE — 3008F PR BODY MASS INDEX (BMI) DOCUMENTED: ICD-10-PCS | Mod: CPTII,S$GLB,, | Performed by: NURSE PRACTITIONER

## 2022-11-29 PROCEDURE — 1159F PR MEDICATION LIST DOCUMENTED IN MEDICAL RECORD: ICD-10-PCS | Mod: CPTII,S$GLB,, | Performed by: NURSE PRACTITIONER

## 2022-11-29 PROCEDURE — 99214 PR OFFICE/OUTPT VISIT, EST, LEVL IV, 30-39 MIN: ICD-10-PCS | Mod: S$GLB,,, | Performed by: NURSE PRACTITIONER

## 2022-11-29 NOTE — PROGRESS NOTES
MEDICAL ONCOLOGY FOLLOW UP CONSULTATION NOTE    Patient ID: Yulissa Munguia is a 62 y.o. female.    Chief Complaint: Rectal cancer    HPI:  Patient is a 61-year-old female who was previously diagnosed with rectal cancer about a year back and was seen and evaluated by [facility] group with recommendations for chemo XRT for for presumed rectal cancer stage III A. Patient however was reluctant to get any treatment and did not pursue any medical care since then.  She now was referred by her primary care provider after patient was dismissed from [facility].  She presents to our clinic today with her daughters for further evaluation.  She still does not seem to be interested in pursuing chemo or radiation options but would like to have a discussion about her prognosis.  She also reports having a rectal tube placed recently in [place] but I have no records to suggest that it was done recently and for what indication.  She continues to smoke every day and does not seem to actually believe in her cancer diagnosis.      Social History     Socioeconomic History    Marital status:    Tobacco Use    Smoking status: Every Day     Years: 20.00     Types: Cigarettes    Smokeless tobacco: Never    Tobacco comments:     would be interested in smoking cessation in the future   Substance and Sexual Activity    Alcohol use: Not Currently    Drug use: Not Currently     Past Surgical History:   Procedure Laterality Date    ADENOIDECTOMY       SECTION      CORONARY ANGIOPLASTY WITH STENT PLACEMENT      PORTACATH PLACEMENT      TONSILLECTOMY      TUBAL LIGATION           Review of systems:  Review of Systems   Constitutional:  Positive for activity change and appetite change. Negative for chills, diaphoresis, fatigue and unexpected weight change.   HENT:  Negative for congestion, facial swelling, hearing loss, mouth sores, trouble swallowing and voice change.    Eyes:  Negative for photophobia, pain,  discharge and itching.   Respiratory:  Negative for apnea, cough, choking, chest tightness and shortness of breath.    Cardiovascular:  Negative for chest pain, palpitations and leg swelling.   Gastrointestinal:  Positive for rectal pain. Negative for abdominal distention, abdominal pain, anal bleeding and blood in stool.   Endocrine: Negative for cold intolerance, heat intolerance, polydipsia and polyphagia.   Genitourinary:  Negative for difficulty urinating, dysuria, flank pain and hematuria.   Musculoskeletal:  Negative for arthralgias, back pain, joint swelling, myalgias, neck pain and neck stiffness.   Skin:  Negative for color change, pallor and wound.   Allergic/Immunologic: Negative for environmental allergies, food allergies and immunocompromised state.   Neurological:  Negative for dizziness, seizures, facial asymmetry, speech difficulty, light-headedness, numbness and headaches.   Hematological:  Negative for adenopathy. Does not bruise/bleed easily.   Psychiatric/Behavioral:  Negative for agitation, behavioral problems, confusion, decreased concentration and sleep disturbance.            Physical Exam  Vitals and nursing note reviewed.   Constitutional:       General: She is not in acute distress.     Appearance: Normal appearance. She is not ill-appearing.   HENT:      Head: Normocephalic and atraumatic.      Nose: No congestion or rhinorrhea.   Eyes:      General: No scleral icterus.     Extraocular Movements: Extraocular movements intact.      Pupils: Pupils are equal, round, and reactive to light.   Cardiovascular:      Rate and Rhythm: Normal rate and regular rhythm.      Pulses: Normal pulses.      Heart sounds: Normal heart sounds. No murmur heard.    No gallop.   Pulmonary:      Effort: Pulmonary effort is normal. No respiratory distress.      Breath sounds: Normal breath sounds. No stridor. No wheezing or rhonchi.   Abdominal:      General: Bowel sounds are normal. There is no distension.       Palpations: There is no mass.      Tenderness: There is no abdominal tenderness. There is no guarding.   Musculoskeletal:         General: No swelling, tenderness, deformity or signs of injury. Normal range of motion.      Cervical back: Normal range of motion and neck supple. No rigidity. No muscular tenderness.      Right lower leg: No edema.      Left lower leg: No edema.   Skin:     General: Skin is warm.      Coloration: Skin is not jaundiced or pale.      Findings: No bruising or lesion.   Neurological:      General: No focal deficit present.      Mental Status: She is alert and oriented to person, place, and time.      Cranial Nerves: No cranial nerve deficit.      Sensory: No sensory deficit.      Motor: No weakness.      Gait: Gait normal.   Psychiatric:         Mood and Affect: Mood normal.         Behavior: Behavior normal.         Thought Content: Thought content normal.     Vitals:    11/29/22 1140   Resp: 18   Body surface area is 1.62 meters squared.    IMAGING:          Assessment/Plan:      ECOG 1     1. Rectal Cancer:    == Initially diagnosed in July 2021 as stage IIIA rectal cancer.  She has not followed and not pursued any treatment since then as she is skeptical about chemotherapy and side effects of radiation.  She believes an alternate treatment options which she has likely been doing since that time.  After recent dismissal from [facility] she presents to us for further discussion of treatment and management options.  == I discussed with her that restaging scan would be needed to accurately determine her current cancer stage and based on that would recommend treatment with either palliative or curative intent.  She has neglected rectal cancer over the last 1 year at this time and I am strongly suspecting metastasis is with stage IV disease.  I discussed with her that this is an Oncology Clinic and we would only talk about standard treatment with chemotherapy plus-minus  radiation options.  Patient is more interested interested in alternative treatment discussions and I discussed with her that this is not something we do in this clinic.  She voiced understanding but would like to get her PET scan done and then return to clinic for further discussion.  She is not currently agreeable to chemotherapy but would like to discuss this further once PET scan results are back.  == 7/14/22:  Patient underwent PET scan 07/08/2022 which showed continued thickening of the rectal wall extending into the anal region with increased radiotracer uptake.  This continues to extend over about 6 cm in length.  Increased radiotracer uptake remains present in the area with an SUV of approximately 22.5 compared 24.4 on the previous examination.  Indistinctness of the fat planes posteriorly remain present consistent with extra colonic extension.  There continues to be hypermetabolic lymph nodes in the inguinal region bilaterally short axis diameter is on the order of about 12 mm are present with SUVs on the order of approximately 3.4.  A mildly hypermetabolic lymph node remains present along the left sidewall of the pelvis having short axis diameter of approximately 7 mm today versus 10 mm on the previous examination.  The SUV in this area is approximately 2.1 versus 5.8 on previous exam. Considering her original diagnosis as Stage IIIA with outside oncologist, this PET scan shows it is likely, T4a with extracolonic extension and N1b with 2-3 regional LN involvement. I discussed with her about chemo-XRT  and would like to talk to radiation oncology first before she decides to pursue any treatment. I will hence make referral to Rad-onc in this regard  ==07/21/2022: Patient reports that she would like to pursue chemoradiaton. She has appointment today for radiation evaluation with Dr. Contreras. I discussed with Dr. Herndon and if she is candidate for radiation plan is for concurrent XRT with 5-fu. She will need  mediport placement. Referral placed.   ==07/28/2022:  Pt here today to discuss upcoming chemotherapy, side effect profile, risk versus benefits, and expected outcomes have been discussed today. All questions and concerns answered and consents have been signed.After discussion with JACINTO and Dr. Herndon plan is to proceed with concurrent XRT and Xeloda. Xeloda dosage calculated with only 500mg tablets due to patient getting very confused with medications and having difficulty repeating proper dosage back after multiple attempts at teaching. She is poor candidate receive combination of 500mg and 150mg and will likely confuse them and take improper dose. She also reports taking too many to name or remember herbs and supplements which are not on medication list. I instructed her to contact clinic with list to ensure no interactions.   == 8/9/22: tolerating chemo/xrt well, states she is feeling much better since starting treatment. She does have some rectal pressure and pain, taking norco 1-2 times day   == 8/23/22: compliant with chemo/xrt, continues with rectal pain, refill of norco provided today. Labs reviewed from last week, show mild anemia.  == 9/8/22:  Pain better controlled with [place] 10/0325 patient continues to take medication 3 times a day.  Reports radiation to complete on Thursday patient is aware she will discontinue Xeloda after Thursday.  Labs reviewed and patient will return to clinic in 3 weeks to evaluate pain management  == 9/29/22: completed XRT/xeloda on 9/13/22, continues with constipation using miralax daily. Based on NCCN Guidelines we will proceed with Capox for 12-16 weeks. She will need a port placed prior to beginning.  Discussed with Dr Herndon   == 11/07/22:  Continuing with Xelox for total 12-16 weeks. Port placed in the interim.  == 11/29/22: s/p diverting colostomy with Dr Torre on 11/22/22. She continues with c/o of rectal pain advised to begin MS ER BID as provided by Dr Herndon at last  visit and to use pecocet provided by Dr Torre as she is out of [place]. We will plan to refill norco once she is out of her percocet.   Will delay treatment this week as she is recovering from surgery. Advised to hold Xeloda       2. Pain Management:    == as above      3. Constipation  Advised to take miralax and senokot   Limit opioid usage      Plan:  Continue Xelox  RTC in 3 weeks for f/up visit with labs for treatment          Total time spent in counseling and discussion about further management options including relevant lab work, treatment,  prognosis, medications and intended side effects was more than 60 minutes. More than 50% of the time was spent on counseling and coordination of care.  This includes face to face time and non-face to face time preparing to see the patient (eg, review of tests), Obtaining and/or reviewing separately obtained history, Documenting clinical information in the electronic or other health record, Independently interpreting resultsand communicating results to the patient/family/caregiver, or Care coordination.

## 2022-11-30 ENCOUNTER — OFFICE VISIT (OUTPATIENT)
Dept: SURGERY | Facility: CLINIC | Age: 62
End: 2022-11-30
Payer: MEDICAID

## 2022-11-30 DIAGNOSIS — C20 RECTAL CANCER: Primary | ICD-10-CM

## 2022-11-30 PROCEDURE — 1160F PR REVIEW ALL MEDS BY PRESCRIBER/CLIN PHARMACIST DOCUMENTED: ICD-10-PCS | Mod: CPTII,S$GLB,, | Performed by: SURGERY

## 2022-11-30 PROCEDURE — 99024 PR POST-OP FOLLOW-UP VISIT: ICD-10-PCS | Mod: S$GLB,,, | Performed by: SURGERY

## 2022-11-30 PROCEDURE — 1159F PR MEDICATION LIST DOCUMENTED IN MEDICAL RECORD: ICD-10-PCS | Mod: CPTII,S$GLB,, | Performed by: SURGERY

## 2022-11-30 PROCEDURE — 1160F RVW MEDS BY RX/DR IN RCRD: CPT | Mod: CPTII,S$GLB,, | Performed by: SURGERY

## 2022-11-30 PROCEDURE — 99024 POSTOP FOLLOW-UP VISIT: CPT | Mod: S$GLB,,, | Performed by: SURGERY

## 2022-11-30 PROCEDURE — 1159F MED LIST DOCD IN RCRD: CPT | Mod: CPTII,S$GLB,, | Performed by: SURGERY

## 2022-11-30 NOTE — PROGRESS NOTES
Patient is here for her 2 week postop visit.  States that the colostomy has been working but over the last couple days she is not passed any gas or stool.  States that the stoma is hard and painful.      The stoma appears edematous dusky, it is tender to touch.      Sent the patient to the emergency room for evaluation and a CT scan to evaluate the ostomy.

## 2022-12-02 ENCOUNTER — SPECIALTY PHARMACY (OUTPATIENT)
Dept: PHARMACY | Facility: CLINIC | Age: 62
End: 2022-12-02
Payer: MEDICAID

## 2022-12-02 DIAGNOSIS — K62.89 RECTAL PAIN: ICD-10-CM

## 2022-12-02 DIAGNOSIS — C20 RECTAL CANCER: Primary | ICD-10-CM

## 2022-12-05 LAB
ALBUMIN SERPL BCP-MCNC: 2.9 G/DL (ref 3.4–5)
ALBUMIN/GLOBULIN RATIO: 0.67 RATIO (ref 1.1–1.8)
ALP SERPL-CCNC: 79 U/L (ref 46–116)
ALT SERPL W P-5'-P-CCNC: 18 U/L (ref 12–78)
ANION GAP SERPL CALC-SCNC: 7 MMOL/L (ref 3–11)
AST SERPL-CCNC: 18 U/L (ref 15–37)
BASOPHILS NFR BLD: 0.7 % (ref 0–3)
BILIRUB SERPL-MCNC: 0.2 MG/DL (ref 0–1)
BUN SERPL-MCNC: 12 MG/DL (ref 7–18)
BUN/CREAT SERPL: 22.22 RATIO (ref 7–18)
CALCIUM SERPL-MCNC: 9.4 MG/DL (ref 8.8–10.5)
CHLORIDE SERPL-SCNC: 104 MMOL/L (ref 100–108)
CO2 SERPL-SCNC: 28 MMOL/L (ref 21–32)
CREAT SERPL-MCNC: 0.54 MG/DL (ref 0.55–1.02)
EOSINOPHIL NFR BLD: 5.1 % (ref 1–3)
ERYTHROCYTE [DISTWIDTH] IN BLOOD BY AUTOMATED COUNT: 17.2 % (ref 12.5–18)
GFR ESTIMATION: > 60
GLOBULIN: 4.3 G/DL (ref 2.3–3.5)
GLUCOSE SERPL-MCNC: 86 MG/DL (ref 70–110)
HCT VFR BLD AUTO: 33 % (ref 37–47)
HGB BLD-MCNC: 10.7 G/DL (ref 12–16)
LYMPHOCYTES NFR BLD: 14.2 % (ref 25–40)
MCH RBC QN AUTO: 28.7 PG (ref 27–31.2)
MCHC RBC AUTO-ENTMCNC: 32.4 G/DL (ref 31.8–35.4)
MCV RBC AUTO: 88.5 FL (ref 80–97)
MONOCYTES NFR BLD: 13.7 % (ref 1–15)
NEUTROPHILS # BLD AUTO: 3.9 10*3/UL (ref 1.8–7.7)
NEUTROPHILS NFR BLD: 65.8 % (ref 37–80)
NUCLEATED RED BLOOD CELLS: 0 %
PLATELETS: 369 10*3/UL (ref 142–424)
POTASSIUM SERPL-SCNC: 4.1 MMOL/L (ref 3.6–5.2)
PROT SERPL-MCNC: 7.2 G/DL (ref 6.4–8.2)
RBC # BLD AUTO: 3.73 10*6/UL (ref 4.2–5.4)
SODIUM BLD-SCNC: 139 MMOL/L (ref 135–145)
WBC # BLD: 5.9 10*3/UL (ref 4.6–10.2)

## 2022-12-08 ENCOUNTER — OFFICE VISIT (OUTPATIENT)
Dept: HEMATOLOGY/ONCOLOGY | Facility: CLINIC | Age: 62
End: 2022-12-08
Payer: MEDICAID

## 2022-12-08 ENCOUNTER — TELEPHONE (OUTPATIENT)
Dept: HEMATOLOGY/ONCOLOGY | Facility: CLINIC | Age: 62
End: 2022-12-08

## 2022-12-08 VITALS
HEIGHT: 61 IN | RESPIRATION RATE: 16 BRPM | WEIGHT: 137 LBS | TEMPERATURE: 98 F | SYSTOLIC BLOOD PRESSURE: 104 MMHG | BODY MASS INDEX: 25.86 KG/M2 | HEART RATE: 76 BPM | DIASTOLIC BLOOD PRESSURE: 71 MMHG | OXYGEN SATURATION: 98 %

## 2022-12-08 DIAGNOSIS — G89.3 CANCER RELATED PAIN: ICD-10-CM

## 2022-12-08 DIAGNOSIS — C20 RECTAL CANCER: Primary | ICD-10-CM

## 2022-12-08 PROCEDURE — 1160F RVW MEDS BY RX/DR IN RCRD: CPT | Mod: CPTII,S$GLB,, | Performed by: NURSE PRACTITIONER

## 2022-12-08 PROCEDURE — 3008F BODY MASS INDEX DOCD: CPT | Mod: CPTII,S$GLB,, | Performed by: NURSE PRACTITIONER

## 2022-12-08 PROCEDURE — 3074F PR MOST RECENT SYSTOLIC BLOOD PRESSURE < 130 MM HG: ICD-10-PCS | Mod: CPTII,S$GLB,, | Performed by: NURSE PRACTITIONER

## 2022-12-08 PROCEDURE — 1159F PR MEDICATION LIST DOCUMENTED IN MEDICAL RECORD: ICD-10-PCS | Mod: CPTII,S$GLB,, | Performed by: NURSE PRACTITIONER

## 2022-12-08 PROCEDURE — 3074F SYST BP LT 130 MM HG: CPT | Mod: CPTII,S$GLB,, | Performed by: NURSE PRACTITIONER

## 2022-12-08 PROCEDURE — 3008F PR BODY MASS INDEX (BMI) DOCUMENTED: ICD-10-PCS | Mod: CPTII,S$GLB,, | Performed by: NURSE PRACTITIONER

## 2022-12-08 PROCEDURE — 1160F PR REVIEW ALL MEDS BY PRESCRIBER/CLIN PHARMACIST DOCUMENTED: ICD-10-PCS | Mod: CPTII,S$GLB,, | Performed by: NURSE PRACTITIONER

## 2022-12-08 PROCEDURE — 1159F MED LIST DOCD IN RCRD: CPT | Mod: CPTII,S$GLB,, | Performed by: NURSE PRACTITIONER

## 2022-12-08 PROCEDURE — 99214 PR OFFICE/OUTPT VISIT, EST, LEVL IV, 30-39 MIN: ICD-10-PCS | Mod: S$GLB,,, | Performed by: NURSE PRACTITIONER

## 2022-12-08 PROCEDURE — 3078F DIAST BP <80 MM HG: CPT | Mod: CPTII,S$GLB,, | Performed by: NURSE PRACTITIONER

## 2022-12-08 PROCEDURE — 99214 OFFICE O/P EST MOD 30 MIN: CPT | Mod: S$GLB,,, | Performed by: NURSE PRACTITIONER

## 2022-12-08 PROCEDURE — 3078F PR MOST RECENT DIASTOLIC BLOOD PRESSURE < 80 MM HG: ICD-10-PCS | Mod: CPTII,S$GLB,, | Performed by: NURSE PRACTITIONER

## 2022-12-08 RX ORDER — SODIUM CHLORIDE 0.9 % (FLUSH) 0.9 %
10 SYRINGE (ML) INJECTION
Status: CANCELLED | OUTPATIENT
Start: 2022-12-08

## 2022-12-08 RX ORDER — HEPARIN 100 UNIT/ML
500 SYRINGE INTRAVENOUS
Status: CANCELLED | OUTPATIENT
Start: 2022-12-08

## 2022-12-08 RX ORDER — EPINEPHRINE 0.3 MG/.3ML
0.3 INJECTION SUBCUTANEOUS ONCE AS NEEDED
Status: CANCELLED | OUTPATIENT
Start: 2022-12-08

## 2022-12-08 RX ORDER — DIPHENHYDRAMINE HYDROCHLORIDE 50 MG/ML
50 INJECTION INTRAMUSCULAR; INTRAVENOUS ONCE AS NEEDED
Status: CANCELLED | OUTPATIENT
Start: 2022-12-08

## 2022-12-08 NOTE — PROGRESS NOTES
MEDICAL ONCOLOGY FOLLOW UP CONSULTATION NOTE    Patient ID: Yulissa Munguia is a 62 y.o. female.    Chief Complaint: Rectal cancer    HPI:  Patient is a 61-year-old female who was previously diagnosed with rectal cancer about a year back and was seen and evaluated by [facility] group with recommendations for chemo XRT for for presumed rectal cancer stage III A. Patient however was reluctant to get any treatment and did not pursue any medical care since then.  She now was referred by her primary care provider after patient was dismissed from [facility].  She presents to our clinic today with her daughters for further evaluation.  She still does not seem to be interested in pursuing chemo or radiation options but would like to have a discussion about her prognosis.  She also reports having a rectal tube placed recently in [employer] but I have no records to suggest that it was done recently and for what indication.  She continues to smoke every day and does not seem to actually believe in her cancer diagnosis.      Social History     Socioeconomic History    Marital status:    Tobacco Use    Smoking status: Every Day     Years: 20.00     Types: Cigarettes    Smokeless tobacco: Never    Tobacco comments:     would be interested in smoking cessation in the future   Substance and Sexual Activity    Alcohol use: Not Currently    Drug use: Not Currently     Past Surgical History:   Procedure Laterality Date    ADENOIDECTOMY       SECTION      CORONARY ANGIOPLASTY WITH STENT PLACEMENT      PORTACATH PLACEMENT      ROBOT-ASSISTED CREATION OF COLOSTOMY USING DA MARTINA XI Left     TONSILLECTOMY      TUBAL LIGATION           Review of systems:  Review of Systems   Constitutional:  Positive for activity change and appetite change. Negative for chills, diaphoresis, fatigue and unexpected weight change.   HENT:  Negative for congestion, facial swelling, hearing loss, mouth sores, trouble  swallowing and voice change.    Eyes:  Negative for photophobia, pain, discharge and itching.   Respiratory:  Negative for apnea, cough, choking, chest tightness and shortness of breath.    Cardiovascular:  Negative for chest pain, palpitations and leg swelling.   Gastrointestinal:  Positive for rectal pain. Negative for abdominal distention, abdominal pain, anal bleeding and blood in stool.   Endocrine: Negative for cold intolerance, heat intolerance, polydipsia and polyphagia.   Genitourinary:  Negative for difficulty urinating, dysuria, flank pain and hematuria.   Musculoskeletal:  Negative for arthralgias, back pain, joint swelling, myalgias, neck pain and neck stiffness.   Skin:  Negative for color change, pallor and wound.   Allergic/Immunologic: Negative for environmental allergies, food allergies and immunocompromised state.   Neurological:  Negative for dizziness, seizures, facial asymmetry, speech difficulty, light-headedness, numbness and headaches.   Hematological:  Negative for adenopathy. Does not bruise/bleed easily.   Psychiatric/Behavioral:  Negative for agitation, behavioral problems, confusion, decreased concentration and sleep disturbance.            Physical Exam  Vitals and nursing note reviewed.   Constitutional:       General: She is not in acute distress.     Appearance: Normal appearance. She is not ill-appearing.   HENT:      Head: Normocephalic and atraumatic.      Nose: No congestion or rhinorrhea.   Eyes:      General: No scleral icterus.     Extraocular Movements: Extraocular movements intact.      Pupils: Pupils are equal, round, and reactive to light.   Cardiovascular:      Rate and Rhythm: Normal rate and regular rhythm.      Pulses: Normal pulses.      Heart sounds: Normal heart sounds. No murmur heard.    No gallop.   Pulmonary:      Effort: Pulmonary effort is normal. No respiratory distress.      Breath sounds: Normal breath sounds. No stridor. No wheezing or rhonchi.    Abdominal:      General: Bowel sounds are normal. There is no distension.      Palpations: There is no mass.      Tenderness: There is no abdominal tenderness. There is no guarding.   Musculoskeletal:         General: No swelling, tenderness, deformity or signs of injury. Normal range of motion.      Cervical back: Normal range of motion and neck supple. No rigidity. No muscular tenderness.      Right lower leg: No edema.      Left lower leg: No edema.   Skin:     General: Skin is warm.      Coloration: Skin is not jaundiced or pale.      Findings: No bruising or lesion.   Neurological:      General: No focal deficit present.      Mental Status: She is alert and oriented to person, place, and time.      Cranial Nerves: No cranial nerve deficit.      Sensory: No sensory deficit.      Motor: No weakness.      Gait: Gait normal.   Psychiatric:         Mood and Affect: Mood normal.         Behavior: Behavior normal.         Thought Content: Thought content normal.     Vitals:    12/08/22 0852   BP: 104/71   Pulse: 76   Resp: 16   Temp: 98.2 °F (36.8 °C)   Body surface area is 1.63 meters squared.    IMAGING:          Assessment/Plan:      ECOG 1     1. Rectal Cancer:    == Initially diagnosed in July 2021 as stage IIIA rectal cancer.  She has not followed and not pursued any treatment since then as she is skeptical about chemotherapy and side effects of radiation.  She believes an alternate treatment options which she has likely been doing since that time.  After recent dismissal from [facility] she presents to us for further discussion of treatment and management options.  == I discussed with her that restaging scan would be needed to accurately determine her current cancer stage and based on that would recommend treatment with either palliative or curative intent.  She has neglected rectal cancer over the last 1 year at this time and I am strongly suspecting metastasis is with stage IV disease.  I  discussed with her that this is an Oncology Clinic and we would only talk about standard treatment with chemotherapy plus-minus radiation options.  Patient is more interested interested in alternative treatment discussions and I discussed with her that this is not something we do in this clinic.  She voiced understanding but would like to get her PET scan done and then return to clinic for further discussion.  She is not currently agreeable to chemotherapy but would like to discuss this further once PET scan results are back.  == 7/14/22:  Patient underwent PET scan 07/08/2022 which showed continued thickening of the rectal wall extending into the anal region with increased radiotracer uptake.  This continues to extend over about 6 cm in length.  Increased radiotracer uptake remains present in the area with an SUV of approximately 22.5 compared 24.4 on the previous examination.  Indistinctness of the fat planes posteriorly remain present consistent with extra colonic extension.  There continues to be hypermetabolic lymph nodes in the inguinal region bilaterally short axis diameter is on the order of about 12 mm are present with SUVs on the order of approximately 3.4.  A mildly hypermetabolic lymph node remains present along the left sidewall of the pelvis having short axis diameter of approximately 7 mm today versus 10 mm on the previous examination.  The SUV in this area is approximately 2.1 versus 5.8 on previous exam. Considering her original diagnosis as Stage IIIA with outside oncologist, this PET scan shows it is likely, T4a with extracolonic extension and N1b with 2-3 regional LN involvement. I discussed with her about chemo-XRT  and would like to talk to radiation oncology first before she decides to pursue any treatment. I will hence make referral to Rad-onc in this regard  ==07/21/2022: Patient reports that she would like to pursue chemoradiaton. She has appointment today for radiation evaluation with   Ben. I discussed with Dr. Herndon and if she is candidate for radiation plan is for concurrent XRT with 5-fu. She will need mediport placement. Referral placed.   ==07/28/2022:  Pt here today to discuss upcoming chemotherapy, side effect profile, risk versus benefits, and expected outcomes have been discussed today. All questions and concerns answered and consents have been signed.After discussion with XRT and Dr. Herndon plan is to proceed with concurrent XRT and Xeloda. Xeloda dosage calculated with only 500mg tablets due to patient getting very confused with medications and having difficulty repeating proper dosage back after multiple attempts at teaching. She is poor candidate receive combination of 500mg and 150mg and will likely confuse them and take improper dose. She also reports taking too many to name or remember herbs and supplements which are not on medication list. I instructed her to contact clinic with list to ensure no interactions.   == 8/9/22: tolerating chemo/xrt well, states she is feeling much better since starting treatment. She does have some rectal pressure and pain, taking norco 1-2 times day   == 8/23/22: compliant with chemo/xrt, continues with rectal pain, refill of norco provided today. Labs reviewed from last week, show mild anemia.  == 9/8/22:  Pain better controlled with [place] 10/0325 patient continues to take medication 3 times a day.  Reports radiation to complete on Thursday patient is aware she will discontinue Xeloda after Thursday.  Labs reviewed and patient will return to clinic in 3 weeks to evaluate pain management  == 9/29/22: completed XRT/xeloda on 9/13/22, continues with constipation using miralax daily. Based on NCCN Guidelines we will proceed with Capox for 12-16 weeks. She will need a port placed prior to beginning.  Discussed with Dr Herndon   == 11/07/22:  Continuing with Xelox for total 12-16 weeks. Port placed in the interim.  == 11/29/22: s/p diverting colostomy  with Dr Torre on 11/22/22. She continues with c/o of rectal pain advised to begin MS ER BID as provided by Dr Herndon at last visit and to use pecocet provided by Dr Torre as she is out of [place]. We will plan to refill norco once she is out of her percocet.   Will delay treatment this week as she is recovering from surgery. Advised to hold Xeloda   == 12/8/22: feeling good, pain not well managed but she is again encouraged to start taking MS ER as scheduled. Labs reviewed and she is cleared to start Xelox.     2. Pain Management:    == as above      3. Constipation  Advised to take miralax and senokot   Limit opioid usage      Plan:  Start Xelox today   RTC in 3 weeks for f/up visit with cbc cmp for treatment          Total time spent in counseling and discussion about further management options including relevant lab work, treatment,  prognosis, medications and intended side effects was more than 60 minutes. More than 50% of the time was spent on counseling and coordination of care.  This includes face to face time and non-face to face time preparing to see the patient (eg, review of tests), Obtaining and/or reviewing separately obtained history, Documenting clinical information in the electronic or other health record, Independently interpreting resultsand communicating results to the patient/family/caregiver, or Care coordination.

## 2022-12-08 NOTE — TELEPHONE ENCOUNTER
Called patient and told her she has to call [place] [employer] to [place] in [place] for 2023 or she will lose coverage. Provided her with the phone number and she states she would call this afternoon.    Once she has been approved for coverage for 2023, we can do the prior authorization through [facility] to get her a 30 day supply of pain medication.    Will update Gricelda about the coverage as well.

## 2022-12-12 ENCOUNTER — SPECIALTY PHARMACY (OUTPATIENT)
Dept: PHARMACY | Facility: CLINIC | Age: 62
End: 2022-12-12
Payer: MEDICAID

## 2022-12-13 NOTE — TELEPHONE ENCOUNTER
Incoming call from pt for refill. She started this cycle on 12/8. Next cycles starts on 12/29. Pending call to 12/22

## 2022-12-13 NOTE — TELEPHONE ENCOUNTER
Outgoing call to pt for refill. Unable to LVM    I also tried calling her daughter but was unable to reach her. LVM

## 2022-12-14 ENCOUNTER — EXTERNAL HOME HEALTH (OUTPATIENT)
Dept: HOME HEALTH SERVICES | Facility: HOSPITAL | Age: 62
End: 2022-12-14
Payer: MEDICAID

## 2022-12-16 ENCOUNTER — TELEPHONE (OUTPATIENT)
Dept: SURGERY | Facility: CLINIC | Age: 62
End: 2022-12-16
Payer: MEDICAID

## 2022-12-16 NOTE — TELEPHONE ENCOUNTER
"Pt called stating her stoma was turning brown and not "beefy" red like it was previously. Pt states it is still producing stool. HH nurse instructed pt to make an appt. Pt instructed to go to ER to be evaluated and that Dr. Azar could see her Wednesday 12/21/22 for ER follow up. Pt verbalized understanding and made appt.  "

## 2022-12-16 NOTE — TELEPHONE ENCOUNTER
----- Message from Mary Carrera sent at 12/16/2022  2:49 PM CST -----  Contact: self  Type:  Sooner Apoointment Request    Caller is requesting a sooner appointment.  Caller declined first available appointment listed below.  Caller will not accept being placed on the waitlist and is requesting a message be sent to doctor.  Name of Caller: Deborah(Sister)  When is the first available appointment?  Symptoms: Stoma issue  Would the patient rather a call back or a response via [facility]? Call back  Best Call Back Number: [phone]  Additional Information:

## 2022-12-21 ENCOUNTER — SPECIALTY PHARMACY (OUTPATIENT)
Dept: PHARMACY | Facility: CLINIC | Age: 62
End: 2022-12-21
Payer: MEDICAID

## 2022-12-21 NOTE — TELEPHONE ENCOUNTER
Specialty Pharmacy - Refill Coordination    Specialty Medication Orders Linked to Encounter      Flowsheet Row Most Recent Value   Medication #1 capecitabine (XELODA) 500 MG Tab (Order#[id number], Rx#[id number])            Refill Questions - Documented Responses      Flowsheet Row Most Recent Value   Patient Availability and HIPAA Verification    Does patient want to proceed with activity? Yes   HIPAA/medical authority confirmed? Yes   Relationship to patient of person spoken to? Self   Refill Screening Questions    Changes to allergies? No   Changes to medications? No   New conditions since last clinic visit? No   Unplanned office visit, urgent care, ED, or hospital admission in the last 4 weeks? No   How does patient/caregiver feel medication is working? Good   Financial problems or insurance changes? No   How many doses of your specialty medications were missed in the last 4 weeks? 0   Would patient like to speak to a pharmacist? No   When does the patient need to receive the medication? 12/29/22   Refill Delivery Questions    How will the patient receive the medication? Mail   When does the patient need to receive the medication? 12/29/22   Shipping Address Home   Address in [facility] confirmed and updated if neccessary? Yes   Expected Copay ($) 0   Is the patient able to afford the medication copay? Yes   Payment Method zero copay   Days supply of Refill 21   Supplies needed? No supplies needed   Refill activity completed? Yes   Refill activity plan Refill scheduled   Shipment/Pickup Date: 12/27/22            Current Outpatient Medications   Medication Sig    ALPRAZolam (XANAX) 0.25 MG tablet Take 1 tablet prior to radiological scan for claustrophobia/anxiety    aspirin 81 MG Chew Take 81 mg by mouth.    capecitabine (XELODA) 500 MG Tab Take 3 tablets (1,500 mg total) by mouth 2 (two) times daily Take on days 1-14 of 21 day chemotherapy cycle.    ferrous sulfate 324 mg (65 mg iron) TbEC      HYDROcodone-acetaminophen (NORCO)  mg per tablet Take 1 tablet by mouth every 8 (eight) hours as needed for Pain.    multivitamin capsule     nitroGLYCERIN (NITROSTAT) 0.4 MG SL tablet Place 0.4 mg under the tongue.    ondansetron (ZOFRAN) 8 MG tablet Take 1 tablet (8 mg total) by mouth every 8 (eight) hours as needed for Nausea.    ondansetron (ZOFRAN-ODT) 8 MG TbDL Take 1 tablet (8 mg total) by mouth every 6 (six) hours as needed.    promethazine (PHENERGAN) 25 MG tablet TAKE ONE TABLET BY MOUTH EVERY EIGHT HOURS    turmeric 400 mg Cap Take by mouth.   Last reviewed on 12/8/2022  9:13 AM by Patti Webb NP    Review of patient's allergies indicates:  No Known Allergies Last reviewed on  12/8/2022 9:13 AM by Patti Webb      Tasks added this encounter   1/12/2023 - Refill Call (Auto Added)   Tasks due within next 3 months   No tasks due.     Zara Byrd, PharmD  [facility]  [address] [place] [id number]  Phone: [phone]  Fax: [phone]

## 2022-12-27 ENCOUNTER — TELEPHONE (OUTPATIENT)
Dept: SURGERY | Facility: CLINIC | Age: 62
End: 2022-12-27
Payer: MEDICAID

## 2022-12-27 DIAGNOSIS — C20 RECTAL CANCER: Primary | ICD-10-CM

## 2022-12-27 NOTE — TELEPHONE ENCOUNTER
----- Message from Renee Cid sent at 12/27/2022  2:56 PM CST -----  Regarding: pt advice  Contact: pt  Pt is requesting to speak to nurse regarding an appt. Pt did not want me to schedule the appt. Please call back at [phone]//thank you acc

## 2022-12-27 NOTE — TELEPHONE ENCOUNTER
Pt states she missed her ER f/u appt last week and thought she was r/s for another appt. Pt states she doesn't need to be seen and that her issue has resolved. Pt instructed to call back if anything changes. Pt verbalized understanding.

## 2022-12-28 LAB
ALBUMIN SERPL BCP-MCNC: 3.3 G/DL (ref 3.4–5)
ALBUMIN/GLOBULIN RATIO: 0.85 RATIO (ref 1.1–1.8)
ALP SERPL-CCNC: 64 U/L (ref 46–116)
ALT SERPL W P-5'-P-CCNC: 22 U/L (ref 12–78)
ANION GAP SERPL CALC-SCNC: 4 MMOL/L (ref 3–11)
ANISOCYTOSIS: ABNORMAL
AST SERPL-CCNC: 19 U/L (ref 15–37)
BANDS: 2 % (ref 0–5)
BILIRUB SERPL-MCNC: 0.2 MG/DL (ref 0–1)
BUN SERPL-MCNC: 8 MG/DL (ref 7–18)
BUN/CREAT SERPL: 14.54 RATIO (ref 7–18)
CALCIUM SERPL-MCNC: 9.6 MG/DL (ref 8.8–10.5)
CELLS COUNTED: 100
CHLORIDE SERPL-SCNC: 105 MMOL/L (ref 100–108)
CO2 SERPL-SCNC: 31 MMOL/L (ref 21–32)
CREAT SERPL-MCNC: 0.55 MG/DL (ref 0.55–1.02)
EOSINOPHIL NFR BLD: 1 % (ref 1–3)
ERYTHROCYTE [DISTWIDTH] IN BLOOD BY AUTOMATED COUNT: 18.8 % (ref 12.5–18)
GFR ESTIMATION: > 60
GLOBULIN: 3.9 G/DL (ref 2.3–3.5)
GLUCOSE SERPL-MCNC: 86 MG/DL (ref 70–110)
HCT VFR BLD AUTO: 35.1 % (ref 37–47)
HGB BLD-MCNC: 11.3 G/DL (ref 12–16)
LYMPHOCYTES NFR BLD: 34 % (ref 25–40)
MCH RBC QN AUTO: 29.1 PG (ref 27–31.2)
MCHC RBC AUTO-ENTMCNC: 32.2 G/DL (ref 31.8–35.4)
MCV RBC AUTO: 90.5 FL (ref 80–97)
MONOCYTES NFR BLD: 22 % (ref 1–15)
NEUTROPHILS # BLD AUTO: 1.5 10*3/UL (ref 1.8–7.7)
NEUTROPHILS NFR BLD: 41 % (ref 37–80)
NUCLEATED RED BLOOD CELLS: 0 %
PLATELETS: 301 10*3/UL (ref 142–424)
POTASSIUM SERPL-SCNC: 4.1 MMOL/L (ref 3.6–5.2)
PROT SERPL-MCNC: 7.2 G/DL (ref 6.4–8.2)
RBC # BLD AUTO: 3.88 10*6/UL (ref 4.2–5.4)
SMALL PLATELETS BLD QL SMEAR: ADEQUATE
SODIUM BLD-SCNC: 140 MMOL/L (ref 135–145)
WBC # BLD: 3.4 10*3/UL (ref 4.6–10.2)

## 2022-12-29 ENCOUNTER — OFFICE VISIT (OUTPATIENT)
Dept: HEMATOLOGY/ONCOLOGY | Facility: CLINIC | Age: 62
End: 2022-12-29
Payer: MEDICAID

## 2022-12-29 VITALS
HEART RATE: 85 BPM | DIASTOLIC BLOOD PRESSURE: 85 MMHG | SYSTOLIC BLOOD PRESSURE: 124 MMHG | HEIGHT: 61 IN | OXYGEN SATURATION: 99 % | WEIGHT: 136 LBS | BODY MASS INDEX: 25.68 KG/M2

## 2022-12-29 DIAGNOSIS — D50.9 IRON DEFICIENCY ANEMIA, UNSPECIFIED IRON DEFICIENCY ANEMIA TYPE: ICD-10-CM

## 2022-12-29 DIAGNOSIS — G89.3 CANCER RELATED PAIN: ICD-10-CM

## 2022-12-29 DIAGNOSIS — C20 RECTAL CANCER: Primary | ICD-10-CM

## 2022-12-29 DIAGNOSIS — K59.03 CONSTIPATION DUE TO OPIOID THERAPY: ICD-10-CM

## 2022-12-29 DIAGNOSIS — T40.2X5A CONSTIPATION DUE TO OPIOID THERAPY: ICD-10-CM

## 2022-12-29 DIAGNOSIS — Z93.3 COLOSTOMY PRESENT: ICD-10-CM

## 2022-12-29 PROCEDURE — 3008F PR BODY MASS INDEX (BMI) DOCUMENTED: ICD-10-PCS | Mod: CPTII,S$GLB,, | Performed by: NURSE PRACTITIONER

## 2022-12-29 PROCEDURE — 3008F BODY MASS INDEX DOCD: CPT | Mod: CPTII,S$GLB,, | Performed by: NURSE PRACTITIONER

## 2022-12-29 PROCEDURE — 1159F PR MEDICATION LIST DOCUMENTED IN MEDICAL RECORD: ICD-10-PCS | Mod: CPTII,S$GLB,, | Performed by: NURSE PRACTITIONER

## 2022-12-29 PROCEDURE — 3079F DIAST BP 80-89 MM HG: CPT | Mod: CPTII,S$GLB,, | Performed by: NURSE PRACTITIONER

## 2022-12-29 PROCEDURE — 1159F MED LIST DOCD IN RCRD: CPT | Mod: CPTII,S$GLB,, | Performed by: NURSE PRACTITIONER

## 2022-12-29 PROCEDURE — 3074F SYST BP LT 130 MM HG: CPT | Mod: CPTII,S$GLB,, | Performed by: NURSE PRACTITIONER

## 2022-12-29 PROCEDURE — 99215 PR OFFICE/OUTPT VISIT, EST, LEVL V, 40-54 MIN: ICD-10-PCS | Mod: S$GLB,,, | Performed by: NURSE PRACTITIONER

## 2022-12-29 PROCEDURE — 99215 OFFICE O/P EST HI 40 MIN: CPT | Mod: S$GLB,,, | Performed by: NURSE PRACTITIONER

## 2022-12-29 PROCEDURE — 3074F PR MOST RECENT SYSTOLIC BLOOD PRESSURE < 130 MM HG: ICD-10-PCS | Mod: CPTII,S$GLB,, | Performed by: NURSE PRACTITIONER

## 2022-12-29 PROCEDURE — 3079F PR MOST RECENT DIASTOLIC BLOOD PRESSURE 80-89 MM HG: ICD-10-PCS | Mod: CPTII,S$GLB,, | Performed by: NURSE PRACTITIONER

## 2022-12-29 RX ORDER — MORPHINE SULFATE 15 MG/1
15 TABLET, FILM COATED, EXTENDED RELEASE ORAL 2 TIMES DAILY
Qty: 60 TABLET | Refills: 0 | Status: SHIPPED | OUTPATIENT
Start: 2022-12-29 | End: 2023-02-16 | Stop reason: SDUPTHER

## 2022-12-29 RX ORDER — HEPARIN 100 UNIT/ML
500 SYRINGE INTRAVENOUS
Status: CANCELLED | OUTPATIENT
Start: 2022-12-29

## 2022-12-29 RX ORDER — EPINEPHRINE 0.3 MG/.3ML
0.3 INJECTION SUBCUTANEOUS ONCE AS NEEDED
Status: CANCELLED | OUTPATIENT
Start: 2022-12-29

## 2022-12-29 RX ORDER — DIPHENHYDRAMINE HYDROCHLORIDE 50 MG/ML
50 INJECTION INTRAMUSCULAR; INTRAVENOUS ONCE AS NEEDED
Status: CANCELLED | OUTPATIENT
Start: 2022-12-29

## 2022-12-29 RX ORDER — SODIUM CHLORIDE 0.9 % (FLUSH) 0.9 %
10 SYRINGE (ML) INJECTION
Status: CANCELLED | OUTPATIENT
Start: 2022-12-29

## 2022-12-29 NOTE — PROGRESS NOTES
MEDICAL ONCOLOGY FOLLOW UP CONSULTATION NOTE    Patient ID: Yulissa Munguia is a 62 y.o. female.    Chief Complaint: Rectal cancer    HPI:  Patient is a 61-year-old female who was previously diagnosed with rectal cancer about a year back and was seen and evaluated by [facility] with recommendations for chemo XRT for for presumed rectal cancer stage III A. Patient however was reluctant to get any treatment and did not pursue any medical care since then.  She now was referred by her primary care provider after patient was dismissed from [facility].  She presents to our clinic today with her daughters for further evaluation.  She still does not seem to be interested in pursuing chemo or radiation options but would like to have a discussion about her prognosis.  She also reports having a rectal tube placed recently in [place] but I have no records to suggest that it was done recently and for what indication.  She continues to smoke every day and does not seem to actually believe in her cancer diagnosis.      Social History     Socioeconomic History    Marital status:    Tobacco Use    Smoking status: Every Day     Years: 20.00     Types: Cigarettes    Smokeless tobacco: Never    Tobacco comments:     would be interested in smoking cessation in the future   Substance and Sexual Activity    Alcohol use: Not Currently    Drug use: Not Currently     Past Surgical History:   Procedure Laterality Date    ADENOIDECTOMY       SECTION      CORONARY ANGIOPLASTY WITH STENT PLACEMENT      PORTACATH PLACEMENT      ROBOT-ASSISTED CREATION OF COLOSTOMY USING DA MARTINA XI Left     TONSILLECTOMY      TUBAL LIGATION           Review of systems:  Review of Systems   Constitutional:  Positive for activity change and appetite change. Negative for chills, diaphoresis, fatigue and unexpected weight change.   HENT:  Negative for congestion, facial swelling, hearing loss, mouth sores, trouble  swallowing and voice change.    Eyes:  Negative for photophobia, pain, discharge and itching.   Respiratory:  Negative for apnea, cough, choking, chest tightness and shortness of breath.    Cardiovascular:  Negative for chest pain, palpitations and leg swelling.   Gastrointestinal:  Positive for rectal pain. Negative for abdominal distention, abdominal pain, anal bleeding and blood in stool.   Endocrine: Negative for cold intolerance, heat intolerance, polydipsia and polyphagia.   Genitourinary:  Negative for difficulty urinating, dysuria, flank pain and hematuria.   Musculoskeletal:  Negative for arthralgias, back pain, joint swelling, myalgias, neck pain and neck stiffness.   Skin:  Negative for color change, pallor and wound.   Allergic/Immunologic: Negative for environmental allergies, food allergies and immunocompromised state.   Neurological:  Negative for dizziness, seizures, facial asymmetry, speech difficulty, light-headedness, numbness and headaches.   Hematological:  Negative for adenopathy. Does not bruise/bleed easily.   Psychiatric/Behavioral:  Negative for agitation, behavioral problems, confusion, decreased concentration and sleep disturbance.            Physical Exam  Vitals and nursing note reviewed.   Constitutional:       General: She is not in acute distress.     Appearance: Normal appearance. She is not ill-appearing.   HENT:      Head: Normocephalic and atraumatic.      Nose: No congestion or rhinorrhea.   Eyes:      General: No scleral icterus.     Extraocular Movements: Extraocular movements intact.      Pupils: Pupils are equal, round, and reactive to light.   Cardiovascular:      Rate and Rhythm: Normal rate and regular rhythm.      Pulses: Normal pulses.      Heart sounds: Normal heart sounds. No murmur heard.    No gallop.   Pulmonary:      Effort: Pulmonary effort is normal. No respiratory distress.      Breath sounds: Normal breath sounds. No stridor. No wheezing or rhonchi.    Abdominal:      General: Bowel sounds are normal. There is no distension.      Palpations: There is no mass.      Tenderness: There is no abdominal tenderness. There is no guarding.   Musculoskeletal:         General: No swelling, tenderness, deformity or signs of injury. Normal range of motion.      Cervical back: Normal range of motion and neck supple. No rigidity. No muscular tenderness.      Right lower leg: No edema.      Left lower leg: No edema.   Skin:     General: Skin is warm.      Coloration: Skin is not jaundiced or pale.      Findings: No bruising or lesion.   Neurological:      General: No focal deficit present.      Mental Status: She is alert and oriented to person, place, and time.      Cranial Nerves: No cranial nerve deficit.      Sensory: No sensory deficit.      Motor: No weakness.      Gait: Gait normal.   Psychiatric:         Mood and Affect: Mood normal.         Behavior: Behavior normal.         Thought Content: Thought content normal.     There were no vitals filed for this visit.  There is no height or weight on file to calculate BSA.    IMAGING:          Assessment/Plan:      ECOG 1     1. Rectal Cancer:    == Initially diagnosed in July 2021 as stage IIIA rectal cancer.  She has not followed and not pursued any treatment since then as she is skeptical about chemotherapy and side effects of radiation.  She believes an alternate treatment options which she has likely been doing since that time.  After recent dismissal from [facility] she presents to us for further discussion of treatment and management options.  == I discussed with her that restaging scan would be needed to accurately determine her current cancer stage and based on that would recommend treatment with either palliative or curative intent.  She has neglected rectal cancer over the last 1 year at this time and I am strongly suspecting metastasis is with stage IV disease.  I discussed with her that this is an  Oncology Clinic and we would only talk about standard treatment with chemotherapy plus-minus radiation options.  Patient is more interested interested in alternative treatment discussions and I discussed with her that this is not something we do in this clinic.  She voiced understanding but would like to get her PET scan done and then return to clinic for further discussion.  She is not currently agreeable to chemotherapy but would like to discuss this further once PET scan results are back.  == 7/14/22:  Patient underwent PET scan 07/08/2022 which showed continued thickening of the rectal wall extending into the anal region with increased radiotracer uptake.  This continues to extend over about 6 cm in length.  Increased radiotracer uptake remains present in the area with an SUV of approximately 22.5 compared 24.4 on the previous examination.  Indistinctness of the fat planes posteriorly remain present consistent with extra colonic extension.  There continues to be hypermetabolic lymph nodes in the inguinal region bilaterally short axis diameter is on the order of about 12 mm are present with SUVs on the order of approximately 3.4.  A mildly hypermetabolic lymph node remains present along the left sidewall of the pelvis having short axis diameter of approximately 7 mm today versus 10 mm on the previous examination.  The SUV in this area is approximately 2.1 versus 5.8 on previous exam. Considering her original diagnosis as Stage IIIA with outside oncologist, this PET scan shows it is likely, T4a with extracolonic extension and N1b with 2-3 regional LN involvement. I discussed with her about chemo-XRT  and would like to talk to radiation oncology first before she decides to pursue any treatment. I will hence make referral to Rad-onc in this regard  ==07/21/2022: Patient reports that she would like to pursue chemoradiaton. She has appointment today for radiation evaluation with Dr. Contreras. I discussed with Dr. Herndon  and if she is candidate for radiation plan is for concurrent XRT with 5-fu. She will need mediport placement. Referral placed.   ==07/28/2022:  Pt here today to discuss upcoming chemotherapy, side effect profile, risk versus benefits, and expected outcomes have been discussed today. All questions and concerns answered and consents have been signed.After discussion with JACINTO and Dr. Herndon plan is to proceed with concurrent XRT and Xeloda. Xeloda dosage calculated with only 500mg tablets due to patient getting very confused with medications and having difficulty repeating proper dosage back after multiple attempts at teaching. She is poor candidate receive combination of 500mg and 150mg and will likely confuse them and take improper dose. She also reports taking too many to name or remember herbs and supplements which are not on medication list. I instructed her to contact clinic with list to ensure no interactions.   == 8/9/22: tolerating chemo/xrt well, states she is feeling much better since starting treatment. She does have some rectal pressure and pain, taking norco 1-2 times day   == 8/23/22: compliant with chemo/xrt, continues with rectal pain, refill of norco provided today. Labs reviewed from last week, show mild anemia.  == 9/8/22:  Pain better controlled with [place] 10/0325 patient continues to take medication 3 times a day.  Reports radiation to complete on Thursday patient is aware she will discontinue Xeloda after Thursday.  Labs reviewed and patient will return to clinic in 3 weeks to evaluate pain management  == 9/29/22: completed XRT/xeloda on 9/13/22, continues with constipation using miralax daily. Based on NCCN Guidelines we will proceed with Capox for 12-16 weeks. She will need a port placed prior to beginning.  Discussed with Dr Herndon   == 11/07/22:  Continuing with Xelox for total 12-16 weeks. Port placed in the interim.  == 11/29/22: s/p diverting colostomy with Dr Torre on 11/22/22. She  continues with c/o of rectal pain advised to begin MS ER BID as provided by Dr Herndon at last visit and to use pecocet provided by Dr Torre as she is out of [place]. We will plan to refill norco once she is out of her percocet.   Will delay treatment this week as she is recovering from surgery. Advised to hold Xeloda   == 12/8/22: feeling good, pain not well managed but she is again encouraged to start taking MS ER as scheduled. Labs reviewed and she is cleared to start Xelox.   ==12/29/2022: Patient reports pain well managed, she is taking MSER q 12 hours and hydrocodone prn. Rates pain 1/10.  Refilled MSER.  Chemotherapy well tolerated, educated on cold precautions with Oxaliplatin.  No complaints, will continue current treatment. Labs reviewed and pt is cleared for chemotherapy. ANC 1.5    2. Pain Management:  ==MSER 15 mg po q 12 hours - refilled 12/29/2022  ==Hydrocodone 10/325 prn for breakthrough pain filled 12/7/2022  == as above      3. Constipation  Advised to take miralax and senokot     4. Iron deficiency anemia  ==Continue po iron supplementation        Plan:  Continue XELOX  RTC in 3 weeks for f/up visit with cbc cmp for treatment          Total time spent in counseling and discussion about further management options including relevant lab work, treatment,  prognosis, medications and intended side effects was more than 60 minutes. More than 50% of the time was spent on counseling and coordination of care.  This includes face to face time and non-face to face time preparing to see the patient (eg, review of tests), Obtaining and/or reviewing separately obtained history, Documenting clinical information in the electronic or other health record, Independently interpreting resultsand communicating results to the patient/family/caregiver, or Care coordination.

## 2023-01-03 ENCOUNTER — TELEPHONE (OUTPATIENT)
Dept: HEMATOLOGY/ONCOLOGY | Facility: CLINIC | Age: 63
End: 2023-01-03
Payer: MEDICAID

## 2023-01-03 DIAGNOSIS — R11.2 CINV (CHEMOTHERAPY-INDUCED NAUSEA AND VOMITING): ICD-10-CM

## 2023-01-03 DIAGNOSIS — T45.1X5A CINV (CHEMOTHERAPY-INDUCED NAUSEA AND VOMITING): ICD-10-CM

## 2023-01-03 RX ORDER — ONDANSETRON 8 MG/1
8 TABLET, ORALLY DISINTEGRATING ORAL EVERY 6 HOURS PRN
Qty: 30 TABLET | Refills: 3 | Status: SHIPPED | OUTPATIENT
Start: 2023-01-03 | End: 2024-01-03

## 2023-01-05 ENCOUNTER — TELEPHONE (OUTPATIENT)
Dept: SURGERY | Facility: CLINIC | Age: 63
End: 2023-01-05
Payer: MEDICAID

## 2023-01-05 NOTE — TELEPHONE ENCOUNTER
Pt's daughter called stating pt was having abd pain and vomiting x2-3 days - colostomy still producing stool and gas. MD notified and gave following orders: Go to ER. TORB. Pt's daughter notified. Made pt ER f/u visit to see Dr. Diamond's next week.

## 2023-01-11 ENCOUNTER — OFFICE VISIT (OUTPATIENT)
Dept: SURGERY | Facility: CLINIC | Age: 63
End: 2023-01-11
Payer: MEDICAID

## 2023-01-11 VITALS — WEIGHT: 134 LBS | HEIGHT: 61 IN | BODY MASS INDEX: 25.3 KG/M2

## 2023-01-11 DIAGNOSIS — C20 RECTAL CANCER: Primary | ICD-10-CM

## 2023-01-11 PROCEDURE — 1160F PR REVIEW ALL MEDS BY PRESCRIBER/CLIN PHARMACIST DOCUMENTED: ICD-10-PCS | Mod: CPTII,S$GLB,, | Performed by: SURGERY

## 2023-01-11 PROCEDURE — 99024 POSTOP FOLLOW-UP VISIT: CPT | Mod: S$GLB,,, | Performed by: SURGERY

## 2023-01-11 PROCEDURE — 3008F PR BODY MASS INDEX (BMI) DOCUMENTED: ICD-10-PCS | Mod: CPTII,S$GLB,, | Performed by: SURGERY

## 2023-01-11 PROCEDURE — 1159F PR MEDICATION LIST DOCUMENTED IN MEDICAL RECORD: ICD-10-PCS | Mod: CPTII,S$GLB,, | Performed by: SURGERY

## 2023-01-11 PROCEDURE — 99024 PR POST-OP FOLLOW-UP VISIT: ICD-10-PCS | Mod: S$GLB,,, | Performed by: SURGERY

## 2023-01-11 PROCEDURE — 3008F BODY MASS INDEX DOCD: CPT | Mod: CPTII,S$GLB,, | Performed by: SURGERY

## 2023-01-11 PROCEDURE — 1160F RVW MEDS BY RX/DR IN RCRD: CPT | Mod: CPTII,S$GLB,, | Performed by: SURGERY

## 2023-01-11 PROCEDURE — 1159F MED LIST DOCD IN RCRD: CPT | Mod: CPTII,S$GLB,, | Performed by: SURGERY

## 2023-01-11 NOTE — PROGRESS NOTES
Subjective:       Patient ID: Yulissa Munguia is a 62 y.o. female.    Chief Complaint: EP-ER f/u for abdominal pain & N/V (Some abdominal cramps, sensation of wanting to have BM)      62-year-old female who is well known to me, patient has rectal cancer and diverting end sigmoid colostomy.  Patient has gone to the emergency room recently with abdominal cramping.  Had a negative work on the ER.  Is here for follow-up.  States that she is been taking stool softeners and MiraLax and her bowel function has been significantly better and no longer having issues with constipation, states she has occasional cramping.  Otherwise is doing well and has no complaints about the colostomy.    Review of Systems   Constitutional:  Negative for chills and fever.   HENT:  Negative for nasal congestion and rhinorrhea.    Eyes:  Negative for discharge and visual disturbance.   Respiratory:  Negative for shortness of breath and wheezing.    Cardiovascular:  Negative for chest pain and palpitations.   Gastrointestinal: Negative.  Negative for abdominal distention, abdominal pain, anal bleeding, blood in stool, constipation, diarrhea, nausea, rectal pain and vomiting.   Endocrine: Negative for cold intolerance and heat intolerance.   Genitourinary:  Negative for difficulty urinating and frequency.   Musculoskeletal:  Negative for back pain and myalgias.   Integumentary:  Negative for pallor and rash.   Neurological:  Negative for dizziness and headaches.   Hematological:  Negative for adenopathy. Does not bruise/bleed easily.   Psychiatric/Behavioral:  Negative for behavioral problems. The patient is not nervous/anxious.        Objective:      Physical Exam  Constitutional:       Appearance: Normal appearance. She is well-developed.   HENT:      Head: Normocephalic and atraumatic.   Eyes:      General: Lids are normal.      Conjunctiva/sclera: Conjunctivae normal.   Neck:      Trachea: Trachea normal.   Cardiovascular:      Rate and  Rhythm: Normal rate and regular rhythm.      Heart sounds: Normal heart sounds.   Pulmonary:      Effort: Pulmonary effort is normal.      Breath sounds: Normal breath sounds.   Abdominal:      General: Bowel sounds are normal. There is no distension.      Palpations: Abdomen is soft.      Tenderness: There is no abdominal tenderness.      Hernia: No hernia is present.          Comments: Left lower quadrant ostomy is pink with soft stool in the bag   Musculoskeletal:      Cervical back: Normal range of motion.   Skin:     General: Skin is warm and dry.   Neurological:      Mental Status: She is alert and oriented to person, place, and time.   Psychiatric:         Speech: Speech normal.         Behavior: Behavior normal. Behavior is cooperative.       Assessment:       Problem List Items Addressed This Visit          Oncology    Rectal cancer - Primary         Plan:       Patient had gone to the emergency room with some generalized abdominal pain, had negative workup.  Today the patient is healing well.  Patient is prone to constipation so make sure that she continues to take her stool softeners, fiber, and MiraLax as needed.  Patient may return whenever the patient is in need of colostomy reversal.

## 2023-01-12 ENCOUNTER — SPECIALTY PHARMACY (OUTPATIENT)
Dept: PHARMACY | Facility: CLINIC | Age: 63
End: 2023-01-12
Payer: MEDICAID

## 2023-01-12 NOTE — TELEPHONE ENCOUNTER
Specialty Pharmacy - Refill Coordination    Specialty Medication Orders Linked to Encounter      Flowsheet Row Most Recent Value   Medication #1 capecitabine (XELODA) 500 MG Tab (Order#[id number], Rx#[id number])            Refill Questions - Documented Responses      Flowsheet Row Most Recent Value   Patient Availability and HIPAA Verification    Does patient want to proceed with activity? Yes   HIPAA/medical authority confirmed? Yes   Relationship to patient of person spoken to? Self   Refill Screening Questions    Changes to allergies? No   Changes to medications? No   New conditions since last clinic visit? No   Unplanned office visit, urgent care, ED, or hospital admission in the last 4 weeks? Yes  [Er visit and UC visit last week due to pain. Patient was given morphine and Norco. xeloda no DDIs]   How does patient/caregiver feel medication is working? Good   Financial problems or insurance changes? No   How many doses of your specialty medications were missed in the last 4 weeks? 3   Why were doses missed? Felt ill or sick  [Patient did not take her medication due to food poisoning.]   Would patient like to speak to a pharmacist? No   When does the patient need to receive the medication? 01/19/23   Refill Delivery Questions    How will the patient receive the medication? Mail   When does the patient need to receive the medication? 01/19/23   Shipping Address Home   Address in [facility] confirmed and updated if neccessary? Yes   Expected Copay ($) 0   Is the patient able to afford the medication copay? Yes   Payment Method zero copay   Days supply of Refill 21   Supplies needed? No supplies needed   Refill activity completed? Yes   Refill activity plan Refill scheduled   Shipment/Pickup Date: 01/17/23            Current Outpatient Medications   Medication Sig    ALPRAZolam (XANAX) 0.25 MG tablet Take 1 tablet prior to radiological scan for claustrophobia/anxiety    aspirin 81 MG Chew Take 81 mg by mouth.     capecitabine (XELODA) 500 MG Tab Take 3 tablets (1,500 mg total) by mouth 2 (two) times daily Take on days 1-14 of 21 day chemotherapy cycle.    ferrous sulfate 324 mg (65 mg iron) TbEC     HYDROcodone-acetaminophen (NORCO)  mg per tablet Take 1 tablet by mouth every 8 (eight) hours as needed for Pain.    morphine (MS CONTIN) 15 MG 12 hr tablet Take 1 tablet (15 mg total) by mouth 2 (two) times daily.    multivitamin capsule     nitroGLYCERIN (NITROSTAT) 0.4 MG SL tablet Place 0.4 mg under the tongue.    ondansetron (ZOFRAN) 8 MG tablet Take 1 tablet (8 mg total) by mouth every 8 (eight) hours as needed for Nausea.    ondansetron (ZOFRAN-ODT) 8 MG TbDL Take 1 tablet (8 mg total) by mouth every 6 (six) hours as needed.    promethazine (PHENERGAN) 25 MG tablet TAKE ONE TABLET BY MOUTH EVERY EIGHT HOURS    turmeric 400 mg Cap Take by mouth.   Last reviewed on 1/12/2023  9:02 AM by Kylie Story PharmD    Review of patient's allergies indicates:  No Known Allergies Last reviewed on  1/11/2023 10:03 AM by Dav Torre      Tasks added this encounter   No tasks added.   Tasks due within next 3 months   4/4/2023 - Clinical - Follow Up Assesement (180 day)  1/12/2023 - Refill Call (Auto Added)     KYLIE STORY PharmD  [facility]  [address] [place] [id number]  Phone: [phone]  Fax: [phone]

## 2023-01-13 DIAGNOSIS — G89.3 CANCER RELATED PAIN: ICD-10-CM

## 2023-01-13 DIAGNOSIS — C20 RECTAL CANCER: Primary | ICD-10-CM

## 2023-01-13 RX ORDER — HYDROCODONE BITARTRATE AND ACETAMINOPHEN 10; 325 MG/1; MG/1
1 TABLET ORAL EVERY 8 HOURS PRN
Qty: 90 TABLET | Refills: 0 | Status: SHIPPED | OUTPATIENT
Start: 2023-01-13 | End: 2023-03-06 | Stop reason: SDUPTHER

## 2023-01-18 LAB
BARBITURATE SCREEN URINE: NEGATIVE
BENZODIAZ UR QL SCN: NEGATIVE
BENZOYLECGONINE, URINE: NEGATIVE
CANNABINOID SCREEN URINE: NEGATIVE
CLARITHRO TITR SBT: NEGATIVE {TITER}
METHADONE UR QL SCN: NEGATIVE
OPIATES UR QL SCN: POSITIVE
PCP UR QL SCN: NEGATIVE
PH, URINE (TOX): 6 (ref 5–8)

## 2023-01-19 ENCOUNTER — OFFICE VISIT (OUTPATIENT)
Dept: HEMATOLOGY/ONCOLOGY | Facility: CLINIC | Age: 63
End: 2023-01-19
Payer: MEDICAID

## 2023-01-19 VITALS
RESPIRATION RATE: 16 BRPM | BODY MASS INDEX: 25.68 KG/M2 | WEIGHT: 136 LBS | TEMPERATURE: 97 F | HEART RATE: 74 BPM | SYSTOLIC BLOOD PRESSURE: 117 MMHG | OXYGEN SATURATION: 98 % | HEIGHT: 61 IN | DIASTOLIC BLOOD PRESSURE: 64 MMHG

## 2023-01-19 DIAGNOSIS — C20 RECTAL CANCER: Primary | ICD-10-CM

## 2023-01-19 DIAGNOSIS — T45.1X5A CHEMOTHERAPY-INDUCED NEUTROPENIA: ICD-10-CM

## 2023-01-19 DIAGNOSIS — D70.1 CHEMOTHERAPY-INDUCED NEUTROPENIA: ICD-10-CM

## 2023-01-19 PROCEDURE — 99214 PR OFFICE/OUTPT VISIT, EST, LEVL IV, 30-39 MIN: ICD-10-PCS | Mod: S$GLB,,, | Performed by: NURSE PRACTITIONER

## 2023-01-19 PROCEDURE — 3078F PR MOST RECENT DIASTOLIC BLOOD PRESSURE < 80 MM HG: ICD-10-PCS | Mod: CPTII,S$GLB,, | Performed by: NURSE PRACTITIONER

## 2023-01-19 PROCEDURE — 1160F PR REVIEW ALL MEDS BY PRESCRIBER/CLIN PHARMACIST DOCUMENTED: ICD-10-PCS | Mod: CPTII,S$GLB,, | Performed by: NURSE PRACTITIONER

## 2023-01-19 PROCEDURE — 1160F RVW MEDS BY RX/DR IN RCRD: CPT | Mod: CPTII,S$GLB,, | Performed by: NURSE PRACTITIONER

## 2023-01-19 PROCEDURE — 3074F SYST BP LT 130 MM HG: CPT | Mod: CPTII,S$GLB,, | Performed by: NURSE PRACTITIONER

## 2023-01-19 PROCEDURE — 99214 OFFICE O/P EST MOD 30 MIN: CPT | Mod: S$GLB,,, | Performed by: NURSE PRACTITIONER

## 2023-01-19 PROCEDURE — 3008F PR BODY MASS INDEX (BMI) DOCUMENTED: ICD-10-PCS | Mod: CPTII,S$GLB,, | Performed by: NURSE PRACTITIONER

## 2023-01-19 PROCEDURE — 3078F DIAST BP <80 MM HG: CPT | Mod: CPTII,S$GLB,, | Performed by: NURSE PRACTITIONER

## 2023-01-19 PROCEDURE — 3008F BODY MASS INDEX DOCD: CPT | Mod: CPTII,S$GLB,, | Performed by: NURSE PRACTITIONER

## 2023-01-19 PROCEDURE — 1159F PR MEDICATION LIST DOCUMENTED IN MEDICAL RECORD: ICD-10-PCS | Mod: CPTII,S$GLB,, | Performed by: NURSE PRACTITIONER

## 2023-01-19 PROCEDURE — 3074F PR MOST RECENT SYSTOLIC BLOOD PRESSURE < 130 MM HG: ICD-10-PCS | Mod: CPTII,S$GLB,, | Performed by: NURSE PRACTITIONER

## 2023-01-19 PROCEDURE — 1159F MED LIST DOCD IN RCRD: CPT | Mod: CPTII,S$GLB,, | Performed by: NURSE PRACTITIONER

## 2023-01-19 RX ORDER — HEPARIN 100 UNIT/ML
500 SYRINGE INTRAVENOUS
Status: CANCELLED | OUTPATIENT
Start: 2023-01-26

## 2023-01-19 RX ORDER — DIPHENHYDRAMINE HYDROCHLORIDE 50 MG/ML
50 INJECTION INTRAMUSCULAR; INTRAVENOUS ONCE AS NEEDED
Status: CANCELLED | OUTPATIENT
Start: 2023-01-26

## 2023-01-19 RX ORDER — SODIUM CHLORIDE 0.9 % (FLUSH) 0.9 %
10 SYRINGE (ML) INJECTION
Status: CANCELLED | OUTPATIENT
Start: 2023-01-26

## 2023-01-19 RX ORDER — EPINEPHRINE 0.3 MG/.3ML
0.3 INJECTION SUBCUTANEOUS ONCE AS NEEDED
Status: CANCELLED | OUTPATIENT
Start: 2023-01-26

## 2023-01-19 NOTE — PROGRESS NOTES
MEDICAL ONCOLOGY FOLLOW UP CONSULTATION NOTE    Patient ID: Yulissa Munguia is a 62 y.o. female.    Chief Complaint: Rectal cancer    HPI:  Patient is a 61-year-old female who was previously diagnosed with rectal cancer about a year back and was seen and evaluated by [facility] group with recommendations for chemo XRT for for presumed rectal cancer stage III A. Patient however was reluctant to get any treatment and did not pursue any medical care since then.  She now was referred by her primary care provider after patient was dismissed from [facility].  She presents to our clinic today with her daughters for further evaluation.  She still does not seem to be interested in pursuing chemo or radiation options but would like to have a discussion about her prognosis.  She also reports having a rectal tube placed recently in [place] but I have no records to suggest that it was done recently and for what indication.  She continues to smoke every day and does not seem to actually believe in her cancer diagnosis.      Social History     Socioeconomic History    Marital status:    Tobacco Use    Smoking status: Every Day     Years: 20.00     Types: Cigarettes    Smokeless tobacco: Never    Tobacco comments:     would be interested in smoking cessation in the future   Substance and Sexual Activity    Alcohol use: Not Currently    Drug use: Not Currently     Past Surgical History:   Procedure Laterality Date    ADENOIDECTOMY       SECTION      CORONARY ANGIOPLASTY WITH STENT PLACEMENT      PORTACATH PLACEMENT      ROBOT-ASSISTED CREATION OF COLOSTOMY USING DA MARTINA XI Left     TONSILLECTOMY      TUBAL LIGATION           Review of systems:  Review of Systems   Constitutional:  Positive for activity change and appetite change. Negative for chills, diaphoresis, fatigue and unexpected weight change.   HENT:  Negative for congestion, facial swelling, hearing loss, mouth sores, trouble  swallowing and voice change.    Eyes:  Negative for photophobia, pain, discharge and itching.   Respiratory:  Negative for apnea, cough, choking, chest tightness and shortness of breath.    Cardiovascular:  Negative for chest pain, palpitations and leg swelling.   Gastrointestinal:  Positive for rectal pain. Negative for abdominal distention, abdominal pain, anal bleeding and blood in stool.   Endocrine: Negative for cold intolerance, heat intolerance, polydipsia and polyphagia.   Genitourinary:  Negative for difficulty urinating, dysuria, flank pain and hematuria.   Musculoskeletal:  Negative for arthralgias, back pain, joint swelling, myalgias, neck pain and neck stiffness.   Skin:  Negative for color change, pallor and wound.   Allergic/Immunologic: Negative for environmental allergies, food allergies and immunocompromised state.   Neurological:  Negative for dizziness, seizures, facial asymmetry, speech difficulty, light-headedness, numbness and headaches.   Hematological:  Negative for adenopathy. Does not bruise/bleed easily.   Psychiatric/Behavioral:  Negative for agitation, behavioral problems, confusion, decreased concentration and sleep disturbance.            Physical Exam  Vitals and nursing note reviewed.   Constitutional:       General: She is not in acute distress.     Appearance: Normal appearance. She is not ill-appearing.   HENT:      Head: Normocephalic and atraumatic.      Nose: No congestion or rhinorrhea.   Eyes:      General: No scleral icterus.     Extraocular Movements: Extraocular movements intact.      Pupils: Pupils are equal, round, and reactive to light.   Cardiovascular:      Rate and Rhythm: Normal rate and regular rhythm.      Pulses: Normal pulses.      Heart sounds: Normal heart sounds. No murmur heard.    No gallop.   Pulmonary:      Effort: Pulmonary effort is normal. No respiratory distress.      Breath sounds: Normal breath sounds. No stridor. No wheezing or rhonchi.    Abdominal:      General: Bowel sounds are normal. There is no distension.      Palpations: There is no mass.      Tenderness: There is no abdominal tenderness. There is no guarding.   Musculoskeletal:         General: No swelling, tenderness, deformity or signs of injury. Normal range of motion.      Cervical back: Normal range of motion and neck supple. No rigidity. No muscular tenderness.      Right lower leg: No edema.      Left lower leg: No edema.   Skin:     General: Skin is warm.      Coloration: Skin is not jaundiced or pale.      Findings: No bruising or lesion.   Neurological:      General: No focal deficit present.      Mental Status: She is alert and oriented to person, place, and time.      Cranial Nerves: No cranial nerve deficit.      Sensory: No sensory deficit.      Motor: No weakness.      Gait: Gait normal.   Psychiatric:         Mood and Affect: Mood normal.         Behavior: Behavior normal.         Thought Content: Thought content normal.     Vitals:    01/19/23 0820   BP: 117/64   Pulse: 74   Resp: 16   Temp: 97.4 °F (36.3 °C)     Body surface area is 1.63 meters squared.    IMAGING:          Assessment/Plan:      ECOG 1     1. Rectal Cancer:    == Initially diagnosed in July 2021 as stage IIIA rectal cancer.  She has not followed and not pursued any treatment since then as she is skeptical about chemotherapy and side effects of radiation.  She believes an alternate treatment options which she has likely been doing since that time.  After recent dismissal from [facility] she presents to us for further discussion of treatment and management options.  == I discussed with her that restaging scan would be needed to accurately determine her current cancer stage and based on that would recommend treatment with either palliative or curative intent.  She has neglected rectal cancer over the last 1 year at this time and I am strongly suspecting metastasis is with stage IV disease.  I  discussed with her that this is an Oncology Clinic and we would only talk about standard treatment with chemotherapy plus-minus radiation options.  Patient is more interested interested in alternative treatment discussions and I discussed with her that this is not something we do in this clinic.  She voiced understanding but would like to get her PET scan done and then return to clinic for further discussion.  She is not currently agreeable to chemotherapy but would like to discuss this further once PET scan results are back.  == 7/14/22:  Patient underwent PET scan 07/08/2022 which showed continued thickening of the rectal wall extending into the anal region with increased radiotracer uptake.  This continues to extend over about 6 cm in length.  Increased radiotracer uptake remains present in the area with an SUV of approximately 22.5 compared 24.4 on the previous examination.  Indistinctness of the fat planes posteriorly remain present consistent with extra colonic extension.  There continues to be hypermetabolic lymph nodes in the inguinal region bilaterally short axis diameter is on the order of about 12 mm are present with SUVs on the order of approximately 3.4.  A mildly hypermetabolic lymph node remains present along the left sidewall of the pelvis having short axis diameter of approximately 7 mm today versus 10 mm on the previous examination.  The SUV in this area is approximately 2.1 versus 5.8 on previous exam. Considering her original diagnosis as Stage IIIA with outside oncologist, this PET scan shows it is likely, T4a with extracolonic extension and N1b with 2-3 regional LN involvement. I discussed with her about chemo-XRT  and would like to talk to radiation oncology first before she decides to pursue any treatment. I will hence make referral to Rad-onc in this regard  ==07/21/2022: Patient reports that she would like to pursue chemoradiaton. She has appointment today for radiation evaluation with   Ben. I discussed with Dr. Herndon and if she is candidate for radiation plan is for concurrent XRT with 5-fu. She will need mediport placement. Referral placed.   ==07/28/2022:  Pt here today to discuss upcoming chemotherapy, side effect profile, risk versus benefits, and expected outcomes have been discussed today. All questions and concerns answered and consents have been signed.After discussion with XRT and Dr. Herndon plan is to proceed with concurrent XRT and Xeloda. Xeloda dosage calculated with only 500mg tablets due to patient getting very confused with medications and having difficulty repeating proper dosage back after multiple attempts at teaching. She is poor candidate receive combination of 500mg and 150mg and will likely confuse them and take improper dose. She also reports taking too many to name or remember herbs and supplements which are not on medication list. I instructed her to contact clinic with list to ensure no interactions.   == 8/9/22: tolerating chemo/xrt well, states she is feeling much better since starting treatment. She does have some rectal pressure and pain, taking norco 1-2 times day   == 8/23/22: compliant with chemo/xrt, continues with rectal pain, refill of norco provided today. Labs reviewed from last week, show mild anemia.  == 9/8/22:  Pain better controlled with [place] 10/0325 patient continues to take medication 3 times a day.  Reports radiation to complete on Thursday patient is aware she will discontinue Xeloda after Thursday.  Labs reviewed and patient will return to clinic in 3 weeks to evaluate pain management  == 9/29/22: completed XRT/xeloda on 9/13/22, continues with constipation using miralax daily. Based on NCCN Guidelines we will proceed with Capox for 12-16 weeks. She will need a port placed prior to beginning.  Discussed with Dr Herndon   == 11/07/22:  Continuing with Xelox for total 12-16 weeks. Port placed in the interim.  == 11/29/22: s/p diverting colostomy  with Dr Torre on 11/22/22. She continues with c/o of rectal pain advised to begin MS ER BID as provided by Dr Herndon at last visit and to use pecocet provided by Dr Torre as she is out of [place]. We will plan to refill norco once she is out of her percocet.   Will delay treatment this week as she is recovering from surgery. Advised to hold Xeloda   == 12/8/22: feeling good, pain not well managed but she is again encouraged to start taking MS ER as scheduled. Labs reviewed and she is cleared to start Xelox.   ==12/29/2022: Patient reports pain well managed, she is taking MSER q 12 hours and hydrocodone prn. Rates pain 1/10.  Refilled MSER.  Chemotherapy well tolerated, educated on cold precautions with Oxaliplatin.  No complaints, will continue current treatment. Labs reviewed and pt is cleared for chemotherapy. ANC 1.5  == 1/19/23: ANC 1.0 Will delay treatment x 1 week.     2. Pain Management:  ==MSER 15 mg po q 12 hours - refilled 12/29/2022  ==Hydrocodone 10/325 prn for breakthrough pain filled 12/7/2022  == as above      3. Constipation  Advised to take miralax and senokot     4. Iron deficiency anemia  ==Continue po iron supplementation        Plan:  Delay XELOX  RTC in 1 weeks for f/up visit with cbc cmp for treatment          Total time spent in counseling and discussion about further management options including relevant lab work, treatment,  prognosis, medications and intended side effects was more than 60 minutes. More than 50% of the time was spent on counseling and coordination of care.  This includes face to face time and non-face to face time preparing to see the patient (eg, review of tests), Obtaining and/or reviewing separately obtained history, Documenting clinical information in the electronic or other health record, Independently interpreting resultsand communicating results to the patient/family/caregiver, or Care coordination.

## 2023-01-23 ENCOUNTER — DOCUMENT SCAN (OUTPATIENT)
Dept: HOME HEALTH SERVICES | Facility: HOSPITAL | Age: 63
End: 2023-01-23
Payer: MEDICAID

## 2023-01-24 DIAGNOSIS — C20 RECTAL CANCER: ICD-10-CM

## 2023-01-25 LAB
ALBUMIN SERPL BCP-MCNC: 3.4 G/DL (ref 3.4–5)
ALBUMIN/GLOBULIN RATIO: 0.87 RATIO (ref 1.1–1.8)
ALP SERPL-CCNC: 64 U/L (ref 46–116)
ALT SERPL W P-5'-P-CCNC: 15 U/L (ref 12–78)
ANION GAP SERPL CALC-SCNC: 7 MMOL/L (ref 3–11)
ANISOCYTOSIS: ABNORMAL
AST SERPL-CCNC: 20 U/L (ref 15–37)
BILIRUB SERPL-MCNC: 0.3 MG/DL (ref 0–1)
BUN SERPL-MCNC: 8 MG/DL (ref 7–18)
BUN/CREAT SERPL: 15.68 RATIO (ref 7–18)
CALCIUM SERPL-MCNC: 9.8 MG/DL (ref 8.8–10.5)
CELLS COUNTED: 100
CHLORIDE SERPL-SCNC: 106 MMOL/L (ref 100–108)
CO2 SERPL-SCNC: 30 MMOL/L (ref 21–32)
CREAT SERPL-MCNC: 0.51 MG/DL (ref 0.55–1.02)
EOSINOPHIL NFR BLD: 3 % (ref 1–3)
ERYTHROCYTE [DISTWIDTH] IN BLOOD BY AUTOMATED COUNT: 19.8 % (ref 12.5–18)
GFR ESTIMATION: > 60
GLOBULIN: 3.9 G/DL (ref 2.3–3.5)
GLUCOSE SERPL-MCNC: 87 MG/DL (ref 70–110)
HCT VFR BLD AUTO: 37.4 % (ref 37–47)
HGB BLD-MCNC: 12.1 G/DL (ref 12–16)
LYMPHOCYTES NFR BLD: 13 % (ref 25–40)
MCH RBC QN AUTO: 29.7 PG (ref 27–31.2)
MCHC RBC AUTO-ENTMCNC: 32.4 G/DL (ref 31.8–35.4)
MCV RBC AUTO: 91.7 FL (ref 80–97)
MONOCYTES NFR BLD: 23 % (ref 1–15)
NEUTROPHILS # BLD AUTO: 2.3 10*3/UL (ref 1.8–7.7)
NEUTROPHILS NFR BLD: 61 % (ref 37–80)
NUCLEATED RED BLOOD CELLS: 0 %
PLATELETS: 207 10*3/UL (ref 142–424)
POTASSIUM SERPL-SCNC: 4.4 MMOL/L (ref 3.6–5.2)
PROT SERPL-MCNC: 7.3 G/DL (ref 6.4–8.2)
RBC # BLD AUTO: 4.08 10*6/UL (ref 4.2–5.4)
SMALL PLATELETS BLD QL SMEAR: NORMAL
SODIUM BLD-SCNC: 143 MMOL/L (ref 135–145)
WBC # BLD: 3.8 10*3/UL (ref 4.6–10.2)

## 2023-01-26 ENCOUNTER — OFFICE VISIT (OUTPATIENT)
Dept: HEMATOLOGY/ONCOLOGY | Facility: CLINIC | Age: 63
End: 2023-01-26
Payer: MEDICAID

## 2023-01-26 VITALS
HEART RATE: 81 BPM | DIASTOLIC BLOOD PRESSURE: 72 MMHG | RESPIRATION RATE: 15 BRPM | OXYGEN SATURATION: 99 % | TEMPERATURE: 98 F | BODY MASS INDEX: 25.86 KG/M2 | SYSTOLIC BLOOD PRESSURE: 110 MMHG | HEIGHT: 61 IN | WEIGHT: 137 LBS

## 2023-01-26 DIAGNOSIS — C20 RECTAL CANCER: Primary | ICD-10-CM

## 2023-01-26 PROCEDURE — 1160F PR REVIEW ALL MEDS BY PRESCRIBER/CLIN PHARMACIST DOCUMENTED: ICD-10-PCS | Mod: CPTII,S$GLB,, | Performed by: NURSE PRACTITIONER

## 2023-01-26 PROCEDURE — 1160F RVW MEDS BY RX/DR IN RCRD: CPT | Mod: CPTII,S$GLB,, | Performed by: NURSE PRACTITIONER

## 2023-01-26 PROCEDURE — 99214 OFFICE O/P EST MOD 30 MIN: CPT | Mod: S$GLB,,, | Performed by: NURSE PRACTITIONER

## 2023-01-26 PROCEDURE — 99214 PR OFFICE/OUTPT VISIT, EST, LEVL IV, 30-39 MIN: ICD-10-PCS | Mod: S$GLB,,, | Performed by: NURSE PRACTITIONER

## 2023-01-26 PROCEDURE — 1159F PR MEDICATION LIST DOCUMENTED IN MEDICAL RECORD: ICD-10-PCS | Mod: CPTII,S$GLB,, | Performed by: NURSE PRACTITIONER

## 2023-01-26 PROCEDURE — 3008F PR BODY MASS INDEX (BMI) DOCUMENTED: ICD-10-PCS | Mod: CPTII,S$GLB,, | Performed by: NURSE PRACTITIONER

## 2023-01-26 PROCEDURE — 3008F BODY MASS INDEX DOCD: CPT | Mod: CPTII,S$GLB,, | Performed by: NURSE PRACTITIONER

## 2023-01-26 PROCEDURE — 3078F PR MOST RECENT DIASTOLIC BLOOD PRESSURE < 80 MM HG: ICD-10-PCS | Mod: CPTII,S$GLB,, | Performed by: NURSE PRACTITIONER

## 2023-01-26 PROCEDURE — 3078F DIAST BP <80 MM HG: CPT | Mod: CPTII,S$GLB,, | Performed by: NURSE PRACTITIONER

## 2023-01-26 PROCEDURE — 3074F PR MOST RECENT SYSTOLIC BLOOD PRESSURE < 130 MM HG: ICD-10-PCS | Mod: CPTII,S$GLB,, | Performed by: NURSE PRACTITIONER

## 2023-01-26 PROCEDURE — 1159F MED LIST DOCD IN RCRD: CPT | Mod: CPTII,S$GLB,, | Performed by: NURSE PRACTITIONER

## 2023-01-26 PROCEDURE — 3074F SYST BP LT 130 MM HG: CPT | Mod: CPTII,S$GLB,, | Performed by: NURSE PRACTITIONER

## 2023-01-26 NOTE — PROGRESS NOTES
MEDICAL ONCOLOGY FOLLOW UP CONSULTATION NOTE    Patient ID: Yulissa Munguia is a 62 y.o. female.    Chief Complaint: Rectal cancer    HPI:  Patient is a 61-year-old female who was previously diagnosed with rectal cancer about a year back and was seen and evaluated by [facility] group with recommendations for chemo XRT for for presumed rectal cancer stage III A. Patient however was reluctant to get any treatment and did not pursue any medical care since then.  She now was referred by her primary care provider after patient was dismissed from [facility].  She presents to our clinic today with her daughters for further evaluation.  She still does not seem to be interested in pursuing chemo or radiation options but would like to have a discussion about her prognosis.  She also reports having a rectal tube placed recently in [place] but I have no records to suggest that it was done recently and for what indication.  She continues to smoke every day and does not seem to actually believe in her cancer diagnosis.      Social History     Socioeconomic History    Marital status:    Tobacco Use    Smoking status: Every Day     Years: 20.00     Types: Cigarettes    Smokeless tobacco: Never    Tobacco comments:     would be interested in smoking cessation in the future   Substance and Sexual Activity    Alcohol use: Not Currently    Drug use: Not Currently     Past Surgical History:   Procedure Laterality Date    ADENOIDECTOMY       SECTION      CORONARY ANGIOPLASTY WITH STENT PLACEMENT      PORTACATH PLACEMENT      ROBOT-ASSISTED CREATION OF COLOSTOMY USING DA MARTINA XI Left     TONSILLECTOMY      TUBAL LIGATION           Review of systems:  Review of Systems   Constitutional:  Positive for activity change and appetite change. Negative for chills, diaphoresis, fatigue and unexpected weight change.   HENT:  Negative for congestion, facial swelling, hearing loss, mouth sores, trouble  swallowing and voice change.    Eyes:  Negative for photophobia, pain, discharge and itching.   Respiratory:  Negative for apnea, cough, choking, chest tightness and shortness of breath.    Cardiovascular:  Negative for chest pain, palpitations and leg swelling.   Gastrointestinal:  Positive for rectal pain. Negative for abdominal distention, abdominal pain, anal bleeding and blood in stool.   Endocrine: Negative for cold intolerance, heat intolerance, polydipsia and polyphagia.   Genitourinary:  Negative for difficulty urinating, dysuria, flank pain and hematuria.   Musculoskeletal:  Negative for arthralgias, back pain, joint swelling, myalgias, neck pain and neck stiffness.   Skin:  Negative for color change, pallor and wound.   Allergic/Immunologic: Negative for environmental allergies, food allergies and immunocompromised state.   Neurological:  Negative for dizziness, seizures, facial asymmetry, speech difficulty, light-headedness, numbness and headaches.   Hematological:  Negative for adenopathy. Does not bruise/bleed easily.   Psychiatric/Behavioral:  Negative for agitation, behavioral problems, confusion, decreased concentration and sleep disturbance.            Physical Exam  Vitals and nursing note reviewed.   Constitutional:       General: She is not in acute distress.     Appearance: Normal appearance. She is not ill-appearing.   HENT:      Head: Normocephalic and atraumatic.      Nose: No congestion or rhinorrhea.   Eyes:      General: No scleral icterus.     Extraocular Movements: Extraocular movements intact.      Pupils: Pupils are equal, round, and reactive to light.   Cardiovascular:      Rate and Rhythm: Normal rate and regular rhythm.      Pulses: Normal pulses.      Heart sounds: Normal heart sounds. No murmur heard.    No gallop.   Pulmonary:      Effort: Pulmonary effort is normal. No respiratory distress.      Breath sounds: Normal breath sounds. No stridor. No wheezing or rhonchi.    Abdominal:      General: Bowel sounds are normal. There is no distension.      Palpations: There is no mass.      Tenderness: There is no abdominal tenderness. There is no guarding.   Musculoskeletal:         General: No swelling, tenderness, deformity or signs of injury. Normal range of motion.      Cervical back: Normal range of motion and neck supple. No rigidity. No muscular tenderness.      Right lower leg: No edema.      Left lower leg: No edema.   Skin:     General: Skin is warm.      Coloration: Skin is not jaundiced or pale.      Findings: No bruising or lesion.   Neurological:      General: No focal deficit present.      Mental Status: She is alert and oriented to person, place, and time.      Cranial Nerves: No cranial nerve deficit.      Sensory: No sensory deficit.      Motor: No weakness.      Gait: Gait normal.   Psychiatric:         Mood and Affect: Mood normal.         Behavior: Behavior normal.         Thought Content: Thought content normal.     Vitals:    01/26/23 0832   BP: 110/72   Pulse: 81   Resp: 15   Temp: 98.2 °F (36.8 °C)     Body surface area is 1.63 meters squared.    IMAGING:          Assessment/Plan:      ECOG 1     1. Rectal Cancer:    == Initially diagnosed in July 2021 as stage IIIA rectal cancer.  She has not followed and not pursued any treatment since then as she is skeptical about chemotherapy and side effects of radiation.  She believes an alternate treatment options which she has likely been doing since that time.  After recent dismissal from [facility] she presents to us for further discussion of treatment and management options.  == I discussed with her that restaging scan would be needed to accurately determine her current cancer stage and based on that would recommend treatment with either palliative or curative intent.  She has neglected rectal cancer over the last 1 year at this time and I am strongly suspecting metastasis is with stage IV disease.  I  discussed with her that this is an Oncology Clinic and we would only talk about standard treatment with chemotherapy plus-minus radiation options.  Patient is more interested interested in alternative treatment discussions and I discussed with her that this is not something we do in this clinic.  She voiced understanding but would like to get her PET scan done and then return to clinic for further discussion.  She is not currently agreeable to chemotherapy but would like to discuss this further once PET scan results are back.  == 7/14/22:  Patient underwent PET scan 07/08/2022 which showed continued thickening of the rectal wall extending into the anal region with increased radiotracer uptake.  This continues to extend over about 6 cm in length.  Increased radiotracer uptake remains present in the area with an SUV of approximately 22.5 compared 24.4 on the previous examination.  Indistinctness of the fat planes posteriorly remain present consistent with extra colonic extension.  There continues to be hypermetabolic lymph nodes in the inguinal region bilaterally short axis diameter is on the order of about 12 mm are present with SUVs on the order of approximately 3.4.  A mildly hypermetabolic lymph node remains present along the left sidewall of the pelvis having short axis diameter of approximately 7 mm today versus 10 mm on the previous examination.  The SUV in this area is approximately 2.1 versus 5.8 on previous exam. Considering her original diagnosis as Stage IIIA with outside oncologist, this PET scan shows it is likely, T4a with extracolonic extension and N1b with 2-3 regional LN involvement. I discussed with her about chemo-XRT  and would like to talk to radiation oncology first before she decides to pursue any treatment. I will hence make referral to Rad-onc in this regard  ==07/21/2022: Patient reports that she would like to pursue chemoradiaton. She has appointment today for radiation evaluation with   Ben. I discussed with Dr. Herndon and if she is candidate for radiation plan is for concurrent XRT with 5-fu. She will need mediport placement. Referral placed.   ==07/28/2022:  Pt here today to discuss upcoming chemotherapy, side effect profile, risk versus benefits, and expected outcomes have been discussed today. All questions and concerns answered and consents have been signed.After discussion with XRT and Dr. Herndon plan is to proceed with concurrent XRT and Xeloda. Xeloda dosage calculated with only 500mg tablets due to patient getting very confused with medications and having difficulty repeating proper dosage back after multiple attempts at teaching. She is poor candidate receive combination of 500mg and 150mg and will likely confuse them and take improper dose. She also reports taking too many to name or remember herbs and supplements which are not on medication list. I instructed her to contact clinic with list to ensure no interactions.   == 8/9/22: tolerating chemo/xrt well, states she is feeling much better since starting treatment. She does have some rectal pressure and pain, taking norco 1-2 times day   == 8/23/22: compliant with chemo/xrt, continues with rectal pain, refill of norco provided today. Labs reviewed from last week, show mild anemia.  == 9/8/22:  Pain better controlled with [place] 10/0325 patient continues to take medication 3 times a day.  Reports radiation to complete on Thursday patient is aware she will discontinue Xeloda after Thursday.  Labs reviewed and patient will return to clinic in 3 weeks to evaluate pain management  == 9/29/22: completed XRT/xeloda on 9/13/22, continues with constipation using miralax daily. Based on NCCN Guidelines we will proceed with Capox for 12-16 weeks. She will need a port placed prior to beginning.  Discussed with Dr Herndon   == 11/07/22:  Continuing with Xelox for total 12-16 weeks. Port placed in the interim.  == 11/29/22: s/p diverting colostomy  with Dr Torre on 11/22/22. She continues with c/o of rectal pain advised to begin MS ER BID as provided by Dr Herndon at last visit and to use pecocet provided by Dr Torre as she is out of [place]. We will plan to refill norco once she is out of her percocet.   Will delay treatment this week as she is recovering from surgery. Advised to hold Xeloda   == 12/8/22: feeling good, pain not well managed but she is again encouraged to start taking MS ER as scheduled. Labs reviewed and she is cleared to start Xelox.   ==12/29/2022: Patient reports pain well managed, she is taking MSER q 12 hours and hydrocodone prn. Rates pain 1/10.  Refilled MSER.  Chemotherapy well tolerated, educated on cold precautions with Oxaliplatin.  No complaints, will continue current treatment. Labs reviewed and pt is cleared for chemotherapy. ANC 1.5  == 1/19/23: ANC 1.0 Will delay treatment x 1 week.   == 1/26/23: ANC has recovered, ok to proceed with treatment as planned    2. Pain Management:  ==MSER 15 mg po q 12 hours - refilled 12/29/2022  ==Hydrocodone 10/325 prn for breakthrough pain filled 12/7/2022  == as above      3. Constipation  Advised to take miralax and senokot     4. Iron deficiency anemia  ==Continue po iron supplementation        Plan:  Proceed with XELOX  RTC in 3 weeks for f/up visit with cbc cmp  cea for treatment          Total time spent in counseling and discussion about further management options including relevant lab work, treatment,  prognosis, medications and intended side effects was more than 60 minutes. More than 50% of the time was spent on counseling and coordination of care.  This includes face to face time and non-face to face time preparing to see the patient (eg, review of tests), Obtaining and/or reviewing separately obtained history, Documenting clinical information in the electronic or other health record, Independently interpreting resultsand communicating results to the patient/family/caregiver, or Care  coordination.

## 2023-02-02 ENCOUNTER — SPECIALTY PHARMACY (OUTPATIENT)
Dept: PHARMACY | Facility: CLINIC | Age: 63
End: 2023-02-02
Payer: MEDICAID

## 2023-02-02 NOTE — TELEPHONE ENCOUNTER
Outgoing call to patient to discuss capecitabine shortage and pt reports having whole cycle on hand. Went over capecitabine sig.Take 3 tablets (1,500 mg total) by mouth 2 (two) times daily Take on days 1-14 of 21 day chemotherapy cycle.    Pt started cycle 2/1 and has enough for the whole cycle. Pt was not sure if cycle held. Per providers note cycle started 1/26.     Will let providers office know about back order and about place in cycle.

## 2023-02-10 DIAGNOSIS — C20 RECTAL CANCER: Primary | ICD-10-CM

## 2023-02-15 LAB
ALBUMIN SERPL BCP-MCNC: 3.2 G/DL (ref 3.4–5)
ALBUMIN/GLOBULIN RATIO: 0.86 RATIO (ref 1.1–1.8)
ALP SERPL-CCNC: 68 U/L (ref 46–116)
ALT SERPL W P-5'-P-CCNC: 27 U/L (ref 12–78)
ANION GAP SERPL CALC-SCNC: 6 MMOL/L (ref 3–11)
ANISOCYTOSIS: ABNORMAL
AST SERPL-CCNC: 26 U/L (ref 15–37)
BANDS: 2 % (ref 0–5)
BILIRUB SERPL-MCNC: 0.2 MG/DL (ref 0–1)
BUN SERPL-MCNC: 14 MG/DL (ref 7–18)
BUN/CREAT SERPL: 22.58 RATIO (ref 7–18)
CALCIUM SERPL-MCNC: 9.7 MG/DL (ref 8.8–10.5)
CELLS COUNTED: 100
CHLORIDE SERPL-SCNC: 107 MMOL/L (ref 100–108)
CO2 SERPL-SCNC: 30 MMOL/L (ref 21–32)
CREAT SERPL-MCNC: 0.62 MG/DL (ref 0.55–1.02)
EOSINOPHIL NFR BLD: 2 % (ref 1–3)
ERYTHROCYTE [DISTWIDTH] IN BLOOD BY AUTOMATED COUNT: 20.4 % (ref 12.5–18)
GFR ESTIMATION: > 60
GLOBULIN: 3.7 G/DL (ref 2.3–3.5)
GLUCOSE SERPL-MCNC: 63 MG/DL (ref 70–110)
HCT VFR BLD AUTO: 36.4 % (ref 37–47)
HGB BLD-MCNC: 12 G/DL (ref 12–16)
LYMPHOCYTES NFR BLD: 24 % (ref 25–40)
MCH RBC QN AUTO: 30.4 PG (ref 27–31.2)
MCHC RBC AUTO-ENTMCNC: 33 G/DL (ref 31.8–35.4)
MCV RBC AUTO: 92.2 FL (ref 80–97)
MONOCYTES NFR BLD: 19 % (ref 1–15)
NEUTROPHILS # BLD AUTO: 1.8 10*3/UL (ref 1.8–7.7)
NEUTROPHILS NFR BLD: 53 % (ref 37–80)
NUCLEATED RED BLOOD CELLS: 0 %
PLATELETS: 253 10*3/UL (ref 142–424)
POTASSIUM SERPL-SCNC: 4.5 MMOL/L (ref 3.6–5.2)
PROT SERPL-MCNC: 6.9 G/DL (ref 6.4–8.2)
RBC # BLD AUTO: 3.95 10*6/UL (ref 4.2–5.4)
SMALL PLATELETS BLD QL SMEAR: ADEQUATE
SODIUM BLD-SCNC: 143 MMOL/L (ref 135–145)
WBC # BLD: 3.3 10*3/UL (ref 4.6–10.2)

## 2023-02-16 ENCOUNTER — OFFICE VISIT (OUTPATIENT)
Dept: HEMATOLOGY/ONCOLOGY | Facility: CLINIC | Age: 63
End: 2023-02-16
Payer: MEDICAID

## 2023-02-16 VITALS
BODY MASS INDEX: 25.76 KG/M2 | OXYGEN SATURATION: 98 % | WEIGHT: 140 LBS | RESPIRATION RATE: 16 BRPM | TEMPERATURE: 98 F | SYSTOLIC BLOOD PRESSURE: 108 MMHG | HEIGHT: 62 IN | HEART RATE: 86 BPM | DIASTOLIC BLOOD PRESSURE: 73 MMHG

## 2023-02-16 DIAGNOSIS — C20 RECTAL CANCER: Primary | ICD-10-CM

## 2023-02-16 DIAGNOSIS — K59.03 CONSTIPATION DUE TO OPIOID THERAPY: ICD-10-CM

## 2023-02-16 DIAGNOSIS — G89.3 CANCER RELATED PAIN: ICD-10-CM

## 2023-02-16 DIAGNOSIS — T40.2X5A CONSTIPATION DUE TO OPIOID THERAPY: ICD-10-CM

## 2023-02-16 PROCEDURE — 3078F PR MOST RECENT DIASTOLIC BLOOD PRESSURE < 80 MM HG: ICD-10-PCS | Mod: CPTII,S$GLB,, | Performed by: NURSE PRACTITIONER

## 2023-02-16 PROCEDURE — 99215 OFFICE O/P EST HI 40 MIN: CPT | Mod: S$GLB,,, | Performed by: NURSE PRACTITIONER

## 2023-02-16 PROCEDURE — 3078F DIAST BP <80 MM HG: CPT | Mod: CPTII,S$GLB,, | Performed by: NURSE PRACTITIONER

## 2023-02-16 PROCEDURE — 3074F SYST BP LT 130 MM HG: CPT | Mod: CPTII,S$GLB,, | Performed by: NURSE PRACTITIONER

## 2023-02-16 PROCEDURE — 1159F MED LIST DOCD IN RCRD: CPT | Mod: CPTII,S$GLB,, | Performed by: NURSE PRACTITIONER

## 2023-02-16 PROCEDURE — 3008F PR BODY MASS INDEX (BMI) DOCUMENTED: ICD-10-PCS | Mod: CPTII,S$GLB,, | Performed by: NURSE PRACTITIONER

## 2023-02-16 PROCEDURE — 3074F PR MOST RECENT SYSTOLIC BLOOD PRESSURE < 130 MM HG: ICD-10-PCS | Mod: CPTII,S$GLB,, | Performed by: NURSE PRACTITIONER

## 2023-02-16 PROCEDURE — 3008F BODY MASS INDEX DOCD: CPT | Mod: CPTII,S$GLB,, | Performed by: NURSE PRACTITIONER

## 2023-02-16 PROCEDURE — 1159F PR MEDICATION LIST DOCUMENTED IN MEDICAL RECORD: ICD-10-PCS | Mod: CPTII,S$GLB,, | Performed by: NURSE PRACTITIONER

## 2023-02-16 PROCEDURE — 99215 PR OFFICE/OUTPT VISIT, EST, LEVL V, 40-54 MIN: ICD-10-PCS | Mod: S$GLB,,, | Performed by: NURSE PRACTITIONER

## 2023-02-16 RX ORDER — EPINEPHRINE 0.3 MG/.3ML
0.3 INJECTION SUBCUTANEOUS ONCE AS NEEDED
Status: CANCELLED | OUTPATIENT
Start: 2023-02-16

## 2023-02-16 RX ORDER — MORPHINE SULFATE 15 MG/1
15 TABLET, FILM COATED, EXTENDED RELEASE ORAL 2 TIMES DAILY
Qty: 60 TABLET | Refills: 0 | Status: SHIPPED | OUTPATIENT
Start: 2023-02-16 | End: 2023-03-18

## 2023-02-16 RX ORDER — HEPARIN 100 UNIT/ML
500 SYRINGE INTRAVENOUS
Status: CANCELLED | OUTPATIENT
Start: 2023-02-16

## 2023-02-16 RX ORDER — DIPHENHYDRAMINE HYDROCHLORIDE 50 MG/ML
50 INJECTION INTRAMUSCULAR; INTRAVENOUS ONCE AS NEEDED
Status: CANCELLED | OUTPATIENT
Start: 2023-02-16

## 2023-02-16 RX ORDER — SODIUM CHLORIDE 0.9 % (FLUSH) 0.9 %
10 SYRINGE (ML) INJECTION
Status: CANCELLED | OUTPATIENT
Start: 2023-02-16

## 2023-02-16 NOTE — PROGRESS NOTES
MEDICAL ONCOLOGY FOLLOW UP CONSULTATION NOTE    Patient ID: Yulissa Munguia is a 62 y.o. female.    Chief Complaint: Rectal cancer    HPI:  Patient is a 61-year-old female who was previously diagnosed with rectal cancer about a year back and was seen and evaluated by [facility] with recommendations for chemo XRT for for presumed rectal cancer stage III A. Patient however was reluctant to get any treatment and did not pursue any medical care since then.  She now was referred by her primary care provider after patient was dismissed from [facility].  She presents to our clinic today with her daughters for further evaluation.  She still does not seem to be interested in pursuing chemo or radiation options but would like to have a discussion about her prognosis.  She also reports having a rectal tube placed recently in [place] but I have no records to suggest that it was done recently and for what indication.  She continues to smoke every day and does not seem to actually believe in her cancer diagnosis.      Social History     Socioeconomic History    Marital status:    Tobacco Use    Smoking status: Every Day     Years: 20.00     Types: Cigarettes    Smokeless tobacco: Never    Tobacco comments:     would be interested in smoking cessation in the future   Substance and Sexual Activity    Alcohol use: Not Currently    Drug use: Not Currently     Past Surgical History:   Procedure Laterality Date    ADENOIDECTOMY       SECTION      CORONARY ANGIOPLASTY WITH STENT PLACEMENT      PORTACATH PLACEMENT      ROBOT-ASSISTED CREATION OF COLOSTOMY USING DA MARTINA XI Left     TONSILLECTOMY      TUBAL LIGATION           Review of systems:  Review of Systems   Constitutional:  Positive for activity change and appetite change. Negative for chills, diaphoresis, fatigue and unexpected weight change.   HENT:  Negative for congestion, facial swelling, hearing loss, mouth sores, trouble  swallowing and voice change.    Eyes:  Negative for photophobia, pain, discharge and itching.   Respiratory:  Negative for apnea, cough, choking, chest tightness and shortness of breath.    Cardiovascular:  Negative for chest pain, palpitations and leg swelling.   Gastrointestinal:  Positive for rectal pain. Negative for abdominal distention, abdominal pain, anal bleeding and blood in stool.   Endocrine: Negative for cold intolerance, heat intolerance, polydipsia and polyphagia.   Genitourinary:  Negative for difficulty urinating, dysuria, flank pain and hematuria.   Musculoskeletal:  Negative for arthralgias, back pain, joint swelling, myalgias, neck pain and neck stiffness.   Skin:  Negative for color change, pallor and wound.   Allergic/Immunologic: Negative for environmental allergies, food allergies and immunocompromised state.   Neurological:  Negative for dizziness, seizures, facial asymmetry, speech difficulty, light-headedness, numbness and headaches.   Hematological:  Negative for adenopathy. Does not bruise/bleed easily.   Psychiatric/Behavioral:  Negative for agitation, behavioral problems, confusion, decreased concentration and sleep disturbance.            Physical Exam  Vitals and nursing note reviewed.   Constitutional:       General: She is not in acute distress.     Appearance: Normal appearance. She is not ill-appearing.   HENT:      Head: Normocephalic and atraumatic.      Nose: No congestion or rhinorrhea.   Eyes:      General: No scleral icterus.     Extraocular Movements: Extraocular movements intact.      Pupils: Pupils are equal, round, and reactive to light.   Cardiovascular:      Rate and Rhythm: Normal rate and regular rhythm.      Pulses: Normal pulses.      Heart sounds: Normal heart sounds. No murmur heard.    No gallop.   Pulmonary:      Effort: Pulmonary effort is normal. No respiratory distress.      Breath sounds: Normal breath sounds. No stridor. No wheezing or rhonchi.    Abdominal:      General: Bowel sounds are normal. There is no distension.      Palpations: There is no mass.      Tenderness: There is no abdominal tenderness. There is no guarding.   Musculoskeletal:         General: No swelling, tenderness, deformity or signs of injury. Normal range of motion.      Cervical back: Normal range of motion and neck supple. No rigidity. No muscular tenderness.      Right lower leg: No edema.      Left lower leg: No edema.   Skin:     General: Skin is warm.      Coloration: Skin is not jaundiced or pale.      Findings: No bruising or lesion.   Neurological:      General: No focal deficit present.      Mental Status: She is alert and oriented to person, place, and time.      Cranial Nerves: No cranial nerve deficit.      Sensory: No sensory deficit.      Motor: No weakness.      Gait: Gait normal.   Psychiatric:         Mood and Affect: Mood normal.         Behavior: Behavior normal.         Thought Content: Thought content normal.     There were no vitals filed for this visit.    There is no height or weight on file to calculate BSA.    IMAGING:          Assessment/Plan:      ECOG 1     1. Rectal Cancer:    == Initially diagnosed in July 2021 as stage IIIA rectal cancer.  She has not followed and not pursued any treatment since then as she is skeptical about chemotherapy and side effects of radiation.  She believes an alternate treatment options which she has likely been doing since that time.  After recent dismissal from [facility] she presents to us for further discussion of treatment and management options.  == I discussed with her that restaging scan would be needed to accurately determine her current cancer stage and based on that would recommend treatment with either palliative or curative intent.  She has neglected rectal cancer over the last 1 year at this time and I am strongly suspecting metastasis is with stage IV disease.  I discussed with her that this is an  Oncology Clinic and we would only talk about standard treatment with chemotherapy plus-minus radiation options.  Patient is more interested interested in alternative treatment discussions and I discussed with her that this is not something we do in this clinic.  She voiced understanding but would like to get her PET scan done and then return to clinic for further discussion.  She is not currently agreeable to chemotherapy but would like to discuss this further once PET scan results are back.  == 7/14/22:  Patient underwent PET scan 07/08/2022 which showed continued thickening of the rectal wall extending into the anal region with increased radiotracer uptake.  This continues to extend over about 6 cm in length.  Increased radiotracer uptake remains present in the area with an SUV of approximately 22.5 compared 24.4 on the previous examination.  Indistinctness of the fat planes posteriorly remain present consistent with extra colonic extension.  There continues to be hypermetabolic lymph nodes in the inguinal region bilaterally short axis diameter is on the order of about 12 mm are present with SUVs on the order of approximately 3.4.  A mildly hypermetabolic lymph node remains present along the left sidewall of the pelvis having short axis diameter of approximately 7 mm today versus 10 mm on the previous examination.  The SUV in this area is approximately 2.1 versus 5.8 on previous exam. Considering her original diagnosis as Stage IIIA with outside oncologist, this PET scan shows it is likely, T4a with extracolonic extension and N1b with 2-3 regional LN involvement. I discussed with her about chemo-XRT  and would like to talk to radiation oncology first before she decides to pursue any treatment. I will hence make referral to Rad-onc in this regard  ==07/21/2022: Patient reports that she would like to pursue chemoradiaton. She has appointment today for radiation evaluation with Dr. Contreras. I discussed with Dr. Herndon  and if she is candidate for radiation plan is for concurrent XRT with 5-fu. She will need mediport placement. Referral placed.   ==07/28/2022:  Pt here today to discuss upcoming chemotherapy, side effect profile, risk versus benefits, and expected outcomes have been discussed today. All questions and concerns answered and consents have been signed.After discussion with JACINTO and Dr. Herndon plan is to proceed with concurrent XRT and Xeloda. Xeloda dosage calculated with only 500mg tablets due to patient getting very confused with medications and having difficulty repeating proper dosage back after multiple attempts at teaching. She is poor candidate receive combination of 500mg and 150mg and will likely confuse them and take improper dose. She also reports taking too many to name or remember herbs and supplements which are not on medication list. I instructed her to contact clinic with list to ensure no interactions.   == 8/9/22: tolerating chemo/xrt well, states she is feeling much better since starting treatment. She does have some rectal pressure and pain, taking norco 1-2 times day   == 8/23/22: compliant with chemo/xrt, continues with rectal pain, refill of norco provided today. Labs reviewed from last week, show mild anemia.  == 9/8/22:  Pain better controlled with Milfay 10/0325 patient continues to take medication 3 times a day.  Reports radiation to complete on Thursday patient is aware she will discontinue Xeloda after Thursday.  Labs reviewed and patient will return to clinic in 3 weeks to evaluate pain management  == 9/29/22: completed XRT/xeloda on 9/13/22, continues with constipation using miralax daily. Based on NCCN Guidelines we will proceed with Capox for 12-16 weeks. She will need a port placed prior to beginning.  Discussed with Dr Herndon   == 11/07/22:  Continuing with Xelox for total 12-16 weeks. Port placed in the interim.  == 11/29/22: s/p diverting colostomy with Dr Torre on 11/22/22. She  continues with c/o of rectal pain advised to begin MS ER BID as provided by Dr Herndon at last visit and to use pecocet provided by Dr Torre as she is out of [place]. We will plan to refill norco once she is out of her percocet.   Will delay treatment this week as she is recovering from surgery. Advised to hold Xeloda   == 12/8/22: feeling good, pain not well managed but she is again encouraged to start taking MS ER as scheduled. Labs reviewed and she is cleared to start Xelox.   ==12/29/2022: Patient reports pain well managed, she is taking MSER q 12 hours and hydrocodone prn. Rates pain 1/10.  Refilled MSER.  Chemotherapy well tolerated, educated on cold precautions with Oxaliplatin.  No complaints, will continue current treatment. Labs reviewed and pt is cleared for chemotherapy. ANC 1.5  == 1/19/23: ANC 1.0 Will delay treatment x 1 week.   == 1/26/23: ANC has recovered, ok to proceed with treatment as planned  ==02/16/2023: Anc 1.8. Patient tolerating XELOX (CAPEOX) well.  Last scans were 11/3/2023, will order scans for restaging.  Pain well controlled on current regimen MSER 15 mg po bid with hydrocodone 10/325 prn for breakthrough pain. MSER refilled.  Labs reviewed and patient is cleared for chemotherapy.      2. Pain Management:  ==MSER 15 mg po q 12 hours - refilled 02/16/2023  ==Hydrocodone 10/325 prn for breakthrough pain filled 12/7/2022  == as above      3. Constipation  Advised to take miralax and senokot     4. Iron deficiency anemia  ==Continue po iron supplementation        Plan:  Proceed with XELOX  CT Chest abd pelvis ordered  RTC in 3 weeks for f/up visit with cbc cmp  cea for treatment          Total time spent in counseling and discussion about further management options including relevant lab work, treatment,  prognosis, medications and intended side effects was more than 60 minutes. More than 50% of the time was spent on counseling and coordination of care.  This includes face to face time and  non-face to face time preparing to see the patient (eg, review of tests), Obtaining and/or reviewing separately obtained history, Documenting clinical information in the electronic or other health record, Independently interpreting resultsand communicating results to the patient/family/caregiver, or Care coordination.

## 2023-02-17 LAB — CEA SERPL-MCNC: 2.5 NG/ML

## 2023-02-23 ENCOUNTER — TELEPHONE (OUTPATIENT)
Dept: HEMATOLOGY/ONCOLOGY | Facility: CLINIC | Age: 63
End: 2023-02-23
Payer: MEDICAID

## 2023-02-23 NOTE — TELEPHONE ENCOUNTER
Told patient she is getting CT scans for restaging        ----- Message from Vivien Coleman sent at 2/23/2023 11:06 AM CST -----      States she is calling regarding some test that were order. States she didn't know anything about them, she wants to know what's going on. Please call pt [phone]. Thank you

## 2023-03-06 DIAGNOSIS — G89.3 CANCER RELATED PAIN: ICD-10-CM

## 2023-03-07 RX ORDER — HYDROCODONE BITARTRATE AND ACETAMINOPHEN 10; 325 MG/1; MG/1
1 TABLET ORAL EVERY 8 HOURS PRN
Qty: 90 TABLET | Refills: 0 | Status: SHIPPED | OUTPATIENT
Start: 2023-03-07

## 2023-03-09 ENCOUNTER — OFFICE VISIT (OUTPATIENT)
Dept: HEMATOLOGY/ONCOLOGY | Facility: CLINIC | Age: 63
End: 2023-03-09
Payer: MEDICAID

## 2023-03-09 VITALS
WEIGHT: 142 LBS | DIASTOLIC BLOOD PRESSURE: 91 MMHG | SYSTOLIC BLOOD PRESSURE: 149 MMHG | HEART RATE: 62 BPM | TEMPERATURE: 98 F | BODY MASS INDEX: 26.13 KG/M2 | HEIGHT: 62 IN | OXYGEN SATURATION: 98 % | RESPIRATION RATE: 16 BRPM

## 2023-03-09 DIAGNOSIS — C20 RECTAL CANCER: ICD-10-CM

## 2023-03-09 DIAGNOSIS — C20 RECTAL CANCER: Primary | ICD-10-CM

## 2023-03-09 DIAGNOSIS — D70.1 CHEMOTHERAPY-INDUCED NEUTROPENIA: ICD-10-CM

## 2023-03-09 DIAGNOSIS — T45.1X5A CHEMOTHERAPY-INDUCED NEUTROPENIA: ICD-10-CM

## 2023-03-09 PROCEDURE — 3080F DIAST BP >= 90 MM HG: CPT | Mod: CPTII,S$GLB,, | Performed by: NURSE PRACTITIONER

## 2023-03-09 PROCEDURE — 3077F PR MOST RECENT SYSTOLIC BLOOD PRESSURE >= 140 MM HG: ICD-10-PCS | Mod: CPTII,S$GLB,, | Performed by: NURSE PRACTITIONER

## 2023-03-09 PROCEDURE — 99215 OFFICE O/P EST HI 40 MIN: CPT | Mod: S$GLB,,, | Performed by: NURSE PRACTITIONER

## 2023-03-09 PROCEDURE — 3077F SYST BP >= 140 MM HG: CPT | Mod: CPTII,S$GLB,, | Performed by: NURSE PRACTITIONER

## 2023-03-09 PROCEDURE — 1159F MED LIST DOCD IN RCRD: CPT | Mod: CPTII,S$GLB,, | Performed by: NURSE PRACTITIONER

## 2023-03-09 PROCEDURE — 99215 PR OFFICE/OUTPT VISIT, EST, LEVL V, 40-54 MIN: ICD-10-PCS | Mod: S$GLB,,, | Performed by: NURSE PRACTITIONER

## 2023-03-09 PROCEDURE — 3008F BODY MASS INDEX DOCD: CPT | Mod: CPTII,S$GLB,, | Performed by: NURSE PRACTITIONER

## 2023-03-09 PROCEDURE — 3008F PR BODY MASS INDEX (BMI) DOCUMENTED: ICD-10-PCS | Mod: CPTII,S$GLB,, | Performed by: NURSE PRACTITIONER

## 2023-03-09 PROCEDURE — 1160F RVW MEDS BY RX/DR IN RCRD: CPT | Mod: CPTII,S$GLB,, | Performed by: NURSE PRACTITIONER

## 2023-03-09 PROCEDURE — 3080F PR MOST RECENT DIASTOLIC BLOOD PRESSURE >= 90 MM HG: ICD-10-PCS | Mod: CPTII,S$GLB,, | Performed by: NURSE PRACTITIONER

## 2023-03-09 PROCEDURE — 1159F PR MEDICATION LIST DOCUMENTED IN MEDICAL RECORD: ICD-10-PCS | Mod: CPTII,S$GLB,, | Performed by: NURSE PRACTITIONER

## 2023-03-09 PROCEDURE — 1160F PR REVIEW ALL MEDS BY PRESCRIBER/CLIN PHARMACIST DOCUMENTED: ICD-10-PCS | Mod: CPTII,S$GLB,, | Performed by: NURSE PRACTITIONER

## 2023-03-09 RX ORDER — DIPHENHYDRAMINE HYDROCHLORIDE 50 MG/ML
50 INJECTION INTRAMUSCULAR; INTRAVENOUS ONCE AS NEEDED
Status: CANCELLED | OUTPATIENT
Start: 2023-03-16

## 2023-03-09 RX ORDER — SODIUM CHLORIDE 0.9 % (FLUSH) 0.9 %
10 SYRINGE (ML) INJECTION
Status: CANCELLED | OUTPATIENT
Start: 2023-03-16

## 2023-03-09 RX ORDER — HEPARIN 100 UNIT/ML
500 SYRINGE INTRAVENOUS
Status: CANCELLED | OUTPATIENT
Start: 2023-03-16

## 2023-03-09 RX ORDER — EPINEPHRINE 0.3 MG/.3ML
0.3 INJECTION SUBCUTANEOUS ONCE AS NEEDED
Status: CANCELLED | OUTPATIENT
Start: 2023-03-16

## 2023-03-09 NOTE — PROGRESS NOTES
MEDICAL ONCOLOGY FOLLOW UP CONSULTATION NOTE    Patient ID: Yulissa Munguia is a 62 y.o. female.    Chief Complaint: Rectal cancer    HPI:  Patient is a 61-year-old female who was previously diagnosed with rectal cancer about a year back and was seen and evaluated by [facility] with recommendations for chemo XRT for for presumed rectal cancer stage III A. Patient however was reluctant to get any treatment and did not pursue any medical care since then.  She now was referred by her primary care provider after patient was dismissed from [facility].  She presents to our clinic today with her daughters for further evaluation.  She still does not seem to be interested in pursuing chemo or radiation options but would like to have a discussion about her prognosis.  She also reports having a rectal tube placed recently in [place] but I have no records to suggest that it was done recently and for what indication.  She continues to smoke every day and does not seem to actually believe in her cancer diagnosis.      Social History     Socioeconomic History    Marital status:    Tobacco Use    Smoking status: Every Day     Years: 20.00     Types: Cigarettes    Smokeless tobacco: Never    Tobacco comments:     would be interested in smoking cessation in the future   Substance and Sexual Activity    Alcohol use: Not Currently    Drug use: Not Currently     Past Surgical History:   Procedure Laterality Date    ADENOIDECTOMY       SECTION      CORONARY ANGIOPLASTY WITH STENT PLACEMENT      PORTACATH PLACEMENT      ROBOT-ASSISTED CREATION OF COLOSTOMY USING DA MARTINA XI Left     TONSILLECTOMY      TUBAL LIGATION           Review of systems:  Review of Systems   Constitutional:  Positive for activity change and appetite change. Negative for chills, diaphoresis, fatigue and unexpected weight change.   HENT:  Negative for congestion, facial swelling, hearing loss, mouth sores, trouble  swallowing and voice change.    Eyes:  Negative for photophobia, pain, discharge and itching.   Respiratory:  Negative for apnea, cough, choking, chest tightness and shortness of breath.    Cardiovascular:  Negative for chest pain, palpitations and leg swelling.   Gastrointestinal:  Positive for rectal pain. Negative for abdominal distention, abdominal pain, anal bleeding and blood in stool.   Endocrine: Negative for cold intolerance, heat intolerance, polydipsia and polyphagia.   Genitourinary:  Negative for difficulty urinating, dysuria, flank pain and hematuria.   Musculoskeletal:  Negative for arthralgias, back pain, joint swelling, myalgias, neck pain and neck stiffness.   Skin:  Negative for color change, pallor and wound.   Allergic/Immunologic: Negative for environmental allergies, food allergies and immunocompromised state.   Neurological:  Negative for dizziness, seizures, facial asymmetry, speech difficulty, light-headedness, numbness and headaches.   Hematological:  Negative for adenopathy. Does not bruise/bleed easily.   Psychiatric/Behavioral:  Negative for agitation, behavioral problems, confusion, decreased concentration and sleep disturbance.            Physical Exam  Vitals and nursing note reviewed.   Constitutional:       General: She is not in acute distress.     Appearance: Normal appearance. She is not ill-appearing.   HENT:      Head: Normocephalic and atraumatic.      Nose: No congestion or rhinorrhea.   Eyes:      General: No scleral icterus.     Extraocular Movements: Extraocular movements intact.      Pupils: Pupils are equal, round, and reactive to light.   Cardiovascular:      Rate and Rhythm: Normal rate and regular rhythm.      Pulses: Normal pulses.      Heart sounds: Normal heart sounds. No murmur heard.    No gallop.   Pulmonary:      Effort: Pulmonary effort is normal. No respiratory distress.      Breath sounds: Normal breath sounds. No stridor. No wheezing or rhonchi.    Abdominal:      General: Bowel sounds are normal. There is no distension.      Palpations: There is no mass.      Tenderness: There is no abdominal tenderness. There is no guarding.   Musculoskeletal:         General: No swelling, tenderness, deformity or signs of injury. Normal range of motion.      Cervical back: Normal range of motion and neck supple. No rigidity. No muscular tenderness.      Right lower leg: No edema.      Left lower leg: No edema.   Skin:     General: Skin is warm.      Coloration: Skin is not jaundiced or pale.      Findings: No bruising or lesion.   Neurological:      General: No focal deficit present.      Mental Status: She is alert and oriented to person, place, and time.      Cranial Nerves: No cranial nerve deficit.      Sensory: No sensory deficit.      Motor: No weakness.      Gait: Gait normal.   Psychiatric:         Mood and Affect: Mood normal.         Behavior: Behavior normal.         Thought Content: Thought content normal.     Vitals:    03/09/23 0843   BP: (!) 149/91   Pulse: 62   Resp: 16   Temp: 97.6 °F (36.4 °C)       Body surface area is 1.68 meters squared.    IMAGING:          Assessment/Plan:      ECOG 1     1. Rectal Cancer:    == Initially diagnosed in July 2021 as stage IIIA rectal cancer.  She has not followed and not pursued any treatment since then as she is skeptical about chemotherapy and side effects of radiation.  She believes an alternate treatment options which she has likely been doing since that time.  After recent dismissal from [facility] she presents to us for further discussion of treatment and management options.  == I discussed with her that restaging scan would be needed to accurately determine her current cancer stage and based on that would recommend treatment with either palliative or curative intent.  She has neglected rectal cancer over the last 1 year at this time and I am strongly suspecting metastasis is with stage IV disease.   I discussed with her that this is an Oncology Clinic and we would only talk about standard treatment with chemotherapy plus-minus radiation options.  Patient is more interested interested in alternative treatment discussions and I discussed with her that this is not something we do in this clinic.  She voiced understanding but would like to get her PET scan done and then return to clinic for further discussion.  She is not currently agreeable to chemotherapy but would like to discuss this further once PET scan results are back.  == 7/14/22:  Patient underwent PET scan 07/08/2022 which showed continued thickening of the rectal wall extending into the anal region with increased radiotracer uptake.  This continues to extend over about 6 cm in length.  Increased radiotracer uptake remains present in the area with an SUV of approximately 22.5 compared 24.4 on the previous examination.  Indistinctness of the fat planes posteriorly remain present consistent with extra colonic extension.  There continues to be hypermetabolic lymph nodes in the inguinal region bilaterally short axis diameter is on the order of about 12 mm are present with SUVs on the order of approximately 3.4.  A mildly hypermetabolic lymph node remains present along the left sidewall of the pelvis having short axis diameter of approximately 7 mm today versus 10 mm on the previous examination.  The SUV in this area is approximately 2.1 versus 5.8 on previous exam. Considering her original diagnosis as Stage IIIA with outside oncologist, this PET scan shows it is likely, T4a with extracolonic extension and N1b with 2-3 regional LN involvement. I discussed with her about chemo-XRT  and would like to talk to radiation oncology first before she decides to pursue any treatment. I will hence make referral to Rad-onc in this regard  ==07/21/2022: Patient reports that she would like to pursue chemoradiaton. She has appointment today for radiation evaluation with  Dr. Contreras. I discussed with Dr. Herndon and if she is candidate for radiation plan is for concurrent XRT with 5-fu. She will need mediport placement. Referral placed.   ==07/28/2022:  Pt here today to discuss upcoming chemotherapy, side effect profile, risk versus benefits, and expected outcomes have been discussed today. All questions and concerns answered and consents have been signed.After discussion with XRT and Dr. Herndon plan is to proceed with concurrent XRT and Xeloda. Xeloda dosage calculated with only 500mg tablets due to patient getting very confused with medications and having difficulty repeating proper dosage back after multiple attempts at teaching. She is poor candidate receive combination of 500mg and 150mg and will likely confuse them and take improper dose. She also reports taking too many to name or remember herbs and supplements which are not on medication list. I instructed her to contact clinic with list to ensure no interactions.   == 8/9/22: tolerating chemo/xrt well, states she is feeling much better since starting treatment. She does have some rectal pressure and pain, taking norco 1-2 times day   == 8/23/22: compliant with chemo/xrt, continues with rectal pain, refill of norco provided today. Labs reviewed from last week, show mild anemia.  == 9/8/22:  Pain better controlled with [place] 10/0325 patient continues to take medication 3 times a day.  Reports radiation to complete on Thursday patient is aware she will discontinue Xeloda after Thursday.  Labs reviewed and patient will return to clinic in 3 weeks to evaluate pain management  == 9/29/22: completed XRT/xeloda on 9/13/22, continues with constipation using miralax daily. Based on NCCN Guidelines we will proceed with Capox for 12-16 weeks. She will need a port placed prior to beginning.  Discussed with Dr Herndon   == 11/07/22:  Continuing with Xelox for total 12-16 weeks. Port placed in the interim.  == 11/29/22: s/p diverting  colostomy with Dr Torre on 11/22/22. She continues with c/o of rectal pain advised to begin MS ER BID as provided by Dr Herndon at last visit and to use pecocet provided by Dr Torre as she is out of [place]. We will plan to refill norco once she is out of her percocet.   Will delay treatment this week as she is recovering from surgery. Advised to hold Xeloda   == 12/8/22: feeling good, pain not well managed but she is again encouraged to start taking MS ER as scheduled. Labs reviewed and she is cleared to start Xelox.   ==12/29/2022: Patient reports pain well managed, she is taking MSER q 12 hours and hydrocodone prn. Rates pain 1/10.  Refilled MSER.  Chemotherapy well tolerated, educated on cold precautions with Oxaliplatin.  No complaints, will continue current treatment. Labs reviewed and pt is cleared for chemotherapy. ANC 1.5  == 1/19/23: ANC 1.0 Will delay treatment x 1 week.   == 1/26/23: ANC has recovered, ok to proceed with treatment as planned  ==02/16/2023: Anc 1.8. Patient tolerating XELOX (CAPEOX) well.  Last scans were 11/3/2023, will order scans for restaging.  Pain well controlled on current regimen MSER 15 mg po bid with hydrocodone 10/325 prn for breakthrough pain. MSER refilled.  Labs reviewed and patient is cleared for chemotherapy.    ==3/9/23:  Patient doing well today, states she overdid it working in her yd yesterday and has some musculoskeletal back and lower leg pain.  Labs reviewed and ANC is 1.2 will delay treatment for 1 week and have patient return to clinic with labs.  She states her CT scans are scheduled for next Tuesday 03/14/2023    2. Pain Management:  ==MSER 15 mg po q 12 hours - refilled 02/16/2023  ==Hydrocodone 10/325 prn for breakthrough pain filled 12/7/2022  == as above      3. Constipation  Advised to take miralax and senokot     4. Iron deficiency anemia  ==Continue po iron supplementation        Plan:  Hold XELOX for 1 week  CT Chest abd pelvis planned for 314  RTC in 1  weeks for f/up visit with cbc cmp  cea for treatment with CT scans          Total time spent in counseling and discussion about further management options including relevant lab work, treatment,  prognosis, medications and intended side effects was more than 60 minutes. More than 50% of the time was spent on counseling and coordination of care.  This includes face to face time and non-face to face time preparing to see the patient (eg, review of tests), Obtaining and/or reviewing separately obtained history, Documenting clinical information in the electronic or other health record, Independently interpreting resultsand communicating results to the patient/family/caregiver, or Care coordination.

## 2023-03-10 DIAGNOSIS — C20 RECTAL CANCER: Primary | ICD-10-CM

## 2023-03-15 LAB
ALBUMIN SERPL BCP-MCNC: 3.2 G/DL (ref 3.4–5)
ALBUMIN/GLOBULIN RATIO: 0.86 RATIO (ref 1.1–1.8)
ALP SERPL-CCNC: 64 U/L (ref 46–116)
ALT SERPL W P-5'-P-CCNC: 20 U/L (ref 12–78)
ANION GAP SERPL CALC-SCNC: 5 MMOL/L (ref 3–11)
ANISOCYTOSIS: ABNORMAL
AST SERPL-CCNC: 21 U/L (ref 15–37)
BANDS: 2 % (ref 0–5)
BILIRUB SERPL-MCNC: 0.2 MG/DL (ref 0–1)
BUN SERPL-MCNC: 11 MG/DL (ref 7–18)
BUN/CREAT SERPL: 16.17 RATIO (ref 7–18)
CALCIUM SERPL-MCNC: 8.9 MG/DL (ref 8.8–10.5)
CELLS COUNTED: 100
CHLORIDE SERPL-SCNC: 107 MMOL/L (ref 100–108)
CO2 SERPL-SCNC: 33 MMOL/L (ref 21–32)
CREAT SERPL-MCNC: 0.68 MG/DL (ref 0.55–1.02)
EOSINOPHIL NFR BLD: 4 % (ref 1–3)
ERYTHROCYTE [DISTWIDTH] IN BLOOD BY AUTOMATED COUNT: 18.6 % (ref 12.5–18)
GFR ESTIMATION: > 60
GLOBULIN: 3.7 G/DL (ref 2.3–3.5)
GLUCOSE SERPL-MCNC: 98 MG/DL (ref 70–110)
HCT VFR BLD AUTO: 33.7 % (ref 37–47)
HGB BLD-MCNC: 11.3 G/DL (ref 12–16)
LYMPHOCYTES NFR BLD: 28 % (ref 25–40)
MCH RBC QN AUTO: 31.8 PG (ref 27–31.2)
MCHC RBC AUTO-ENTMCNC: 33.5 G/DL (ref 31.8–35.4)
MCV RBC AUTO: 94.9 FL (ref 80–97)
MONOCYTES NFR BLD: 19 % (ref 1–15)
NEUTROPHILS # BLD AUTO: 1.4 10*3/UL (ref 1.8–7.7)
NEUTROPHILS NFR BLD: 47 % (ref 37–80)
NUCLEATED RED BLOOD CELLS: 0 %
PLATELETS: 170 10*3/UL (ref 142–424)
POTASSIUM SERPL-SCNC: 4.4 MMOL/L (ref 3.6–5.2)
PROT SERPL-MCNC: 6.9 G/DL (ref 6.4–8.2)
RBC # BLD AUTO: 3.55 10*6/UL (ref 4.2–5.4)
SMALL PLATELETS BLD QL SMEAR: ADEQUATE
SODIUM BLD-SCNC: 145 MMOL/L (ref 135–145)
WBC # BLD: 2.9 10*3/UL (ref 4.6–10.2)

## 2023-03-16 ENCOUNTER — OFFICE VISIT (OUTPATIENT)
Dept: HEMATOLOGY/ONCOLOGY | Facility: CLINIC | Age: 63
End: 2023-03-16
Payer: MEDICAID

## 2023-03-16 VITALS
OXYGEN SATURATION: 98 % | DIASTOLIC BLOOD PRESSURE: 74 MMHG | WEIGHT: 145 LBS | HEART RATE: 83 BPM | RESPIRATION RATE: 18 BRPM | BODY MASS INDEX: 27.38 KG/M2 | SYSTOLIC BLOOD PRESSURE: 112 MMHG | HEIGHT: 61 IN

## 2023-03-16 DIAGNOSIS — D30.11: Primary | ICD-10-CM

## 2023-03-16 DIAGNOSIS — F17.200 SMOKER: ICD-10-CM

## 2023-03-16 DIAGNOSIS — C20 RECTAL CANCER: ICD-10-CM

## 2023-03-16 PROCEDURE — 1159F PR MEDICATION LIST DOCUMENTED IN MEDICAL RECORD: ICD-10-PCS | Mod: CPTII,S$GLB,, | Performed by: NURSE PRACTITIONER

## 2023-03-16 PROCEDURE — 3008F BODY MASS INDEX DOCD: CPT | Mod: CPTII,S$GLB,, | Performed by: NURSE PRACTITIONER

## 2023-03-16 PROCEDURE — 99215 PR OFFICE/OUTPT VISIT, EST, LEVL V, 40-54 MIN: ICD-10-PCS | Mod: S$GLB,,, | Performed by: NURSE PRACTITIONER

## 2023-03-16 PROCEDURE — 3008F PR BODY MASS INDEX (BMI) DOCUMENTED: ICD-10-PCS | Mod: CPTII,S$GLB,, | Performed by: NURSE PRACTITIONER

## 2023-03-16 PROCEDURE — 3074F PR MOST RECENT SYSTOLIC BLOOD PRESSURE < 130 MM HG: ICD-10-PCS | Mod: CPTII,S$GLB,, | Performed by: NURSE PRACTITIONER

## 2023-03-16 PROCEDURE — 1159F MED LIST DOCD IN RCRD: CPT | Mod: CPTII,S$GLB,, | Performed by: NURSE PRACTITIONER

## 2023-03-16 PROCEDURE — 3074F SYST BP LT 130 MM HG: CPT | Mod: CPTII,S$GLB,, | Performed by: NURSE PRACTITIONER

## 2023-03-16 PROCEDURE — 3078F PR MOST RECENT DIASTOLIC BLOOD PRESSURE < 80 MM HG: ICD-10-PCS | Mod: CPTII,S$GLB,, | Performed by: NURSE PRACTITIONER

## 2023-03-16 PROCEDURE — 99215 OFFICE O/P EST HI 40 MIN: CPT | Mod: S$GLB,,, | Performed by: NURSE PRACTITIONER

## 2023-03-16 PROCEDURE — 3078F DIAST BP <80 MM HG: CPT | Mod: CPTII,S$GLB,, | Performed by: NURSE PRACTITIONER

## 2023-03-16 NOTE — PROGRESS NOTES
MEDICAL ONCOLOGY FOLLOW UP CONSULTATION NOTE    Patient ID: Yulissa Munguia is a 62 y.o. female.    Chief Complaint: Rectal cancer    HPI:  Patient is a 61-year-old female who was previously diagnosed with rectal cancer about a year back and was seen and evaluated by [facility] with recommendations for chemo XRT for for presumed rectal cancer stage III A. Patient however was reluctant to get any treatment and did not pursue any medical care since then.  She now was referred by her primary care provider after patient was dismissed from [facility].  She presents to our clinic today with her daughters for further evaluation.  She still does not seem to be interested in pursuing chemo or radiation options but would like to have a discussion about her prognosis.  She also reports having a rectal tube placed recently in [place] but I have no records to suggest that it was done recently and for what indication.  She continues to smoke every day and does not seem to actually believe in her cancer diagnosis.      Social History     Socioeconomic History    Marital status:    Tobacco Use    Smoking status: Every Day     Years: 20.00     Types: Cigarettes    Smokeless tobacco: Never    Tobacco comments:     would be interested in smoking cessation in the future   Substance and Sexual Activity    Alcohol use: Not Currently    Drug use: Not Currently     Past Surgical History:   Procedure Laterality Date    ADENOIDECTOMY       SECTION      CORONARY ANGIOPLASTY WITH STENT PLACEMENT      PORTACATH PLACEMENT      ROBOT-ASSISTED CREATION OF COLOSTOMY USING DA MARTINA XI Left     TONSILLECTOMY      TUBAL LIGATION           Review of systems:  Review of Systems   Constitutional:  Positive for activity change and appetite change. Negative for chills, diaphoresis, fatigue and unexpected weight change.   HENT:  Negative for congestion, facial swelling, hearing loss, mouth sores, trouble  swallowing and voice change.    Eyes:  Negative for photophobia, pain, discharge and itching.   Respiratory:  Negative for apnea, cough, choking, chest tightness and shortness of breath.    Cardiovascular:  Negative for chest pain, palpitations and leg swelling.   Gastrointestinal:  Positive for rectal pain. Negative for abdominal distention, abdominal pain, anal bleeding and blood in stool.   Endocrine: Negative for cold intolerance, heat intolerance, polydipsia and polyphagia.   Genitourinary:  Negative for difficulty urinating, dysuria, flank pain and hematuria.   Musculoskeletal:  Negative for arthralgias, back pain, joint swelling, myalgias, neck pain and neck stiffness.   Skin:  Negative for color change, pallor and wound.   Allergic/Immunologic: Negative for environmental allergies, food allergies and immunocompromised state.   Neurological:  Negative for dizziness, seizures, facial asymmetry, speech difficulty, light-headedness, numbness and headaches.   Hematological:  Negative for adenopathy. Does not bruise/bleed easily.   Psychiatric/Behavioral:  Negative for agitation, behavioral problems, confusion, decreased concentration and sleep disturbance.            Physical Exam  Vitals and nursing note reviewed.   Constitutional:       General: She is not in acute distress.     Appearance: Normal appearance. She is not ill-appearing.   HENT:      Head: Normocephalic and atraumatic.      Nose: No congestion or rhinorrhea.   Eyes:      General: No scleral icterus.     Extraocular Movements: Extraocular movements intact.      Pupils: Pupils are equal, round, and reactive to light.   Cardiovascular:      Rate and Rhythm: Normal rate and regular rhythm.      Pulses: Normal pulses.      Heart sounds: Normal heart sounds. No murmur heard.    No gallop.   Pulmonary:      Effort: Pulmonary effort is normal. No respiratory distress.      Breath sounds: Normal breath sounds. No stridor. No wheezing or rhonchi.    Abdominal:      General: Bowel sounds are normal. There is no distension.      Palpations: There is no mass.      Tenderness: There is no abdominal tenderness. There is no guarding.   Musculoskeletal:         General: No swelling, tenderness, deformity or signs of injury. Normal range of motion.      Cervical back: Normal range of motion and neck supple. No rigidity. No muscular tenderness.      Right lower leg: No edema.      Left lower leg: No edema.   Skin:     General: Skin is warm.      Coloration: Skin is not jaundiced or pale.      Findings: No bruising or lesion.   Neurological:      General: No focal deficit present.      Mental Status: She is alert and oriented to person, place, and time.      Cranial Nerves: No cranial nerve deficit.      Sensory: No sensory deficit.      Motor: No weakness.      Gait: Gait normal.   Psychiatric:         Mood and Affect: Mood normal.         Behavior: Behavior normal.         Thought Content: Thought content normal.     Vitals:    23 0818   BP: 112/74   Pulse: 83   Resp: 18       Body surface area is 1.68 meters squared.    IMAGING:    [facility] UNKNOWN RAD EAP                                RADIOLOGY REPORT        PT NAME: JEREMY APONTE      [facility]     : 1960 F 62             [address]    ACCT: [id number]                                              [facility] Rec #: [id number]                                        [id number]    Patient Location: [facility].RAD/             Procedure: CHEST THORAX  WO/W CONT    REQUISITION #: [id number]      REPORT #: [id number]           DATE OF EXAM: 23    TIME OF EXAM: 0900       CHEST THORAX  WO/W CONT        INDICATION: MALIGNANT NEOPLASM OF RECTUM            COMPARISON: none    TECHNIQUE: Automated exposure control was utilized.        DISCUSSION:        The lungs are clear.    There is no mediastinal or hilar lymphadenopathy.    The trachea and major bronchi are patent.    There is  no pneumothorax.    There is no pleural effusion.    There is no suspicious bone lesion.        The tip of the left chest port is in the SVC.        IMPRESSION:        No acute or suspicious intrathoracic disease.        Electronically signed by: Mookie Donahue MD (3/14/2023 4:18 PM)        DICTATING PHYSICIAN:  MOOKIE DONAHUE MD                   Date Dictated: 23 1130        Signed By:  MOOKIE DONAHUE MD     Date Signed:  23 1620     CC: WOODROW BECKER NP ; WOODROW BECKER NP      ADMITTING PHYSICIAN:                                                                                                    ORDERING PHY: WOODROW BECKER NP                                                                                                                                                      ATTENDING PHY: WOODROW BECKER NP    Patient Status:  REG CLI    Admit Service Date: 23       CT Abdomen Pelvis  Without Contrast                                RADIOLOGY REPORT        PT NAME: JEREMY APONTE      [facility]     : 1960 F 62             [address]    ACCT: [id number]                                              [facility] Rec #: [id number]                                        [id number]    Patient Location: [facility]RAD/             Procedure: CT ABD   PELVIS WO/W CONTRAST    REQUISITION #: [id number]      REPORT #: [id number]           DATE OF EXAM: 23    TIME OF EXAM: 0930       CT ABD and PELVIS WO/W CONTRAST        Indication:  MALIGNANT NEOPLASM OF RECTUM        Comparison:  none        Technique: Automated exposure control was utilized for dose reduction.        Discussion:        The precontrast CT demonstrates moderate calcified atherosclerosis of the   abdominal aorta and large pelvic arteries.        There is mild diffuse hepatic steatosis.        There is mild post radiation stranding of the soft tissues about the rectum.        There is no  free air, free fluid, or bowel obstruction.        Postcontrast CT demonstrates no suspicious mass of the liver, spleen,   pancreas, or adrenals.  There is no hydronephrosis or bile duct dilation.        There is a questionable 1.4 cm enhancing mass of the right renal pelvis.        There is no lymphadenopathy.  There is no abdominal aortic aneurysm.        The gallbladder is present. The bladder is mildly distended. The   reproductive organs are unremarkable.            There is no suspicious bone lesion.        There is a left lower quadrant colostomy.        The appendix is normal.                IMPRESSION:        Postsurgical and posttreatment change without evidence of active rectal   cancer.        Questionable 1.4 cm enhancing mass of the right renal pelvis. Consider   obtaining an MRI abdomen with and without contrast for further evaluation.         Electronically signed by: Mookie Donahue MD (3/14/2023 4:11 PM)        DICTATING PHYSICIAN:  MOOKIE DONAHUE MD                   Date Dictated: 03/14/23 1130        Signed By:  MOOKIE DONAHUE MD     Date Signed:  03/14/23 1614     CC: WOODROW BECKER NP ; WOODROW BECKER NP      ADMITTING PHYSICIAN:                                                                                                    ORDERING PHY: WOODROW BECKER NP                                                                                                                                                      ATTENDING PHY: WOODROW BECKER NP    Patient Status:  REG CLI    Admit Service Date: 03/14/23                Assessment/Plan:      ECOG 1     1. Rectal Cancer:    == Initially diagnosed in July 2021 as stage IIIA rectal cancer.  She has not followed and not pursued any treatment since then as she is skeptical about chemotherapy and side effects of radiation.  She believes an alternate treatment options which she has likely been doing since that time.  After recent dismissal from  [facility] she presents to us for further discussion of treatment and management options.  == 7/14/22:  Patient underwent PET scan 07/08/2022 which showed continued thickening of the rectal wall extending into the anal region with increased radiotracer uptake.  This continues to extend over about 6 cm in length.  Increased radiotracer uptake remains present in the area with an SUV of approximately 22.5 compared 24.4 on the previous examination.  Indistinctness of the fat planes posteriorly remain present consistent with extra colonic extension.  There continues to be hypermetabolic lymph nodes in the inguinal region bilaterally short axis diameter is on the order of about 12 mm are present with SUVs on the order of approximately 3.4.  A mildly hypermetabolic lymph node remains present along the left sidewall of the pelvis having short axis diameter of approximately 7 mm today versus 10 mm on the previous examination.  The SUV in this area is approximately 2.1 versus 5.8 on previous exam. Considering her original diagnosis as Stage IIIA with outside oncologist, this PET scan shows it is likely, T4a with extracolonic extension and N1b with 2-3 regional LN involvement. I discussed with her about chemo-XRT  and would like to talk to radiation oncology first before she decides to pursue any treatment. I will hence make referral to Rad-onc in this regard  ==07/21/2022: Patient reports that she would like to pursue chemoradiaton. She has appointment today for radiation evaluation with Dr. Contreras. I discussed with Dr. Herndon and if she is candidate for radiation plan is for concurrent XRT with 5-fu. She will need mediport placement. Referral placed.   ==07/28/2022:  Pt here today to discuss upcoming chemotherapy, side effect profile, risk versus benefits, and expected outcomes have been discussed today. All questions and concerns answered and consents have been signed.After discussion with XRT and Dr. Herndon  plan is to proceed with concurrent XRT and Xeloda. Xeloda dosage calculated with only 500mg tablets due to patient getting very confused with medications and having difficulty repeating proper dosage back after multiple attempts at teaching. She is poor candidate receive combination of 500mg and 150mg and will likely confuse them and take improper dose. She also reports taking too many to name or remember herbs and supplements which are not on medication list. I instructed her to contact clinic with list to ensure no interactions.   == 8/2022 - 9/13/22: completed XRT/xeloda. Based on NCCN Guidelines we will proceed with Capox for 12-16 weeks.  == 11/8/22 - 3/16/2023: completed 6 cycles of xelox with diverting colostomy on 11/22/22 with Dr Torre  == 3/16/23:  Here today to review recent CT chest abdomen pelvis showing no evidence of rectal cancer noted.  A questionable 1.6 cm right renal pelvis masses noted recommending MRI abdomen with and without orders have been placed today.  Will refer patient back to Dr. Lomeli for evaluation in [facility]      2. Pain Management:  ==MSER 15 mg po q 12 hours - refilled 02/16/2023  ==Hydrocodone 10/325 prn for breakthrough pain filled 12/7/2022  == as above      3. Constipation  Advised to take miralax and senokot     4. Active smoker  Assistance with smoking cessation was offered, including:  []  Medications  [x]  Counseling  [x]  Printed Information on Smoking Cessation  [x]  Referral to a Smoking Cessation Program    Patient was counseled regarding smoking for >10 minutes.         Plan:  Proceed with last cycle of XELOX as planned for today  MRI abdomen to evaluate right renal pelvis mass  Refer patient to Dr. Lomeli   Return to clinic in 3 weeks          Total time spent in counseling and discussion about further management options including relevant lab work, treatment,  prognosis, medications and intended side effects was more than 60 minutes. More than 50% of the  time was spent on counseling and coordination of care.  This includes face to face time and non-face to face time preparing to see the patient (eg, review of tests), Obtaining and/or reviewing separately obtained history, Documenting clinical information in the electronic or other health record, Independently interpreting resultsand communicating results to the patient/family/caregiver, or Care coordination.

## 2023-03-17 ENCOUNTER — TELEPHONE (OUTPATIENT)
Dept: HEMATOLOGY/ONCOLOGY | Facility: CLINIC | Age: 63
End: 2023-03-17
Payer: MEDICAID

## 2023-03-20 ENCOUNTER — TELEPHONE (OUTPATIENT)
Dept: HEMATOLOGY/ONCOLOGY | Facility: CLINIC | Age: 63
End: 2023-03-20
Payer: MEDICAID

## 2023-03-23 ENCOUNTER — TELEPHONE (OUTPATIENT)
Dept: HEMATOLOGY/ONCOLOGY | Facility: CLINIC | Age: 63
End: 2023-03-23
Payer: MEDICAID

## 2023-03-23 ENCOUNTER — OFFICE VISIT (OUTPATIENT)
Dept: HEMATOLOGY/ONCOLOGY | Facility: CLINIC | Age: 63
End: 2023-03-23
Payer: MEDICAID

## 2023-03-23 VITALS
HEIGHT: 61 IN | OXYGEN SATURATION: 98 % | TEMPERATURE: 98 F | BODY MASS INDEX: 26.81 KG/M2 | RESPIRATION RATE: 16 BRPM | HEART RATE: 100 BPM | DIASTOLIC BLOOD PRESSURE: 66 MMHG | SYSTOLIC BLOOD PRESSURE: 112 MMHG | WEIGHT: 142 LBS

## 2023-03-23 DIAGNOSIS — C20 RECTAL CANCER: Primary | ICD-10-CM

## 2023-03-23 DIAGNOSIS — D30.11: ICD-10-CM

## 2023-03-23 LAB
BUN SERPL-MCNC: 15 MG/DL (ref 7–18)
CREAT SERPL-MCNC: 0.56 MG/DL (ref 0.55–1.02)
GFR ESTIMATION: > 60

## 2023-03-23 PROCEDURE — 3078F DIAST BP <80 MM HG: CPT | Mod: CPTII,S$GLB,, | Performed by: NURSE PRACTITIONER

## 2023-03-23 PROCEDURE — 3074F PR MOST RECENT SYSTOLIC BLOOD PRESSURE < 130 MM HG: ICD-10-PCS | Mod: CPTII,S$GLB,, | Performed by: NURSE PRACTITIONER

## 2023-03-23 PROCEDURE — 3008F BODY MASS INDEX DOCD: CPT | Mod: CPTII,S$GLB,, | Performed by: NURSE PRACTITIONER

## 2023-03-23 PROCEDURE — 3008F PR BODY MASS INDEX (BMI) DOCUMENTED: ICD-10-PCS | Mod: CPTII,S$GLB,, | Performed by: NURSE PRACTITIONER

## 2023-03-23 PROCEDURE — 3074F SYST BP LT 130 MM HG: CPT | Mod: CPTII,S$GLB,, | Performed by: NURSE PRACTITIONER

## 2023-03-23 PROCEDURE — 99215 OFFICE O/P EST HI 40 MIN: CPT | Mod: S$GLB,,, | Performed by: NURSE PRACTITIONER

## 2023-03-23 PROCEDURE — 3078F PR MOST RECENT DIASTOLIC BLOOD PRESSURE < 80 MM HG: ICD-10-PCS | Mod: CPTII,S$GLB,, | Performed by: NURSE PRACTITIONER

## 2023-03-23 PROCEDURE — 99215 PR OFFICE/OUTPT VISIT, EST, LEVL V, 40-54 MIN: ICD-10-PCS | Mod: S$GLB,,, | Performed by: NURSE PRACTITIONER

## 2023-03-23 PROCEDURE — 1159F MED LIST DOCD IN RCRD: CPT | Mod: CPTII,S$GLB,, | Performed by: NURSE PRACTITIONER

## 2023-03-23 PROCEDURE — 1159F PR MEDICATION LIST DOCUMENTED IN MEDICAL RECORD: ICD-10-PCS | Mod: CPTII,S$GLB,, | Performed by: NURSE PRACTITIONER

## 2023-03-23 NOTE — TELEPHONE ENCOUNTER
Discussed patient's care with Dr. Reynaldo Lomeli.  Dr. Lomeli notified our clinic that patient has refused post treatment surgical evaluation.  Stating that she only wants her rectal tube removed.  When Dr. Lomeli explained to her that he was unable to remove tube without surgical resection of previous tumor site patient left clinic angry.    Dr. Lomeli is recommending referral to larger tertiary center for evaluation of rectal surgery as well as possible lower posterior vaginectomy evaluation which is not available in [place] at this time.    I will reach out to patient to discuss further and refer patient to [facility] if she is agreeable.

## 2023-03-23 NOTE — PROGRESS NOTES
MEDICAL ONCOLOGY FOLLOW UP CONSULTATION NOTE    Patient ID: Yulissa Munguia is a 62 y.o. female.    Chief Complaint: Rectal cancer    HPI:  Patient is a 61-year-old female who was previously diagnosed with rectal cancer about a year back and was seen and evaluated by [facility] with recommendations for chemo XRT for for presumed rectal cancer stage III A. Patient however was reluctant to get any treatment and did not pursue any medical care since then.  She now was referred by her primary care provider after patient was dismissed from [facility].  She presents to our clinic today with her daughters for further evaluation.  She still does not seem to be interested in pursuing chemo or radiation options but would like to have a discussion about her prognosis.  She also reports having a rectal tube placed recently in [place] but I have no records to suggest that it was done recently and for what indication.  She continues to smoke every day and does not seem to actually believe in her cancer diagnosis.      Social History     Socioeconomic History    Marital status:    Tobacco Use    Smoking status: Every Day     Years: 20.00     Types: Cigarettes    Smokeless tobacco: Never    Tobacco comments:     would be interested in smoking cessation in the future   Substance and Sexual Activity    Alcohol use: Not Currently    Drug use: Not Currently     Past Surgical History:   Procedure Laterality Date    ADENOIDECTOMY       SECTION      CORONARY ANGIOPLASTY WITH STENT PLACEMENT      PORTACATH PLACEMENT      ROBOT-ASSISTED CREATION OF COLOSTOMY USING DA MARTINA XI Left     TONSILLECTOMY      TUBAL LIGATION           Review of systems:  Review of Systems   Constitutional:  Positive for activity change and appetite change. Negative for chills, diaphoresis, fatigue and unexpected weight change.   HENT:  Negative for congestion, facial swelling, hearing loss, mouth sores, trouble  swallowing and voice change.    Eyes:  Negative for photophobia, pain, discharge and itching.   Respiratory:  Negative for apnea, cough, choking, chest tightness and shortness of breath.    Cardiovascular:  Negative for chest pain, palpitations and leg swelling.   Gastrointestinal:  Positive for rectal pain. Negative for abdominal distention, abdominal pain, anal bleeding and blood in stool.   Endocrine: Negative for cold intolerance, heat intolerance, polydipsia and polyphagia.   Genitourinary:  Negative for difficulty urinating, dysuria, flank pain and hematuria.   Musculoskeletal:  Negative for arthralgias, back pain, joint swelling, myalgias, neck pain and neck stiffness.   Skin:  Negative for color change, pallor and wound.   Allergic/Immunologic: Negative for environmental allergies, food allergies and immunocompromised state.   Neurological:  Negative for dizziness, seizures, facial asymmetry, speech difficulty, light-headedness, numbness and headaches.   Hematological:  Negative for adenopathy. Does not bruise/bleed easily.   Psychiatric/Behavioral:  Negative for agitation, behavioral problems, confusion, decreased concentration and sleep disturbance.            Physical Exam  Vitals and nursing note reviewed.   Constitutional:       General: She is not in acute distress.     Appearance: Normal appearance. She is not ill-appearing.   HENT:      Head: Normocephalic and atraumatic.      Nose: No congestion or rhinorrhea.   Eyes:      General: No scleral icterus.     Extraocular Movements: Extraocular movements intact.      Pupils: Pupils are equal, round, and reactive to light.   Cardiovascular:      Rate and Rhythm: Normal rate and regular rhythm.      Pulses: Normal pulses.      Heart sounds: Normal heart sounds. No murmur heard.    No gallop.   Pulmonary:      Effort: Pulmonary effort is normal. No respiratory distress.      Breath sounds: Normal breath sounds. No stridor. No wheezing or rhonchi.    Abdominal:      General: Bowel sounds are normal. There is no distension.      Palpations: There is no mass.      Tenderness: There is no abdominal tenderness. There is no guarding.   Musculoskeletal:         General: No swelling, tenderness, deformity or signs of injury. Normal range of motion.      Cervical back: Normal range of motion and neck supple. No rigidity. No muscular tenderness.      Right lower leg: No edema.      Left lower leg: No edema.   Skin:     General: Skin is warm.      Coloration: Skin is not jaundiced or pale.      Findings: No bruising or lesion.   Neurological:      General: No focal deficit present.      Mental Status: She is alert and oriented to person, place, and time.      Cranial Nerves: No cranial nerve deficit.      Sensory: No sensory deficit.      Motor: No weakness.      Gait: Gait normal.   Psychiatric:         Mood and Affect: Mood normal.         Behavior: Behavior normal.         Thought Content: Thought content normal.     Vitals:    23 1309   BP: 112/66   Pulse: 100   Resp: 16   Temp: 97.6 °F (36.4 °C)       Body surface area is 1.66 meters squared.    IMAGING:    [facility] UNKNOWN RAD EAP                                RADIOLOGY REPORT        PT NAME: JEREMY APONTE [facility]     : 1960 F 62             [address]    ACCT: [id number]                                              [facility] Rec #: [id number]                                        [id number]    Patient Location: [place].RAD/             Procedure: CHEST THORAX  WO/W CONT    REQUISITION #: [id number]      REPORT #: [id number]           DATE OF EXAM: 23    TIME OF EXAM: 0900       CHEST THORAX  WO/W CONT        INDICATION: MALIGNANT NEOPLASM OF RECTUM            COMPARISON: none    TECHNIQUE: Automated exposure control was utilized.        DISCUSSION:        The lungs are clear.    There is no mediastinal or hilar lymphadenopathy.    The trachea and major  bronchi are patent.    There is no pneumothorax.    There is no pleural effusion.    There is no suspicious bone lesion.        The tip of the left chest port is in the SVC.        IMPRESSION:        No acute or suspicious intrathoracic disease.        Electronically signed by: Mookie Donahue MD (3/14/2023 4:18 PM)        DICTATING PHYSICIAN:  MOOKIE DONAHUE MD                   Date Dictated: 23 1130        Signed By:  MOOKIE DONAHUE MD     Date Signed:  23 1620     CC: WOODROW BECKER NP ; WOODROW BECKER NP      ADMITTING PHYSICIAN:                                                                                                    ORDERING PHY: WOODROW BECKER NP                                                                                                                                                      ATTENDING PHY: WOODROW BECKER NP    Patient Status:  REG CLI    Admit Service Date: 23       CT Abdomen Pelvis  Without Contrast                                RADIOLOGY REPORT        PT NAME: JEREMY APONTE      [facility]     : 1960 F 62             [address]    ACCT: [id number]                                              [facility] Rec #: [id number]                                        [id number]    Patient Location: [place]RAD/             Procedure: CT ABD   PELVIS WO/W CONTRAST    REQUISITION #: [id number]      REPORT #: [id number]           DATE OF EXAM: 23    TIME OF EXAM: 0930       CT ABD and PELVIS WO/W CONTRAST        Indication:  MALIGNANT NEOPLASM OF RECTUM        Comparison:  none        Technique: Automated exposure control was utilized for dose reduction.        Discussion:        The precontrast CT demonstrates moderate calcified atherosclerosis of the   abdominal aorta and large pelvic arteries.        There is mild diffuse hepatic steatosis.        There is mild post radiation stranding of the soft tissues about  the rectum.        There is no free air, free fluid, or bowel obstruction.        Postcontrast CT demonstrates no suspicious mass of the liver, spleen,   pancreas, or adrenals.  There is no hydronephrosis or bile duct dilation.        There is a questionable 1.4 cm enhancing mass of the right renal pelvis.        There is no lymphadenopathy.  There is no abdominal aortic aneurysm.        The gallbladder is present. The bladder is mildly distended. The   reproductive organs are unremarkable.            There is no suspicious bone lesion.        There is a left lower quadrant colostomy.        The appendix is normal.                IMPRESSION:        Postsurgical and posttreatment change without evidence of active rectal   cancer.        Questionable 1.4 cm enhancing mass of the right renal pelvis. Consider   obtaining an MRI abdomen with and without contrast for further evaluation.         Electronically signed by: Mookie Donahue MD (3/14/2023 4:11 PM)        DICTATING PHYSICIAN:  MOOKIE DONAHUE MD                   Date Dictated: 03/14/23 1130        Signed By:  MOOKIE DONAHUE MD     Date Signed:  03/14/23 2001     CC: WOODROW BECKER NP ; WOODROW BECKER NP      ADMITTING PHYSICIAN:                                                                                                    ORDERING PHY: WOODROW BECKER NP                                                                                                                                                      ATTENDING PHY: WOODROW BECKER NP    Patient Status:  REG CLI    Admit Service Date: 03/14/23                Assessment/Plan:      ECOG 1     1. Rectal Cancer:    == Initially diagnosed in July 2021 as stage IIIA rectal cancer.  She has not followed and not pursued any treatment since then as she is skeptical about chemotherapy and side effects of radiation.  She believes an alternate treatment options which she has likely been doing since that time.   After recent dismissal from [facility] she presents to us for further discussion of treatment and management options.  == 7/14/22:  Patient underwent PET scan 07/08/2022 which showed continued thickening of the rectal wall extending into the anal region with increased radiotracer uptake.  This continues to extend over about 6 cm in length.  Increased radiotracer uptake remains present in the area with an SUV of approximately 22.5 compared 24.4 on the previous examination.  Indistinctness of the fat planes posteriorly remain present consistent with extra colonic extension.  There continues to be hypermetabolic lymph nodes in the inguinal region bilaterally short axis diameter is on the order of about 12 mm are present with SUVs on the order of approximately 3.4.  A mildly hypermetabolic lymph node remains present along the left sidewall of the pelvis having short axis diameter of approximately 7 mm today versus 10 mm on the previous examination.  The SUV in this area is approximately 2.1 versus 5.8 on previous exam. Considering her original diagnosis as Stage IIIA with outside oncologist, this PET scan shows it is likely, T4a with extracolonic extension and N1b with 2-3 regional LN involvement. I discussed with her about chemo-XRT  and would like to talk to radiation oncology first before she decides to pursue any treatment. I will hence make referral to Rad-onc in this regard  ==07/21/2022: Patient reports that she would like to pursue chemoradiaton. She has appointment today for radiation evaluation with Dr. Contreras. I discussed with Dr. Herndon and if she is candidate for radiation plan is for concurrent XRT with 5-fu. She will need mediport placement. Referral placed.   ==07/28/2022:  Pt here today to discuss upcoming chemotherapy, side effect profile, risk versus benefits, and expected outcomes have been discussed today. All questions and concerns answered and consents have been signed.After  discussion with XRT and Dr. Herndon plan is to proceed with concurrent XRT and Xeloda. Xeloda dosage calculated with only 500mg tablets due to patient getting very confused with medications and having difficulty repeating proper dosage back after multiple attempts at teaching. She is poor candidate receive combination of 500mg and 150mg and will likely confuse them and take improper dose. She also reports taking too many to name or remember herbs and supplements which are not on medication list. I instructed her to contact clinic with list to ensure no interactions.   == 8/2022 - 9/13/22: completed XRT/xeloda. Based on NCCN Guidelines we will proceed with Capox for 12-16 weeks.  == 11/8/22 - 3/16/2023: completed 6 cycles of xelox with diverting colostomy on 11/22/22 with Dr Torre  == 3/16/23:  Here today to review recent CT chest abdomen pelvis showing no evidence of rectal cancer noted.  A questionable 1.6 cm right renal pelvis masses noted recommending MRI abdomen with and without orders have been placed today.  Will refer patient back to Dr. Lomeli for evaluation in [facility]  == 3/23/23:  Patient walked into clinic today to discuss recent visit with Dr. Lomeli, currently she does not desire colorectal surgery after completion of neoadjuvant chemotherapy.  Today we discussed the importance of surgical resection as completion of therapy related to her diagnoses.  She is most concerned about having her rectal tube removed as it is causing her discomfort.  Explained that we concur with Dr. Lomeli recommended surgical resection prior to rectal tube removal.  She is willing to obtain a 2nd opinion with additional colorectal surgeon to discuss surgical options after her MRI is resulted which is scheduled for Tuesday March 26, 2023 but states she is still hesitant to undergo surgery.    2. Pain Management:  ==MSER 15 mg po q 12 hours - refilled 02/16/2023  ==Hydrocodone 10/325 prn for breakthrough pain filled  12/7/2022  == as above      3. Constipation  Advised to take miralax and senokot     4. Active smoker  Assistance with smoking cessation was offered, including:  []  Medications  [x]  Counseling  [x]  Printed Information on Smoking Cessation  [x]  Referral to a Smoking Cessation Program    Patient was counseled regarding smoking for >10 minutes.         Plan:  Completed bautista-adj chemotherapy   MRI abdomen planned for 3/28/23  Refer to Dr Barksdale for second opinion for colorectal surgery evaluation           Total time spent in counseling and discussion about further management options including relevant lab work, treatment,  prognosis, medications and intended side effects was more than 60 minutes. More than 50% of the time was spent on counseling and coordination of care.  This includes face to face time and non-face to face time preparing to see the patient (eg, review of tests), Obtaining and/or reviewing separately obtained history, Documenting clinical information in the electronic or other health record, Independently interpreting resultsand communicating results to the patient/family/caregiver, or Care coordination.

## 2023-03-29 ENCOUNTER — TELEPHONE (OUTPATIENT)
Dept: HEMATOLOGY/ONCOLOGY | Facility: CLINIC | Age: 63
End: 2023-03-29
Payer: MEDICAID

## 2023-03-29 DIAGNOSIS — D30.11: Primary | ICD-10-CM

## 2023-03-29 NOTE — TELEPHONE ENCOUNTER
Spoke with [facility] w/ Dr Brumfield office to verify referral was received. They state that they have received it and will reach out to the patient. TTRN

## 2023-03-30 ENCOUNTER — TELEPHONE (OUTPATIENT)
Dept: HEMATOLOGY/ONCOLOGY | Facility: CLINIC | Age: 63
End: 2023-03-30
Payer: MEDICAID

## 2023-03-30 NOTE — TELEPHONE ENCOUNTER
Spoke to pt. Informed her the referral has been sent.    ----- Message from Birgit Gay sent at 3/30/2023  8:53 AM CDT -----  Contact: Patient  Patient called to consult with nurse or staff regarding an appointment she has with another provider. She states she has some questions for Patti about a referral and wanted to speak with office if possible. She can be reached at [phone]. Thanks/MR

## 2023-03-31 ENCOUNTER — SPECIALTY PHARMACY (OUTPATIENT)
Dept: PHARMACY | Facility: CLINIC | Age: 63
End: 2023-03-31
Payer: MEDICAID

## 2023-03-31 NOTE — TELEPHONE ENCOUNTER
Per provider Mrs. Munguia completed capecitabine cycles. Outgoing call to patient discussed drug disposal and will disenroll patient from [facility]

## 2023-04-03 ENCOUNTER — TELEPHONE (OUTPATIENT)
Dept: HEMATOLOGY/ONCOLOGY | Facility: CLINIC | Age: 63
End: 2023-04-03
Payer: MEDICAID

## 2023-04-03 NOTE — TELEPHONE ENCOUNTER
Spoke with daughter Petty and informed her that the paperwork has been filled and can come pick it up at the . Voiced understanding.CS LPN  ----- Message from Birgit Gay sent at 3/31/2023  4:48 PM CDT -----  Contact: Petty(daughter)  Petty called to consult with nurse or staff regarding the [facility] paperwork. She wanted to know if the paperwork had been filled out and would like a call back. She can be reached at [phone]. Thanks/MR

## 2023-04-04 ENCOUNTER — TELEPHONE (OUTPATIENT)
Dept: SURGERY | Facility: CLINIC | Age: 63
End: 2023-04-04
Payer: MEDICAID

## 2023-04-04 NOTE — TELEPHONE ENCOUNTER
Per Dr. Torre: pt needs ostomy powder. TORB. Called pt to let her know but was unable to reach. Unable to leave VM due to pt's VM box being full.

## 2023-04-04 NOTE — TELEPHONE ENCOUNTER
----- Message from Ambar Nguyen sent at 4/4/2023  1:38 PM CDT -----  Contact: Yulissa Duenas called in to say that she is still waiting on a call back at [phone] and the add on is itchy and red.    Thanks  Td

## 2023-04-06 ENCOUNTER — TELEPHONE (OUTPATIENT)
Dept: SURGERY | Facility: CLINIC | Age: 63
End: 2023-04-06
Payer: MEDICAID

## 2023-04-06 ENCOUNTER — OFFICE VISIT (OUTPATIENT)
Dept: HEMATOLOGY/ONCOLOGY | Facility: CLINIC | Age: 63
End: 2023-04-06
Payer: MEDICAID

## 2023-04-06 VITALS
SYSTOLIC BLOOD PRESSURE: 105 MMHG | HEART RATE: 90 BPM | OXYGEN SATURATION: 99 % | RESPIRATION RATE: 16 BRPM | DIASTOLIC BLOOD PRESSURE: 71 MMHG | TEMPERATURE: 98 F | WEIGHT: 144.69 LBS | BODY MASS INDEX: 27.34 KG/M2

## 2023-04-06 DIAGNOSIS — D30.11: ICD-10-CM

## 2023-04-06 DIAGNOSIS — Z93.3 COLOSTOMY PRESENT: ICD-10-CM

## 2023-04-06 DIAGNOSIS — C20 RECTAL CANCER: Primary | ICD-10-CM

## 2023-04-06 PROCEDURE — 3008F PR BODY MASS INDEX (BMI) DOCUMENTED: ICD-10-PCS | Mod: CPTII,S$GLB,, | Performed by: NURSE PRACTITIONER

## 2023-04-06 PROCEDURE — 3008F BODY MASS INDEX DOCD: CPT | Mod: CPTII,S$GLB,, | Performed by: NURSE PRACTITIONER

## 2023-04-06 PROCEDURE — 3078F DIAST BP <80 MM HG: CPT | Mod: CPTII,S$GLB,, | Performed by: NURSE PRACTITIONER

## 2023-04-06 PROCEDURE — 3074F PR MOST RECENT SYSTOLIC BLOOD PRESSURE < 130 MM HG: ICD-10-PCS | Mod: CPTII,S$GLB,, | Performed by: NURSE PRACTITIONER

## 2023-04-06 PROCEDURE — 3074F SYST BP LT 130 MM HG: CPT | Mod: CPTII,S$GLB,, | Performed by: NURSE PRACTITIONER

## 2023-04-06 PROCEDURE — 3078F PR MOST RECENT DIASTOLIC BLOOD PRESSURE < 80 MM HG: ICD-10-PCS | Mod: CPTII,S$GLB,, | Performed by: NURSE PRACTITIONER

## 2023-04-06 PROCEDURE — 99215 PR OFFICE/OUTPT VISIT, EST, LEVL V, 40-54 MIN: ICD-10-PCS | Mod: S$GLB,,, | Performed by: NURSE PRACTITIONER

## 2023-04-06 PROCEDURE — 99215 OFFICE O/P EST HI 40 MIN: CPT | Mod: S$GLB,,, | Performed by: NURSE PRACTITIONER

## 2023-04-06 NOTE — TELEPHONE ENCOUNTER
Spoke with patient regarding appointment details for Monday 4/10 at 1pm. Informed patient of address and location at [facility]- 4th floor. Pt verbalized understanding.     ----- Message from Jordyn Wright sent at 4/6/2023 10:13 AM CDT -----  Type:  Sooner Apoointment Request    Caller is requesting a sooner appointment.  Caller declined first available appointment listed below.  Caller will not accept being placed on the waitlist and is requesting a message be sent to doctor.  Name of Caller:patient  When is the first available appointment?na  Symptoms:Np referral form [facility]  Would the patient rather a call back or a response via [facility]? Call back  Best Call Back Number:[phone]  Additional Information: pt need to status of appt.

## 2023-04-06 NOTE — PROGRESS NOTES
MEDICAL ONCOLOGY FOLLOW UP CONSULTATION NOTE    Patient ID: Yulissa Munguia is a 62 y.o. female.    Chief Complaint: Rectal cancer    HPI:  Patient is a 61-year-old female who was previously diagnosed with rectal cancer about a year back and was seen and evaluated by [facility] with recommendations for chemo XRT for for presumed rectal cancer stage III A. Patient however was reluctant to get any treatment and did not pursue any medical care since then.  She now was referred by her primary care provider after patient was dismissed from [facility].  She presents to our clinic today with her daughters for further evaluation.  She still does not seem to be interested in pursuing chemo or radiation options but would like to have a discussion about her prognosis.  She also reports having a rectal tube placed recently in [place] but I have no records to suggest that it was done recently and for what indication.  She continues to smoke every day and does not seem to actually believe in her cancer diagnosis.      Social History     Socioeconomic History    Marital status:    Tobacco Use    Smoking status: Every Day     Years: 20.00     Types: Cigarettes    Smokeless tobacco: Never    Tobacco comments:     would be interested in smoking cessation in the future   Substance and Sexual Activity    Alcohol use: Not Currently    Drug use: Not Currently     Past Surgical History:   Procedure Laterality Date    ADENOIDECTOMY       SECTION      CORONARY ANGIOPLASTY WITH STENT PLACEMENT      PORTACATH PLACEMENT      ROBOT-ASSISTED CREATION OF COLOSTOMY USING DA MARTINA XI Left     TONSILLECTOMY      TUBAL LIGATION           Review of systems:  Review of Systems   Constitutional:  Positive for activity change and appetite change. Negative for chills, diaphoresis, fatigue and unexpected weight change.   HENT:  Negative for congestion, facial swelling, hearing loss, mouth sores, trouble  swallowing and voice change.    Eyes:  Negative for photophobia, pain, discharge and itching.   Respiratory:  Negative for apnea, cough, choking, chest tightness and shortness of breath.    Cardiovascular:  Negative for chest pain, palpitations and leg swelling.   Gastrointestinal:  Positive for rectal pain. Negative for abdominal distention, abdominal pain, anal bleeding and blood in stool.   Endocrine: Negative for cold intolerance, heat intolerance, polydipsia and polyphagia.   Genitourinary:  Negative for difficulty urinating, dysuria, flank pain and hematuria.   Musculoskeletal:  Negative for arthralgias, back pain, joint swelling, myalgias, neck pain and neck stiffness.   Skin:  Negative for color change, pallor and wound.   Allergic/Immunologic: Negative for environmental allergies, food allergies and immunocompromised state.   Neurological:  Negative for dizziness, seizures, facial asymmetry, speech difficulty, light-headedness, numbness and headaches.   Hematological:  Negative for adenopathy. Does not bruise/bleed easily.   Psychiatric/Behavioral:  Negative for agitation, behavioral problems, confusion, decreased concentration and sleep disturbance.            Physical Exam  Vitals and nursing note reviewed.   Constitutional:       General: She is not in acute distress.     Appearance: Normal appearance. She is not ill-appearing.   HENT:      Head: Normocephalic and atraumatic.      Nose: No congestion or rhinorrhea.   Eyes:      General: No scleral icterus.     Extraocular Movements: Extraocular movements intact.      Pupils: Pupils are equal, round, and reactive to light.   Cardiovascular:      Rate and Rhythm: Normal rate and regular rhythm.      Pulses: Normal pulses.      Heart sounds: Normal heart sounds. No murmur heard.    No gallop.   Pulmonary:      Effort: Pulmonary effort is normal. No respiratory distress.      Breath sounds: Normal breath sounds. No stridor. No wheezing or rhonchi.    Abdominal:      General: Bowel sounds are normal. There is no distension.      Palpations: There is no mass.      Tenderness: There is no abdominal tenderness. There is no guarding.   Musculoskeletal:         General: No swelling, tenderness, deformity or signs of injury. Normal range of motion.      Cervical back: Normal range of motion and neck supple. No rigidity. No muscular tenderness.      Right lower leg: No edema.      Left lower leg: No edema.   Skin:     General: Skin is warm.      Coloration: Skin is not jaundiced or pale.      Findings: No bruising or lesion.   Neurological:      General: No focal deficit present.      Mental Status: She is alert and oriented to person, place, and time.      Cranial Nerves: No cranial nerve deficit.      Sensory: No sensory deficit.      Motor: No weakness.      Gait: Gait normal.   Psychiatric:         Mood and Affect: Mood normal.         Behavior: Behavior normal.         Thought Content: Thought content normal.     Vitals:    23 0846   BP: 105/71   Pulse: 90   Resp: 16   Temp: 97.6 °F (36.4 °C)       Body surface area is 1.68 meters squared.    IMAGING:    [facility] UNKNOWN RAD EAP                                RADIOLOGY REPORT        PT NAME: JEREMY APONTE      [facility] IMAGING     : 1960 F 62             [address]    ACCT: [id number]                                              [facility] Rec #: [id number]                                        [id number]    [facility]             Procedure: PELVIS WO/W CONTRAST    REQUISITION #: [id number]      REPORT #: [id number]           DATE OF EXAM: 23    TIME OF EXAM: 0630       PELVIS WO/W CONTRAST        HISTORY:  MALIGNANT NEOPLASM OF RECTUM        TECHNIQUE:  Multiplanar multisequence imaging was obtained of the pelvis   both pre and post contrast at 1.5T.  Sequences include Coronal, axial, axial   oblique, and sagittal T2-weighted images.  Pre  and post T1 VIBE images were    obtained in multiple planes. Diffusion weighted imaging was acquired.  Image   quality is adequate.        TUMOR LOCATION AND CHARACTERISTICS        Tumor location (from anal verge): 3.7 cm    Anal verge to distal tumor margin:  2.6 cm    Tumor at or below the puborectalis sling: Yes    Distance of lowest extent of tumor from the top of anal sphincter 0 cm    Relationship to the anterior peritoneal reflection: below the peritoneal   reflection.    Craniocaudal length of the tumor:  3.7 cm    Clock face of tumor: Circumferential    Shape of tumor:  Annular    Mucinous:  Yes        EXTRAMURAL DEPTH OF INVASION AND MR T-CATEGORY        Extramural depth of invasion is 3 mm.    T-stage: T3/possible T4        For low rectal tumors (maximum tumor depth at or below the puborectalis   sling):  9 mm        RELATIONSHIP OF THE TUMOR TO MESORECTAL FASCIA (MRF)        Shortest distance 0 mm of the definitive tumor border to the MRF is            EXTRAMURAL VENOUS INVASION        Extramural venous invasion (EMVI) is absent.            MESORECTAL LYMPH NODES AND TUMOR DEPOSITS        Any suspicious mesorectal lymph nodes:  No    (suspicious=mixed signal or irregular borders, and/or short axis >=8 mm)        EXTRA-MESORECTAL LYMPH NODES        Any suspicious extra-mesorectal lymph nodes:  No                Is the MIRIAM node station in the field of view:  Yes      * If Yes, are these nodes suspicious:   No        OTHER FINDINGS (COMPLICATIONS, METASTASES, LIMITATIONS)        None        IMPRESSION:        T-stage: 3, possible T4 mucinous tumor since the anterior aspect of the   tumor abuts the posterior aspect of the vagina, series 5, image 30.    Maximum EMD of invasion is: 9 mm    Minimum tumor to MRF distance is:  0 mm    Low rectal tumor component: Yes    Mesorectal nodes/tumor deposits: No    Extra-mesorectal nodes: Negative    N-stage: N0 No lymph node metastasis    EMVI: Absent         Electronically signed by: Mookie Donahue MD (3/28/2023 3:13 PM)        DICTATING PHYSICIAN:  MOOKIE DONAHUE MD                   Date Dictated: 23 0540        Signed By:  MOOKIE DONAHUE MD     Date Signed:  23 8742     CC: FRANCIA TORRES NP ; FRANCIA TORRES NP      ADMITTING PHYSICIAN:                                                                                                    ORDERING PHY: FRANCIA TORRES NP                                                                                                                                                      ATTENDING PHY: FRANCIA TORRES NP    Patient Status:  REG CLI    Admit Service Date: 23       [facility] UNKNOWN RAD EAP                                RADIOLOGY REPORT        PT NAME: JEREMY APONTE      [facility] IMAGING     : 1960 F 62             [address]    ACCT: [id number]                                              [facility] Rec #: [id number]                                        [id number]    [facility]             Procedure: ABDOMEN WWO    REQUISITION #: [id number]      REPORT #: [id number]           DATE OF EXAM: 23    TIME OF EXAM: 0600       ABDOMEN WWO        Indication:  BENIGN NEOPLASM OF RIGHT RENAL PELVIS        Comparison:  CT abdomen pelvis 3/14/2023        TECHNIQUE:        Pre and postcontrast multiplanar multisequence MRI of the abdomen was   performed.        Discussion:        There is an enhancing 12 x 7 mm polypoid lesion within the right renal   pelvis best demonstrated on series 3, image 15. No other suspicious renal   lesion is seen. There is a 4 mm simple cyst of the lower pole of the right   kidney.        No evidence of lymphadenopathy or free fluid. The gallbladder and biliary   tree are unremarkable. The liver, spleen, pancreas, adrenals are   unremarkable.        There is a left lower quadrant colostomy.        IMPRESSION:        12 x 7 mm  enhancing polypoid lesion of the right renal pelvis is suspicious    for transitional cell carcinoma. A urology referral is recommended.        No other disease of the upper abdomen is seen.        Electronically signed by: Mookie Donahue MD (3/28/2023 2:57 PM)        DICTATING PHYSICIAN:  MOOKIE DONAHUE MD                   Date Dictated: 03/28/23 0540        Signed By:  MOOKIE DONAHUE MD     Date Signed:  03/28/23 1500     CC: FRANCIA TORRES NP ; FRANCIA TORRES NP      ADMITTING PHYSICIAN:                                                                                                    ORDERING PHY: FRANCIA TORRES NP                                                                                                                                                      ATTENDING PHY: FRANCIA TORRES NP    Patient Status:  REG CLI    Admit Service Date: 03/28/23                Assessment/Plan:      ECOG 1     1. Rectal Cancer:    == Initially diagnosed in July 2021 as stage IIIA rectal cancer.  She has not followed and not pursued any treatment since then as she is skeptical about chemotherapy and side effects of radiation.  She believes an alternate treatment options which she has likely been doing since that time.  After recent dismissal from [facility] she presents to us for further discussion of treatment and management options.  == 7/14/22:  Patient underwent PET scan 07/08/2022 which showed continued thickening of the rectal wall extending into the anal region with increased radiotracer uptake.  This continues to extend over about 6 cm in length.  Increased radiotracer uptake remains present in the area with an SUV of approximately 22.5 compared 24.4 on the previous examination.  Indistinctness of the fat planes posteriorly remain present consistent with extra colonic extension.  There continues to be hypermetabolic lymph nodes in the inguinal region bilaterally short axis diameter is on the order of  about 12 mm are present with SUVs on the order of approximately 3.4.  A mildly hypermetabolic lymph node remains present along the left sidewall of the pelvis having short axis diameter of approximately 7 mm today versus 10 mm on the previous examination.  The SUV in this area is approximately 2.1 versus 5.8 on previous exam. Considering her original diagnosis as Stage IIIA with outside oncologist, this PET scan shows it is likely, T4a with extracolonic extension and N1b with 2-3 regional LN involvement. I discussed with her about chemo-XRT  and would like to talk to radiation oncology first before she decides to pursue any treatment. I will hence make referral to Rad-onc in this regard  ==07/21/2022: Patient reports that she would like to pursue chemoradiaton. She has appointment today for radiation evaluation with Dr. Contreras. I discussed with Dr. Herndon and if she is candidate for radiation plan is for concurrent XRT with 5-fu. She will need mediport placement. Referral placed.   ==07/28/2022:  Pt here today to discuss upcoming chemotherapy, side effect profile, risk versus benefits, and expected outcomes have been discussed today. All questions and concerns answered and consents have been signed.After discussion with XRT and Dr. Herndon plan is to proceed with concurrent XRT and Xeloda. Xeloda dosage calculated with only 500mg tablets due to patient getting very confused with medications and having difficulty repeating proper dosage back after multiple attempts at teaching. She is poor candidate receive combination of 500mg and 150mg and will likely confuse them and take improper dose. She also reports taking too many to name or remember herbs and supplements which are not on medication list. I instructed her to contact clinic with list to ensure no interactions.   == 8/2022 - 9/13/22: completed XRT/xeloda. Based on NCCN Guidelines we will proceed with Capox for 12-16 weeks.  == 11/8/22 - 3/16/2023: completed 6  cycles of xelox with diverting colostomy on 11/22/22 with Dr Torre  == 3/16/23:  Here today to review recent CT chest abdomen pelvis showing no evidence of rectal cancer noted.  A questionable 1.6 cm right renal pelvis masses noted recommending MRI abdomen with and without orders have been placed today.  Will refer patient back to Dr. Lomeli for evaluation in [facility]  == 3/23/23:  Patient walked into clinic today to discuss recent visit with Dr. Lomeli, currently she does not desire colorectal surgery after completion of neoadjuvant chemotherapy.  Today we discussed the importance of surgical resection as completion of therapy related to her diagnoses.  She is most concerned about having her rectal tube removed as it is causing her discomfort.  Explained that we concur with Dr. Lomeli recommended surgical resection prior to rectal tube removal.  She is willing to obtain a 2nd opinion with additional colorectal surgeon to discuss surgical options after her MRI is resulted which is scheduled for Tuesday March 26, 2023 but states she is still hesitant to undergo surgery.  == 4/6/23: here today to review MRI abdomen/pelvis showing 3.7 cm rectal mass abutting the posterior vagina near anal verge  with no lymph nodes noted, long discussion with patient and daughter today regarding recommendation of surgical intervention or know rectal mass to complete curative intent care. She is unsure if she will pursue surgery but would like to have her rectal tube removed as it is causing her discomfort. Referral to Dr Barksdale for second opinion pending. Incidental Right Renal Pelvis mass 1.2 cm, she has an appointment with Urology in May 2023.      2. Pain Management:  ==MSER 15 mg po q 12 hours - refilled 02/16/2023  ==Hydrocodone 10/325 prn for breakthrough pain filled 12/7/2022  == as above      3. Constipation  Advised to take miralax and senokot     4. Active smoker  Assistance with smoking cessation was offered,  including:  []  Medications  [x]  Counseling  [x]  Printed Information on Smoking Cessation  [x]  Referral to a Smoking Cessation Program    Patient was counseled regarding smoking for >10 minutes.         Plan:  Completed bautista-adj chemotherapy   MRI abdomen /pelvis discussed   Refer to Dr Barksdale for second opinion for colorectal surgery evaluation           Total time spent in counseling and discussion about further management options including relevant lab work, treatment,  prognosis, medications and intended side effects was more than 60 minutes. More than 50% of the time was spent on counseling and coordination of care.  This includes face to face time and non-face to face time preparing to see the patient (eg, review of tests), Obtaining and/or reviewing separately obtained history, Documenting clinical information in the electronic or other health record, Independently interpreting resultsand communicating results to the patient/family/caregiver, or Care coordination.

## 2023-04-10 ENCOUNTER — OFFICE VISIT (OUTPATIENT)
Dept: SURGERY | Facility: CLINIC | Age: 63
End: 2023-04-10
Payer: MEDICAID

## 2023-04-10 VITALS
WEIGHT: 142.44 LBS | DIASTOLIC BLOOD PRESSURE: 84 MMHG | HEART RATE: 79 BPM | BODY MASS INDEX: 26.91 KG/M2 | SYSTOLIC BLOOD PRESSURE: 133 MMHG

## 2023-04-10 DIAGNOSIS — C20 RECTAL ADENOCARCINOMA: Primary | ICD-10-CM

## 2023-04-10 DIAGNOSIS — C20 RECTAL CANCER: ICD-10-CM

## 2023-04-10 PROCEDURE — 45330 PR SIGMOIDOSCOPY,DIAG2STIC: ICD-10-PCS | Mod: S$PBB,,, | Performed by: COLON & RECTAL SURGERY

## 2023-04-10 PROCEDURE — 99499 UNLISTED E&M SERVICE: CPT | Mod: S$PBB,,, | Performed by: COLON & RECTAL SURGERY

## 2023-04-10 PROCEDURE — 3008F PR BODY MASS INDEX (BMI) DOCUMENTED: ICD-10-PCS | Mod: CPTII,,, | Performed by: COLON & RECTAL SURGERY

## 2023-04-10 PROCEDURE — 99999 PR PBB SHADOW E&M-EST. PATIENT-LVL III: CPT | Mod: PBBFAC,,, | Performed by: COLON & RECTAL SURGERY

## 2023-04-10 PROCEDURE — 99999 PR PBB SHADOW E&M-EST. PATIENT-LVL III: ICD-10-PCS | Mod: PBBFAC,,, | Performed by: COLON & RECTAL SURGERY

## 2023-04-10 PROCEDURE — 99205 PR OFFICE/OUTPT VISIT, NEW, LEVL V, 60-74 MIN: ICD-10-PCS | Mod: S$PBB,25,, | Performed by: COLON & RECTAL SURGERY

## 2023-04-10 PROCEDURE — 3079F DIAST BP 80-89 MM HG: CPT | Mod: CPTII,,, | Performed by: COLON & RECTAL SURGERY

## 2023-04-10 PROCEDURE — 3008F BODY MASS INDEX DOCD: CPT | Mod: CPTII,,, | Performed by: COLON & RECTAL SURGERY

## 2023-04-10 PROCEDURE — 99417 PR PROLONGED SVC, OUTPT, W/WO DIRECT PT CONTACT,  EA ADDTL 15 MIN: ICD-10-PCS | Mod: S$PBB,,, | Performed by: COLON & RECTAL SURGERY

## 2023-04-10 PROCEDURE — 99499 NO LOS: ICD-10-PCS | Mod: S$PBB,,, | Performed by: COLON & RECTAL SURGERY

## 2023-04-10 PROCEDURE — 3075F PR MOST RECENT SYSTOLIC BLOOD PRESS GE 130-139MM HG: ICD-10-PCS | Mod: CPTII,,, | Performed by: COLON & RECTAL SURGERY

## 2023-04-10 PROCEDURE — 45330 DIAGNOSTIC SIGMOIDOSCOPY: CPT | Mod: PBBFAC | Performed by: COLON & RECTAL SURGERY

## 2023-04-10 PROCEDURE — 99417 PROLNG OP E/M EACH 15 MIN: CPT | Mod: S$PBB,,, | Performed by: COLON & RECTAL SURGERY

## 2023-04-10 PROCEDURE — 99205 OFFICE O/P NEW HI 60 MIN: CPT | Mod: S$PBB,25,, | Performed by: COLON & RECTAL SURGERY

## 2023-04-10 PROCEDURE — 3075F SYST BP GE 130 - 139MM HG: CPT | Mod: CPTII,,, | Performed by: COLON & RECTAL SURGERY

## 2023-04-10 PROCEDURE — 3079F PR MOST RECENT DIASTOLIC BLOOD PRESSURE 80-89 MM HG: ICD-10-PCS | Mod: CPTII,,, | Performed by: COLON & RECTAL SURGERY

## 2023-04-10 PROCEDURE — 99213 OFFICE O/P EST LOW 20 MIN: CPT | Mod: PBBFAC,25 | Performed by: COLON & RECTAL SURGERY

## 2023-04-10 PROCEDURE — 45330 DIAGNOSTIC SIGMOIDOSCOPY: CPT | Mod: S$PBB,,, | Performed by: COLON & RECTAL SURGERY

## 2023-04-10 NOTE — PROVATION PATIENT INSTRUCTIONS
Discharge Summary/Instructions after an Endoscopic Procedure  Patient Name: Yulissa Munguia  Patient MRN: [id number]  Patient YOB: 1960  Monday, April 10, 2023 Shankar Barksdale MD  Dear patient,  As a result of recent federal legislation (The Federal Cures Act), you may   receive lab or pathology results from your procedure in your [facility]   account before your physician is able to contact you. Your physician or   their representative will relay the results to you with their   recommendations at their soonest availability.  Thank you,  RESTRICTIONS:  During your procedure today, you received medications for sedation.  These   medications may affect your judgment, balance and coordination.  Therefore,   for 24 hours, you have the following restrictions:   - DO NOT drive a car, operate machinery, make legal/financial decisions,   sign important papers or drink alcohol.    ACTIVITY:  Today: no heavy lifting, straining or running due to procedural   sedation/anesthesia.  The following day: return to full activity including work.  DIET:  Eat and drink normally unless instructed otherwise.     TREATMENT FOR COMMON SIDE EFFECTS:  - Mild abdominal pain, nausea, belching, bloating or excessive gas:  rest,   eat lightly and use a heating pad.  - Sore Throat: treat with throat lozenges and/or gargle with warm salt   water.  - Because air was used during the procedure, expelling large amounts of air   from your rectum or belching is normal.  - If a bowel prep was taken, you may not have a bowel movement for 1-3 days.    This is normal.  SYMPTOMS TO WATCH FOR AND REPORT TO YOUR PHYSICIAN:  1. Abdominal pain or bloating, other than gas cramps.  2. Chest pain.  3. Back pain.  4. Signs of infection such as: chills or fever occurring within 24 hours   after the procedure.  5. Rectal bleeding, which would show as bright red, maroon, or black stools.   (A tablespoon of blood from the rectum is not serious, especially  if   hemorrhoids are present.)  6. Vomiting.  7. Weakness or dizziness.  GO DIRECTLY TO THE NEAREST EMERGENCY ROOM IF YOU HAVE ANY OF THE FOLLOWING:      Difficulty breathing              Chills and/or fever over 101 F   Persistent vomiting and/or vomiting blood   Severe abdominal pain   Severe chest pain   Black, tarry stools   Bleeding- more than one tablespoon   Any other symptom or condition that you feel may need urgent attention  Your doctor recommends these additional instructions:  If any biopsies were taken, your doctors clinic will contact you in 1 to 2   weeks with any results.  - Discharge patient to home.   - Resume previous diet.   - Continue present medications.  For questions, problems or results please call your physician Shankar Barksdale MD at Work:  [phone]  If you have any questions about the above instructions, call the GI   department at [phone] or call the endoscopy unit at [phone]   from 7am until 3 pm.  [facility], EMERGENCY ROOM PHONE NUMBER:   [phone]  IF A COMPLICATION OR EMERGENCY SITUATION ARISES AND YOU ARE UNABLE TO REACH   YOUR PHYSICIAN - GO DIRECTLY TO THE EMERGENCY ROOM.  I have read or have had read to me these discharge instructions for my   procedure and have received a written copy.  I understand these   instructions and will follow-up with my physician if I have any questions.     __________________________________       _____________________________________  Nurse Signature                                          Patient/Designated   Responsible Party Signature  MD Shankar Ferris MD  4/10/2023 4:00:29 PM  This report has been verified and signed electronically.  Dear patient,  As a result of recent federal legislation (The Federal Cures Act), you may   receive lab or pathology results from your procedure in your [facility]   account before your physician is able to contact you. Your physician or    their representative will relay the results to you with their   recommendations at their soonest availability.  Thank you,  [employer]ATION

## 2023-04-11 ENCOUNTER — TELEPHONE (OUTPATIENT)
Dept: HEMATOLOGY/ONCOLOGY | Facility: CLINIC | Age: 63
End: 2023-04-11
Payer: MEDICAID

## 2023-04-11 ENCOUNTER — TELEPHONE (OUTPATIENT)
Dept: SMOKING CESSATION | Facility: CLINIC | Age: 63
End: 2023-04-11
Payer: MEDICAID

## 2023-04-11 NOTE — TELEPHONE ENCOUNTER
Patient called to give an update on appt with Dr Barksdale yesterday. She states she went home and talked with her family and they decided together that she is not interested in pursuing any type of surgery or further testing with Dr Barksdale. She would like to look into getting her port taken out as well. Instructed patient to update Dr Barksdale's office and I would send message to Patti

## 2023-04-11 NOTE — TELEPHONE ENCOUNTER
Spoke with patient's daughter regarding patient's missed [facility] appointment.  She stated that she was unaware that pt missed her appointment and she would contact pt and contact counselor back.

## 2023-05-04 ENCOUNTER — OFFICE VISIT (OUTPATIENT)
Dept: HEMATOLOGY/ONCOLOGY | Facility: CLINIC | Age: 63
End: 2023-05-04
Payer: MEDICAID

## 2023-05-04 VITALS
RESPIRATION RATE: 16 BRPM | SYSTOLIC BLOOD PRESSURE: 107 MMHG | TEMPERATURE: 98 F | OXYGEN SATURATION: 98 % | WEIGHT: 144 LBS | HEART RATE: 83 BPM | HEIGHT: 61 IN | BODY MASS INDEX: 27.19 KG/M2 | DIASTOLIC BLOOD PRESSURE: 72 MMHG

## 2023-05-04 DIAGNOSIS — C20 RECTAL CANCER: Primary | ICD-10-CM

## 2023-05-04 PROCEDURE — 3074F SYST BP LT 130 MM HG: CPT | Mod: CPTII,S$GLB,, | Performed by: NURSE PRACTITIONER

## 2023-05-04 PROCEDURE — 1159F MED LIST DOCD IN RCRD: CPT | Mod: CPTII,S$GLB,, | Performed by: NURSE PRACTITIONER

## 2023-05-04 PROCEDURE — 3008F BODY MASS INDEX DOCD: CPT | Mod: CPTII,S$GLB,, | Performed by: NURSE PRACTITIONER

## 2023-05-04 PROCEDURE — 99215 OFFICE O/P EST HI 40 MIN: CPT | Mod: S$GLB,,, | Performed by: NURSE PRACTITIONER

## 2023-05-04 PROCEDURE — 3078F PR MOST RECENT DIASTOLIC BLOOD PRESSURE < 80 MM HG: ICD-10-PCS | Mod: CPTII,S$GLB,, | Performed by: NURSE PRACTITIONER

## 2023-05-04 PROCEDURE — 1159F PR MEDICATION LIST DOCUMENTED IN MEDICAL RECORD: ICD-10-PCS | Mod: CPTII,S$GLB,, | Performed by: NURSE PRACTITIONER

## 2023-05-04 PROCEDURE — 3078F DIAST BP <80 MM HG: CPT | Mod: CPTII,S$GLB,, | Performed by: NURSE PRACTITIONER

## 2023-05-04 PROCEDURE — 99215 PR OFFICE/OUTPT VISIT, EST, LEVL V, 40-54 MIN: ICD-10-PCS | Mod: S$GLB,,, | Performed by: NURSE PRACTITIONER

## 2023-05-04 PROCEDURE — 3074F PR MOST RECENT SYSTOLIC BLOOD PRESSURE < 130 MM HG: ICD-10-PCS | Mod: CPTII,S$GLB,, | Performed by: NURSE PRACTITIONER

## 2023-05-04 PROCEDURE — 3008F PR BODY MASS INDEX (BMI) DOCUMENTED: ICD-10-PCS | Mod: CPTII,S$GLB,, | Performed by: NURSE PRACTITIONER

## 2023-05-04 RX ORDER — SODIUM CHLORIDE 0.9 % (FLUSH) 0.9 %
10 SYRINGE (ML) INJECTION
Status: CANCELLED | OUTPATIENT
Start: 2023-05-04

## 2023-05-04 RX ORDER — HEPARIN 100 UNIT/ML
500 SYRINGE INTRAVENOUS
Status: CANCELLED | OUTPATIENT
Start: 2023-05-04

## 2023-05-04 NOTE — PROGRESS NOTES
MEDICAL ONCOLOGY FOLLOW UP CONSULTATION NOTE    Patient ID: Yulissa Munguia is a 62 y.o. female.    Chief Complaint: Rectal cancer    HPI:  Patient is a 61-year-old female who was previously diagnosed with rectal cancer about a year back and was seen and evaluated by [facility] with recommendations for chemo XRT for for presumed rectal cancer stage III A. Patient however was reluctant to get any treatment and did not pursue any medical care since then.  She now was referred by her primary care provider after patient was dismissed from [facility].  She presents to our clinic today with her daughters for further evaluation.  She still does not seem to be interested in pursuing chemo or radiation options but would like to have a discussion about her prognosis.  She also reports having a rectal tube placed recently in [place] but I have no records to suggest that it was done recently and for what indication.  She continues to smoke every day and does not seem to actually believe in her cancer diagnosis.      Social History     Socioeconomic History    Marital status:    Tobacco Use    Smoking status: Every Day     Years: 20.00     Types: Cigarettes    Smokeless tobacco: Never    Tobacco comments:     would be interested in smoking cessation in the future   Substance and Sexual Activity    Alcohol use: Not Currently    Drug use: Not Currently     Past Surgical History:   Procedure Laterality Date    ADENOIDECTOMY       SECTION      CORONARY ANGIOPLASTY WITH STENT PLACEMENT      PORTACATH PLACEMENT      ROBOT-ASSISTED CREATION OF COLOSTOMY USING DA MARTINA XI Left     TONSILLECTOMY      TUBAL LIGATION           Review of systems:  Review of Systems   Constitutional:  Positive for activity change and appetite change. Negative for chills, diaphoresis, fatigue and unexpected weight change.   HENT:  Negative for congestion, facial swelling, hearing loss, mouth sores, trouble  swallowing and voice change.    Eyes:  Negative for photophobia, pain, discharge and itching.   Respiratory:  Negative for apnea, cough, choking, chest tightness and shortness of breath.    Cardiovascular:  Negative for chest pain, palpitations and leg swelling.   Gastrointestinal:  Positive for rectal pain. Negative for abdominal distention, abdominal pain, anal bleeding and blood in stool.   Endocrine: Negative for cold intolerance, heat intolerance, polydipsia and polyphagia.   Genitourinary:  Negative for difficulty urinating, dysuria, flank pain and hematuria.   Musculoskeletal:  Negative for arthralgias, back pain, joint swelling, myalgias, neck pain and neck stiffness.   Skin:  Negative for color change, pallor and wound.   Allergic/Immunologic: Negative for environmental allergies, food allergies and immunocompromised state.   Neurological:  Negative for dizziness, seizures, facial asymmetry, speech difficulty, light-headedness, numbness and headaches.   Hematological:  Negative for adenopathy. Does not bruise/bleed easily.   Psychiatric/Behavioral:  Negative for agitation, behavioral problems, confusion, decreased concentration and sleep disturbance.            Physical Exam  Vitals and nursing note reviewed.   Constitutional:       General: She is not in acute distress.     Appearance: Normal appearance. She is not ill-appearing.   HENT:      Head: Normocephalic and atraumatic.      Nose: No congestion or rhinorrhea.   Eyes:      General: No scleral icterus.     Extraocular Movements: Extraocular movements intact.      Pupils: Pupils are equal, round, and reactive to light.   Cardiovascular:      Rate and Rhythm: Normal rate and regular rhythm.      Pulses: Normal pulses.      Heart sounds: Normal heart sounds. No murmur heard.    No gallop.   Pulmonary:      Effort: Pulmonary effort is normal. No respiratory distress.      Breath sounds: Normal breath sounds. No stridor. No wheezing or rhonchi.    Abdominal:      General: Bowel sounds are normal. There is no distension.      Palpations: There is no mass.      Tenderness: There is no abdominal tenderness. There is no guarding.   Musculoskeletal:         General: No swelling, tenderness, deformity or signs of injury. Normal range of motion.      Cervical back: Normal range of motion and neck supple. No rigidity. No muscular tenderness.      Right lower leg: No edema.      Left lower leg: No edema.   Skin:     General: Skin is warm.      Coloration: Skin is not jaundiced or pale.      Findings: No bruising or lesion.   Neurological:      General: No focal deficit present.      Mental Status: She is alert and oriented to person, place, and time.      Cranial Nerves: No cranial nerve deficit.      Sensory: No sensory deficit.      Motor: No weakness.      Gait: Gait normal.   Psychiatric:         Mood and Affect: Mood normal.         Behavior: Behavior normal.         Thought Content: Thought content normal.     Vitals:    23 0958   BP: 107/72   Pulse: 83   Resp: 16   Temp: 97.6 °F (36.4 °C)       Body surface area is 1.68 meters squared.    IMAGING:    [facility] UNKNOWN RAD EAP                                RADIOLOGY REPORT        PT NAME: JEREMY APONTE      [facility]     : 1960 F 62             [address]    ACCT: [id number]                                              [facility] Rec #: [id number]                                        [id number]    [facility]             Procedure: PELVIS WO/W CONTRAST    REQUISITION #: [id number]      REPORT #: [id number]           DATE OF EXAM: 23    TIME OF EXAM: 0630       PELVIS WO/W CONTRAST        HISTORY:  MALIGNANT NEOPLASM OF RECTUM        TECHNIQUE:  Multiplanar multisequence imaging was obtained of the pelvis   both pre and post contrast at 1.5T.  Sequences include Coronal, axial, axial   oblique, and sagittal T2-weighted images.  Pre  and post T1 VIBE images were    obtained in multiple planes. Diffusion weighted imaging was acquired.  Image   quality is adequate.        TUMOR LOCATION AND CHARACTERISTICS        Tumor location (from anal verge): 3.7 cm    Anal verge to distal tumor margin:  2.6 cm    Tumor at or below the puborectalis sling: Yes    Distance of lowest extent of tumor from the top of anal sphincter 0 cm    Relationship to the anterior peritoneal reflection: below the peritoneal   reflection.    Craniocaudal length of the tumor:  3.7 cm    Clock face of tumor: Circumferential    Shape of tumor:  Annular    Mucinous:  Yes        EXTRAMURAL DEPTH OF INVASION AND MR T-CATEGORY        Extramural depth of invasion is 3 mm.    T-stage: T3/possible T4        For low rectal tumors (maximum tumor depth at or below the puborectalis   sling):  9 mm        RELATIONSHIP OF THE TUMOR TO MESORECTAL FASCIA (MRF)        Shortest distance 0 mm of the definitive tumor border to the MRF is            EXTRAMURAL VENOUS INVASION        Extramural venous invasion (EMVI) is absent.            MESORECTAL LYMPH NODES AND TUMOR DEPOSITS        Any suspicious mesorectal lymph nodes:  No    (suspicious=mixed signal or irregular borders, and/or short axis >=8 mm)        EXTRA-MESORECTAL LYMPH NODES        Any suspicious extra-mesorectal lymph nodes:  No                Is the MIRIAM node station in the field of view:  Yes      * If Yes, are these nodes suspicious:   No        OTHER FINDINGS (COMPLICATIONS, METASTASES, LIMITATIONS)        None        IMPRESSION:        T-stage: 3, possible T4 mucinous tumor since the anterior aspect of the   tumor abuts the posterior aspect of the vagina, series 5, image 30.    Maximum EMD of invasion is: 9 mm    Minimum tumor to MRF distance is:  0 mm    Low rectal tumor component: Yes    Mesorectal nodes/tumor deposits: No    Extra-mesorectal nodes: Negative    N-stage: N0 No lymph node metastasis    EMVI: Absent         Electronically signed by: Mookie Donahue MD (3/28/2023 3:13 PM)        DICTATING PHYSICIAN:  MOOKIE DONAHUE MD                   Date Dictated: 23 0540        Signed By:  MOOKIE DONAHUE MD     Date Signed:  23 4197     CC: FRANCIA TORRES NP ; FRANCIA TORRES NP      ADMITTING PHYSICIAN:                                                                                                    ORDERING PHY: FRANCIA TORRES NP                                                                                                                                                      ATTENDING PHY: FRANCIA TORRES NP    Patient Status:  REG CLI    Admit Service Date: 23       [facility] UNKNOWN RAD EAP                                RADIOLOGY REPORT        PT NAME: JEREMY APONTE      [facility] [place]     : 1960 F 62             [address]    ACCT: [id number]                                              [facility] Rec #: [id number]                                        [id number]    [facility]             Procedure: ABDOMEN WWO    REQUISITION #: [id number]      REPORT #: [id number]           DATE OF EXAM: 23    TIME OF EXAM: 0600       ABDOMEN WWO        Indication:  BENIGN NEOPLASM OF RIGHT RENAL PELVIS        Comparison:  CT abdomen pelvis 3/14/2023        TECHNIQUE:        Pre and postcontrast multiplanar multisequence MRI of the abdomen was   performed.        Discussion:        There is an enhancing 12 x 7 mm polypoid lesion within the right renal   pelvis best demonstrated on series 3, image 15. No other suspicious renal   lesion is seen. There is a 4 mm simple cyst of the lower pole of the right   kidney.        No evidence of lymphadenopathy or free fluid. The gallbladder and biliary   tree are unremarkable. The liver, spleen, pancreas, adrenals are   unremarkable.        There is a left lower quadrant colostomy.        IMPRESSION:        12 x 7 mm  enhancing polypoid lesion of the right renal pelvis is suspicious    for transitional cell carcinoma. A urology referral is recommended.        No other disease of the upper abdomen is seen.        Electronically signed by: Mookie Donahue MD (3/28/2023 2:57 PM)        DICTATING PHYSICIAN:  MOOKIE DONAHUE MD                   Date Dictated: 03/28/23 0540        Signed By:  MOOKIE DONAHUE MD     Date Signed:  03/28/23 1500     CC: FRANCIA TORRES NP ; FRANCIA TORRES NP      ADMITTING PHYSICIAN:                                                                                                    ORDERING PHY: FRANCIA TORRES NP                                                                                                                                                      ATTENDING PHY: FRANCIA TORRES NP    Patient Status:  REG CLI    Admit Service Date: 03/28/23                Assessment/Plan:      ECOG 1     1. Rectal Cancer:    == Initially diagnosed in July 2021 as stage IIIA rectal cancer.  She has not followed and not pursued any treatment since then as she is skeptical about chemotherapy and side effects of radiation.  She believes an alternate treatment options which she has likely been doing since that time.  After recent dismissal from [facility] she presents to us for further discussion of treatment and management options.  == 7/14/22:  Patient underwent PET scan 07/08/2022 which showed continued thickening of the rectal wall extending into the anal region with increased radiotracer uptake.  This continues to extend over about 6 cm in length.  Increased radiotracer uptake remains present in the area with an SUV of approximately 22.5 compared 24.4 on the previous examination.  Indistinctness of the fat planes posteriorly remain present consistent with extra colonic extension.  There continues to be hypermetabolic lymph nodes in the inguinal region bilaterally short axis diameter is on the order of  about 12 mm are present with SUVs on the order of approximately 3.4.  A mildly hypermetabolic lymph node remains present along the left sidewall of the pelvis having short axis diameter of approximately 7 mm today versus 10 mm on the previous examination.  The SUV in this area is approximately 2.1 versus 5.8 on previous exam. Considering her original diagnosis as Stage IIIA with outside oncologist, this PET scan shows it is likely, T4a with extracolonic extension and N1b with 2-3 regional LN involvement. I discussed with her about chemo-XRT  and would like to talk to radiation oncology first before she decides to pursue any treatment. I will hence make referral to Rad-onc in this regard  ==07/21/2022: Patient reports that she would like to pursue chemoradiaton. She has appointment today for radiation evaluation with Dr. Contreras. I discussed with Dr. Herndon and if she is candidate for radiation plan is for concurrent XRT with 5-fu. She will need mediport placement. Referral placed.   ==07/28/2022:  Pt here today to discuss upcoming chemotherapy, side effect profile, risk versus benefits, and expected outcomes have been discussed today. All questions and concerns answered and consents have been signed.After discussion with XRT and Dr. Herndon plan is to proceed with concurrent XRT and Xeloda. Xeloda dosage calculated with only 500mg tablets due to patient getting very confused with medications and having difficulty repeating proper dosage back after multiple attempts at teaching. She is poor candidate receive combination of 500mg and 150mg and will likely confuse them and take improper dose. She also reports taking too many to name or remember herbs and supplements which are not on medication list. I instructed her to contact clinic with list to ensure no interactions.   == 8/2022 - 9/13/22: completed XRT/xeloda. Based on NCCN Guidelines we will proceed with Capox for 12-16 weeks.  == 11/8/22 - 3/16/2023: completed 6  cycles of xelox with diverting colostomy on 11/22/22 with Dr Torre  == 3/16/23:  Here today to review recent CT chest abdomen pelvis showing no evidence of rectal cancer noted.  A questionable 1.6 cm right renal pelvis masses noted recommending MRI abdomen with and without orders have been placed today.  Will refer patient back to Dr. Lomeli for evaluation in [facility]  == 3/23/23:  Patient walked into clinic today to discuss recent visit with Dr. Lomeli, currently she does not desire colorectal surgery after completion of neoadjuvant chemotherapy.  Today we discussed the importance of surgical resection as completion of therapy related to her diagnoses.  She is most concerned about having her rectal tube removed as it is causing her discomfort.  Explained that we concur with Dr. Lomeli recommended surgical resection prior to rectal tube removal.  She is willing to obtain a 2nd opinion with additional colorectal surgeon to discuss surgical options after her MRI is resulted which is scheduled for Tuesday March 26, 2023 but states she is still hesitant to undergo surgery.  == 4/6/23: here today to review MRI abdomen/pelvis showing 3.7 cm rectal mass abutting the posterior vagina near anal verge  with no lymph nodes noted, long discussion with patient and daughter today regarding recommendation of surgical intervention or know rectal mass to complete curative intent care. She is unsure if she will pursue surgery but would like to have her rectal tube removed as it is causing her discomfort. Referral to Dr Barksdale for second opinion pending. Incidental Right Renal Pelvis mass 1.2 cm, she has an appointment with Urology in May 2023.    5/4/23:  Here today after recent visit with 2nd colorectal surgeon who recommends surgical resection, patient states she has discussed with family and has decided she will not undergo any type of surgical resection.  She is aware of the risk of localized recurrence    2. Pain  Management:  ==MSER 15 mg po q 12 hours - refilled 02/16/2023  ==Hydrocodone 10/325 prn for breakthrough pain filled 12/7/2022  == as above      3. Constipation  Advised to take miralax and senokot           Plan:  Completed bautista-adj chemotherapy   Patient declines curative surgery after 2 separate evaluations with colorectal surgeons  Return to clinic in 3 months with repeat MRI abdomen pelvis      Total time spent in counseling and discussion about further management options including relevant lab work, treatment,  prognosis, medications and intended side effects was more than 60 minutes. More than 50% of the time was spent on counseling and coordination of care.  This includes face to face time and non-face to face time preparing to see the patient (eg, review of tests), Obtaining and/or reviewing separately obtained history, Documenting clinical information in the electronic or other health record, Independently interpreting resultsand communicating results to the patient/family/caregiver, or Care coordination.

## 2023-05-05 ENCOUNTER — TELEPHONE (OUTPATIENT)
Dept: SMOKING CESSATION | Facility: CLINIC | Age: 63
End: 2023-05-05
Payer: MEDICAID

## 2023-05-05 NOTE — TELEPHONE ENCOUNTER
Attempted to speak with patient regarding rescheduling her missed [facility] appointment.  Patient stated that she was busy at the moment and would have to discuss the matter later and hung up the phone.

## 2023-05-09 ENCOUNTER — PATIENT MESSAGE (OUTPATIENT)
Dept: RESEARCH | Facility: HOSPITAL | Age: 63
End: 2023-05-09
Payer: MEDICAID

## 2023-05-09 NOTE — PROGRESS NOTES
History & Physical    SUBJECTIVE:     Chief Complaint   Patient presents with    Rectal Cancer     Rectal cancer 2nd opinion    CC: Patti Webb NP    History of Present Illness:  Patient is a 62 y.o. female presents for a 2nd opinion for rectal adenocarcinoma.  Per outside facility reports, patient was initially diagnosed in July 2021 with stage IIIA rectal adenocarcinoma.  She was initially skeptical of having any chemotherapy or radiation infused treatment at that time.  She underwent repeat PET scan in July 2022 showing continued disease with extra colonic extension and srinivas disease.  She then consented to treatment and received long course chemo XRT followed by systemic chemotherapy which was completed on 03/16/2023 after 6 cycles of XELOX.  She had a repeat MRI performed in March 2023 showing continued disease with a reported 3.7 cm rectal mass abutting the posterior vagina near the anal verge.  She initially saw a colorectal surgeon in [facility] who recommended surgical resection.  She declined surgical resection and presents here for 2nd opinion.  Of note, she initially saw this colorectal surgeon in April of 2022 where she was found to have a supralevator abscess and malignant fistula where a seton was placed in the abscess was drained.  She has had a diverting colostomy construction previously.    Review of patient's allergies indicates:  No Known Allergies    Current Outpatient Medications   Medication Sig Dispense Refill    aspirin 81 MG Chew Take 81 mg by mouth.      ALPRAZolam (XANAX) 0.25 MG tablet Take 1 tablet prior to radiological scan for claustrophobia/anxiety 2 tablet 0    HYDROcodone-acetaminophen (NORCO)  mg per tablet Take 1 tablet by mouth every 8 (eight) hours as needed for Pain. 90 tablet 0    ondansetron (ZOFRAN) 8 MG tablet Take 1 tablet (8 mg total) by mouth every 8 (eight) hours as needed for Nausea. 30 tablet 2    ondansetron (ZOFRAN-ODT) 8 MG TbDL Take 1 tablet (8 mg  total) by mouth every 6 (six) hours as needed. 30 tablet 3    promethazine (PHENERGAN) 25 MG tablet TAKE ONE TABLET BY MOUTH EVERY EIGHT HOURS 30 tablet 0    turmeric 400 mg Cap Take by mouth.       No current facility-administered medications for this visit.       Past Medical History:   Diagnosis Date    Cancer of anal canal     Encounter for blood transfusion     Hypertension      Past Surgical History:   Procedure Laterality Date    ADENOIDECTOMY       SECTION      CORONARY ANGIOPLASTY WITH STENT PLACEMENT      PORTACATH PLACEMENT      ROBOT-ASSISTED CREATION OF COLOSTOMY USING DA MARTINA XI Left     TONSILLECTOMY      TUBAL LIGATION       Family History   Problem Relation Age of Onset    Esophageal cancer Mother     Hypertension Mother     Colon cancer Father      Social History     Tobacco Use    Smoking status: Every Day     Years: 20.00     Types: Cigarettes    Smokeless tobacco: Never    Tobacco comments:     would be interested in smoking cessation in the future   Substance Use Topics    Alcohol use: Not Currently    Drug use: Not Currently        Review of Systems:  Review of Systems   Constitutional:  Negative for activity change, appetite change, chills, fatigue, fever and unexpected weight change.   HENT:  Negative for congestion, ear pain, sore throat and trouble swallowing.    Eyes:  Negative for pain, redness and itching.   Respiratory:  Negative for cough, shortness of breath and wheezing.    Cardiovascular:  Negative for chest pain, palpitations and leg swelling.   Gastrointestinal:  Positive for nausea. Negative for abdominal distention, abdominal pain and vomiting.   Endocrine: Negative for cold intolerance, heat intolerance and polyuria.   Genitourinary:  Negative for dysuria, flank pain, frequency and hematuria.   Musculoskeletal:  Negative for gait problem, joint swelling and neck pain.   Skin:  Negative for color change, rash and wound.   Allergic/Immunologic: Negative for  environmental allergies and immunocompromised state.   Neurological:  Negative for dizziness, speech difficulty, weakness and numbness.   Psychiatric/Behavioral:  Negative for agitation, confusion and hallucinations.      OBJECTIVE:     Vital Signs (Most Recent)  Pulse: 79 (04/10/23 1329)  BP: 133/84 (04/10/23 1329)     64.6 kg (142 lb 6.7 oz)     Physical Exam:  Physical Exam  Exam conducted with a chaperone present.   Constitutional:       Appearance: She is well-developed.   HENT:      Head: Normocephalic and atraumatic.   Eyes:      Conjunctiva/sclera: Conjunctivae normal.   Neck:      Thyroid: No thyromegaly.   Cardiovascular:      Rate and Rhythm: Normal rate and regular rhythm.   Pulmonary:      Effort: Pulmonary effort is normal. No respiratory distress.   Abdominal:      General: There is no distension.      Palpations: Abdomen is soft. There is no mass.      Tenderness: There is no abdominal tenderness.   Genitourinary:     Comments: Anorectal: no external lesions; MICAH with severe stenosis in the distal rectum circumferential with inability to pass pinkie finger due to pain  Musculoskeletal:         General: No tenderness. Normal range of motion.      Cervical back: Normal range of motion.   Skin:     General: Skin is warm and dry.      Capillary Refill: Capillary refill takes less than 2 seconds.      Findings: No rash.   Neurological:      Mental Status: She is alert and oriented to person, place, and time.     Flexible Sigmoidoscopy Procedure Note    Procedure: Flexible Sigmoidoscopy    Pre-operative Diagnosis: Rectal cancer    Post-operative Diagnosis: Rectal cancer    Indications: Personal history of rectal cancer    Procedure Details     Informed consent was obtained for the procedure. Risks of perforation and hemorrhage were discussed. The patient was placed in the left lateral decubitus position, the anal region was examined, a rectal performed, then the 60cm flexible sigmoidoscope was inserted and  advanced with difficulty to a distance of 2 cm. The prep was fair. The instrument was withdrawn and retroflexed with poor views throughout.    Findings:  Severe stenosis in the distal rectum unable to pass a upper endoscope due to patient discomfort and pain and narrowing of the rectum          Specimens: none           Complications:  None; patient tolerated the procedure well.           Disposition: Home           Condition: stable    Impression:    Severe distal rectal stricture/stenosis, unable to pass upper endoscope    Recommendations:  If surgical intervention desired, will possibly need formal FlexSig under anesthesia    Attending Attestation: I performed the procedure.      Laboratory  Lab Results   Component Value Date    WBC 2.9 (L) 03/15/2023    HGB 11.3 (L) 03/15/2023    HCT 33.7 (L) 03/15/2023    ALT 20 03/15/2023    AST 21 03/15/2023     03/15/2023    K 4.4 03/15/2023     03/15/2023    CREATININE 0.56 03/23/2023    BUN 15 03/23/2023    CO2 33 (H) 03/15/2023    INR 1.0 11/22/2022       Diagnostic Results:  [facility] records of MRI, CT, PET, OP reports (no images available)    MRI Report March 2023 from OSH:      TUMOR LOCATION AND CHARACTERISTICS        Tumor location (from anal verge): 3.7 cm    Anal verge to distal tumor margin:  2.6 cm    Tumor at or below the puborectalis sling: Yes    Distance of lowest extent of tumor from the top of anal sphincter 0 cm    Relationship to the anterior peritoneal reflection: below the peritoneal   reflection.    Craniocaudal length of the tumor:  3.7 cm    Clock face of tumor: Circumferential    Shape of tumor:  Annular    Mucinous:  Yes        EXTRAMURAL DEPTH OF INVASION AND MR T-CATEGORY        Extramural depth of invasion is 3 mm.    T-stage: T3/possible T4        For low rectal tumors (maximum tumor depth at or below the puborectalis   sling):  9 mm        RELATIONSHIP OF THE TUMOR TO MESORECTAL FASCIA (MRF)        Shortest distance 0 mm of the  definitive tumor border to the MRF is            EXTRAMURAL VENOUS INVASION        Extramural venous invasion (EMVI) is absent.            MESORECTAL LYMPH NODES AND TUMOR DEPOSITS        Any suspicious mesorectal lymph nodes:  No    (suspicious=mixed signal or irregular borders, and/or short axis >=8 mm)        EXTRA-MESORECTAL LYMPH NODES        Any suspicious extra-mesorectal lymph nodes:  No                Is the MIRIAM node station in the field of view:  Yes      * If Yes, are these nodes suspicious:   No        OTHER FINDINGS (COMPLICATIONS, METASTASES, LIMITATIONS)        None        IMPRESSION:        T-stage: 3, possible T4 mucinous tumor since the anterior aspect of the   tumor abuts the posterior aspect of the vagina, series 5, image 30.    Maximum EMD of invasion is: 9 mm    Minimum tumor to MRF distance is:  0 mm    Low rectal tumor component: Yes    Mesorectal nodes/tumor deposits: No    Extra-mesorectal nodes: Negative    N-stage: N0 No lymph node metastasis    EMVI: Absent      ASSESSMENT/PLAN:     61yo F with distal rectal adenocarcinoma with apparent residual disease and malignant stenosis    - Long discussion with the patient regarding her previous disease as well as ongoing stenosis with inability to pass a upper scope through this and inability to tolerate a digital rectal exam.  Discussed that given the disease in the rectum, coupled with the reports of the MRI that was done in March, she would likely require surgical resection for any chance of definitive cure.  Discussed that there was likely residual adenocarcinoma present but that to make a full evaluation we would need to complete her staging either by obtaining her outside images of the MRI and CT scan or possibly obtaining a new MRI rectal cancer protocol here in [place].  Once we complete this, discussed that I would recommend discussing her case at multidisciplinary rectal cancer tumor board.  She voiced understanding of this.   Discussed that she could have the images obtained closer to home in [facility] and then malleolus the images that we can upload into our system for full evaluation.  - she voiced that she is unsure whether she would want surgery at this time and would like to think about it and talk about it with her family which is reasonable.  Discussed that we have not definitively made a determination whether she is actually a surgical candidate as this would need to be assessed on imaging to ensure that the tumor is actually resectable.  We also discussed that we are unsure whether she would need other organs resected as there was a report that she had bladder cancer or possible vaginal involvement as well.  We discussed that even if she is had a complete response to chemotherapy and radiation, the stricture does not appear to be to reversible was still require surgical resection.  There was also no evidence of complete response per report on MRI.  - she will think about this and obtain images and sent them to us in the near future.  We will schedule a follow-up visit in 3 weeks virtually to discuss further.    Shankar Barksdale MD  Colon and Rectal Surgery  [facility]

## 2023-05-23 ENCOUNTER — OFFICE VISIT (OUTPATIENT)
Dept: UROLOGY | Facility: CLINIC | Age: 63
End: 2023-05-23
Payer: MEDICAID

## 2023-05-23 VITALS — HEART RATE: 89 BPM | TEMPERATURE: 99 F | DIASTOLIC BLOOD PRESSURE: 67 MMHG | SYSTOLIC BLOOD PRESSURE: 111 MMHG

## 2023-05-23 DIAGNOSIS — D30.11: ICD-10-CM

## 2023-05-23 PROCEDURE — 99205 OFFICE O/P NEW HI 60 MIN: CPT | Mod: S$GLB,,, | Performed by: UROLOGY

## 2023-05-23 PROCEDURE — 1159F PR MEDICATION LIST DOCUMENTED IN MEDICAL RECORD: ICD-10-PCS | Mod: CPTII,S$GLB,, | Performed by: UROLOGY

## 2023-05-23 PROCEDURE — 1159F MED LIST DOCD IN RCRD: CPT | Mod: CPTII,S$GLB,, | Performed by: UROLOGY

## 2023-05-23 PROCEDURE — 3078F PR MOST RECENT DIASTOLIC BLOOD PRESSURE < 80 MM HG: ICD-10-PCS | Mod: CPTII,S$GLB,, | Performed by: UROLOGY

## 2023-05-23 PROCEDURE — 3074F PR MOST RECENT SYSTOLIC BLOOD PRESSURE < 130 MM HG: ICD-10-PCS | Mod: CPTII,S$GLB,, | Performed by: UROLOGY

## 2023-05-23 PROCEDURE — 99205 PR OFFICE/OUTPT VISIT, NEW, LEVL V, 60-74 MIN: ICD-10-PCS | Mod: S$GLB,,, | Performed by: UROLOGY

## 2023-05-23 PROCEDURE — 3074F SYST BP LT 130 MM HG: CPT | Mod: CPTII,S$GLB,, | Performed by: UROLOGY

## 2023-05-23 PROCEDURE — 1160F PR REVIEW ALL MEDS BY PRESCRIBER/CLIN PHARMACIST DOCUMENTED: ICD-10-PCS | Mod: CPTII,S$GLB,, | Performed by: UROLOGY

## 2023-05-23 PROCEDURE — 3078F DIAST BP <80 MM HG: CPT | Mod: CPTII,S$GLB,, | Performed by: UROLOGY

## 2023-05-23 PROCEDURE — 1160F RVW MEDS BY RX/DR IN RCRD: CPT | Mod: CPTII,S$GLB,, | Performed by: UROLOGY

## 2023-05-23 NOTE — PROGRESS NOTES
Subjective:       Patient ID: Yulissa Munguia is a 62 y.o. female.    Chief Complaint: No chief complaint on file.      HPI:  62-year-old female who underwent chemo and radiation for rectal cancer was found on MRI imaging to have a 1.4 cm enhancing renal mass in the renal pelvis consistent with transitional cell carcinoma patient's has a history of smoking as well as gross hematuria which has never been worked up she is here for management and discussion    Past Medical History:   Past Medical History:   Diagnosis Date    Cancer of anal canal     Encounter for blood transfusion     Hypertension        Past Surgical Historical:   Past Surgical History:   Procedure Laterality Date    ADENOIDECTOMY       SECTION      CORONARY ANGIOPLASTY WITH STENT PLACEMENT      PORTACATH PLACEMENT      ROBOT-ASSISTED CREATION OF COLOSTOMY USING DA MARTINA XI Left     TONSILLECTOMY      TUBAL LIGATION          Medications:   Medication List with Changes/Refills   Current Medications    ASPIRIN 81 MG CHEW    Take 81 mg by mouth.    HYDROCODONE-ACETAMINOPHEN (NORCO)  MG PER TABLET    Take 1 tablet by mouth every 8 (eight) hours as needed for Pain.    ONDANSETRON (ZOFRAN) 8 MG TABLET    Take 1 tablet (8 mg total) by mouth every 8 (eight) hours as needed for Nausea.    ONDANSETRON (ZOFRAN-ODT) 8 MG TBDL    Take 1 tablet (8 mg total) by mouth every 6 (six) hours as needed.    PROMETHAZINE (PHENERGAN) 25 MG TABLET    TAKE ONE TABLET BY MOUTH EVERY EIGHT HOURS    TURMERIC 400 MG CAP    Take by mouth.   Discontinued Medications    ALPRAZOLAM (XANAX) 0.25 MG TABLET    Take 1 tablet prior to radiological scan for claustrophobia/anxiety        Past Social History:   Social History     Socioeconomic History    Marital status:    Tobacco Use    Smoking status: Every Day     Years: 20.00     Types: Cigarettes    Smokeless tobacco: Never    Tobacco comments:     would be interested in smoking cessation in the future   Substance  and Sexual Activity    Alcohol use: Not Currently    Drug use: Not Currently       Allergies: Review of patient's allergies indicates:  No Known Allergies     Family History:   Family History   Problem Relation Age of Onset    Esophageal cancer Mother     Hypertension Mother     Colon cancer Father         Review of Systems:  Review of Systems   Constitutional:  Negative for activity change and appetite change.   HENT:  Negative for congestion and dental problem.    Eyes:  Negative for pain and discharge.   Respiratory:  Negative for chest tightness and shortness of breath.    Cardiovascular:  Negative for chest pain.   Gastrointestinal:  Negative for abdominal distention and abdominal pain.   Endocrine: Negative for cold intolerance, heat intolerance and polyuria.   Genitourinary:  Negative for decreased urine volume, difficulty urinating, dyspareunia, dysuria, enuresis, flank pain, frequency, genital sores, hematuria, menstrual problem, pelvic pain, urgency, vaginal bleeding, vaginal discharge and vaginal pain.   Musculoskeletal:  Negative for back pain and neck pain.   Skin:  Negative for color change and rash.   Allergic/Immunologic: Negative for immunocompromised state.   Neurological:  Negative for dizziness.   Hematological:  Negative for adenopathy.   Psychiatric/Behavioral:  Negative for agitation, behavioral problems and confusion.      Physical Exam:  Physical Exam  Constitutional:       Appearance: She is well-developed.   HENT:      Head: Normocephalic and atraumatic.      Right Ear: External ear normal.      Left Ear: External ear normal.   Eyes:      Conjunctiva/sclera: Conjunctivae normal.   Neck:      Vascular: No JVD.   Cardiovascular:      Rate and Rhythm: Normal rate and regular rhythm.   Pulmonary:      Effort: Pulmonary effort is normal. No respiratory distress.      Breath sounds: Normal breath sounds. No wheezing.   Abdominal:      General: There is no distension.      Palpations: Abdomen is  soft.      Tenderness: There is no abdominal tenderness. There is no rebound.   Musculoskeletal:         General: Normal range of motion.      Cervical back: Normal range of motion.   Skin:     General: Skin is warm and dry.      Findings: No erythema or rash.   Neurological:      Mental Status: She is alert and oriented to person, place, and time.   Psychiatric:         Behavior: Behavior normal.       Assessment/Plan:       Problem List Items Addressed This Visit          Oncology    Benign neoplasm of right renal pelvis            1.4 cm enhancing renal pelvic lesion:  We would a long discussion about the significance of this lesion how with her history of cigarette smoking could possibly mean transitional cell carcinoma.  I have encouraged her to proceed with right ureteroscopy for diagnosis purposes potentially to treat this lesion.  Patient would like to speak with her family for his we will set up an appointment in 2 weeks for which her family will be available to further discuss this issue.  I have personally reviewed CT and MRI imaging

## 2023-08-30 ENCOUNTER — TELEPHONE (OUTPATIENT)
Dept: HEMATOLOGY/ONCOLOGY | Facility: CLINIC | Age: 63
End: 2023-08-30
Payer: MEDICAID

## 2023-08-30 NOTE — TELEPHONE ENCOUNTER
----- Message from Candy Abraham sent at 8/29/2023  2:58 PM CDT -----  Contact: pt  Zara w/[facility] connections calling about pt port being flushed and she can be reached at [phone] ext [phone] and pt can be reached at [phone].    Thanks,

## 2023-08-31 ENCOUNTER — TELEPHONE (OUTPATIENT)
Dept: HEMATOLOGY/ONCOLOGY | Facility: CLINIC | Age: 63
End: 2023-08-31
Payer: MEDICAID

## 2023-08-31 RX ORDER — HEPARIN 100 UNIT/ML
500 SYRINGE INTRAVENOUS
OUTPATIENT
Start: 2023-08-31

## 2023-08-31 RX ORDER — SODIUM CHLORIDE 0.9 % (FLUSH) 0.9 %
10 SYRINGE (ML) INJECTION
OUTPATIENT
Start: 2023-08-31

## 2023-09-29 ENCOUNTER — TELEPHONE (OUTPATIENT)
Dept: HEMATOLOGY/ONCOLOGY | Facility: CLINIC | Age: 63
End: 2023-09-29
Payer: MEDICAID

## 2023-09-29 NOTE — TELEPHONE ENCOUNTER
Patient called wanting FMLA and she needs to do MRI and see Patti Webb because of missed appointments. I explained to patient I can help schedule MRI and patient hung up the phone.

## 2023-09-29 NOTE — TELEPHONE ENCOUNTER
Returned patients call and she is wanting FMLA and I have tried to make her appointment but she does not want to come in. I will reach back out to her about MRI.          ----- Message from Kayce Jeffers sent at 9/29/2023  1:54 PM CDT -----  Contact: Pt  Call was disconnected. Having some phone issues and wants to talk back with the nurse and can be reached at [phone]/thanks/dbw

## 2023-11-24 ENCOUNTER — TELEPHONE (OUTPATIENT)
Dept: GASTROENTEROLOGY | Facility: CLINIC | Age: 63
End: 2023-11-24

## 2023-11-24 NOTE — TELEPHONE ENCOUNTER
Patient is having issues with her new wafers. She was getting half-moon wafers and they were working well but the new company that is supplying her ostomy supplies does not carry that type and she is having trouble with her stoma being in close proximity to her vertical  scar. The wafer will not lie flat and stool gets under it. Do you have any thoughts or do you know any ostomy nurses you could refer her to ?

## 2023-11-28 ENCOUNTER — TELEPHONE (OUTPATIENT)
Dept: HEMATOLOGY/ONCOLOGY | Facility: CLINIC | Age: 63
End: 2023-11-28
Payer: MEDICAID

## 2023-11-28 NOTE — TELEPHONE ENCOUNTER
----- Message from Nicole Lin sent at 11/28/2023 11:18 AM CST -----  Regarding: Orders  Contact: patient  Per phone call with patient, she stated that she is supposed to be schedule for an MRI and no one has contacted her to advised of the date and location.  Please return call at [phone] (home).    Thanks,  SJ

## 2023-11-28 NOTE — TELEPHONE ENCOUNTER
Refaxed orders with approval to the hospital. Called pt and gave her the central scheduling number

## 2023-12-28 ENCOUNTER — TELEPHONE (OUTPATIENT)
Dept: SURGERY | Facility: CLINIC | Age: 63
End: 2023-12-28
Payer: MEDICAID

## 2023-12-28 NOTE — TELEPHONE ENCOUNTER
Order received from [facility] for pt Ostomy supplies. Qty changed. Dr. Torre will sign today and order will be faxed to [facility]. - VL

## 2023-12-28 NOTE — TELEPHONE ENCOUNTER
----- Message from Leyla Amezcua MA sent at 12/27/2023  3:14 PM CST -----    ----- Message -----  From: Joselin Arias  Sent: 12/27/2023   2:14 PM CST  To: Yelitza Demarco Staff    Type:  Needs Medical Advice    Who Called:  Yulissa Munguia  Symptoms (please be specific): -   How long has patient had these symptoms:  -  Pharmacy name and phone #:  -  Would the patient rather a call back or a response via [facility]? CB   Best Call Back Number: [phone]  Additional Information: Needs to speak w/ Leyla about supplies ( waiting on a CB since yesterday)

## 2023-12-28 NOTE — TELEPHONE ENCOUNTER
Returned pt call. Pt states her DME supply company has changed to [employer] and they faxed over a rx for supplies on 12/11/23 that needs Dr. Torre signature. However, our office has not received any faxes from [facility] and pt states she is completely out of bags, wafers and strips. Pt will contact [facility] and confirm they have the correct fax number and have them refax the order for signature. I told pt that I would get w/ my supervisor in the meantime to see how to get her a weeks supply to hold her over until [facility] can fill her rx. Pt voiced understanding - VL

## 2023-12-28 NOTE — TELEPHONE ENCOUNTER
----- Message from Leyla Amezcua MA sent at 12/28/2023 10:23 AM CST -----  Contact: Pt    ----- Message -----  From: Kayce Jeffers  Sent: 12/28/2023   8:28 AM CST  To: Yelitza Demarco Staff    Type:  Patient Returning Call    Who Called: pt   Who Left Message for Patient: nurse   Does the patient know what this is regarding?: pt calling the company and is needing 2 boxes of the strips and write a 2 on the box ( pt is unsure per pt ) instead of 1 and gave a number and if the information is sent right away so that the company can send her strips   Would the patient rather a call back or a response via [facility]?  phone  Best Call Back Number:  [phone]  Additional Information:

## 2023-12-29 ENCOUNTER — TELEPHONE (OUTPATIENT)
Dept: SURGERY | Facility: CLINIC | Age: 63
End: 2023-12-29
Payer: MEDICAID

## 2023-12-29 NOTE — TELEPHONE ENCOUNTER
----- Message from Jud Spear sent at 12/29/2023  1:21 PM CST -----  Contact: self  Patient Yulissa Jackson calling because she states orders have still not been signed/sent for her supplies. Patient states she has been trying to get this taken care of for several days now, and doesn't want to be without her supplies during the holiday/office closures. Patient requesting a call back at [phone] to be provided and update and reassurance that it's being done today.

## 2023-12-29 NOTE — TELEPHONE ENCOUNTER
Before I could return pt call, pt came into the clinic. Pt was informed that signed supply order had been faxed to [facility] at 116pm. Pt was given a copy of the signed order as well as the fax confirmation. Pt voiced understanding and was contacting [facility] on her way out of clinic to have them overnight her supplies. - VL

## 2023-12-29 NOTE — TELEPHONE ENCOUNTER
Returned pt call. Confirmed fax from [facility] was received at 140pm on 12/28/23. Informed pt that I was waiting on [place] to come to office for his signature and the order for her supplies would be faxed to [facility] as soon as we have his signature. - VL

## 2023-12-29 NOTE — TELEPHONE ENCOUNTER
----- Message from Maridorian Nguyen sent at 12/29/2023  8:10 AM CST -----  Contact: JEREMY APONTE [[id number]]  .Type:  Patient Returning Call    Who Called: JEREMY APONTE [[id number]]  Who Left Message for Patient:   Does the patient know what this is regarding?: pt upset b/c paper has yet to be signed and faxed by provider. Pt reports being in contact with nurse Mayer regarding situation. Pt requesting as urgent  Would the patient rather a call back or a response via [facility]? call  Best Call Back Number: .[phone] (home)   Additional Information:

## 2024-02-26 ENCOUNTER — OFFICE VISIT (OUTPATIENT)
Dept: HEMATOLOGY/ONCOLOGY | Facility: CLINIC | Age: 64
End: 2024-02-26
Payer: COMMERCIAL

## 2024-02-26 VITALS
OXYGEN SATURATION: 96 % | HEIGHT: 61 IN | SYSTOLIC BLOOD PRESSURE: 120 MMHG | RESPIRATION RATE: 18 BRPM | WEIGHT: 159.81 LBS | BODY MASS INDEX: 30.17 KG/M2 | HEART RATE: 75 BPM | DIASTOLIC BLOOD PRESSURE: 65 MMHG

## 2024-02-26 DIAGNOSIS — R30.0 DYSURIA: ICD-10-CM

## 2024-02-26 DIAGNOSIS — C20 RECTAL CANCER: Primary | ICD-10-CM

## 2024-02-26 DIAGNOSIS — Z93.3 COLOSTOMY PRESENT: ICD-10-CM

## 2024-02-26 LAB
APPEARANCE, UA: CLEAR
BACTERIA SPEC CULT: ABNORMAL /HPF
BILIRUB UR QL STRIP: NEGATIVE MG/DL
COLOR UR: ABNORMAL
GLUCOSE (UA): NORMAL MG/DL
HGB UR QL STRIP: 250 /UL
KETONES UR QL STRIP: NEGATIVE MG/DL
LEUKOCYTE ESTERASE UR QL STRIP: 25 /UL
NITRITE UR QL STRIP: NEGATIVE
PH UR STRIP: 6 PH (ref 5–9)
PROT UR QL STRIP: ABNORMAL MG/DL
RBC #/AREA URNS HPF: ABNORMAL /HPF (ref 0–2)
SERVICE COMMENT 03: ABNORMAL
SP GR UR STRIP: 1.01 (ref 1–1.03)
SPECIMEN COLLECTION METHOD, URINE: ABNORMAL
SQUAMOUS EPITHELIAL, UA: ABNORMAL /LPF
UROBILINOGEN UR STRIP-ACNC: NORMAL MG/DL
WBC #/AREA URNS HPF: ABNORMAL /HPF (ref 0–5)

## 2024-02-26 PROCEDURE — 99215 OFFICE O/P EST HI 40 MIN: CPT | Mod: S$GLB,,, | Performed by: NURSE PRACTITIONER

## 2024-02-26 NOTE — PROGRESS NOTES
MEDICAL ONCOLOGY FOLLOW UP CONSULTATION NOTE    Patient ID: Yulissa Munguia is a 63 y.o. female.    Chief Complaint: Rectal cancer    HPI:  Patient is a 61-year-old female who was previously diagnosed with rectal cancer about a year back and was seen and evaluated by [facility] with recommendations for chemo XRT for for presumed rectal cancer stage III A. Patient however was reluctant to get any treatment and did not pursue any medical care since then.  She now was referred by her primary care provider after patient was dismissed from [facility].  She presents to our clinic today with her daughters for further evaluation.  She still does not seem to be interested in pursuing chemo or radiation options but would like to have a discussion about her prognosis.  She also reports having a rectal tube placed recently in [place] but I have no records to suggest that it was done recently and for what indication.  She continues to smoke every day and does not seem to actually believe in her cancer diagnosis.      Social History     Socioeconomic History    Marital status:    Tobacco Use    Smoking status: Every Day     Types: Cigarettes    Smokeless tobacco: Never    Tobacco comments:     would be interested in smoking cessation in the future   Substance and Sexual Activity    Alcohol use: Not Currently    Drug use: Not Currently     Past Surgical History:   Procedure Laterality Date    ADENOIDECTOMY       SECTION      CORONARY ANGIOPLASTY WITH STENT PLACEMENT      PORTACATH PLACEMENT      ROBOT-ASSISTED CREATION OF COLOSTOMY USING DA MARTINA XI Left     TONSILLECTOMY      TUBAL LIGATION           Review of systems:  Review of Systems   Constitutional:  Positive for activity change and appetite change. Negative for chills, diaphoresis, fatigue and unexpected weight change.   HENT:  Negative for congestion, facial swelling, hearing loss, mouth sores, trouble swallowing and voice  change.    Eyes:  Negative for photophobia, pain, discharge and itching.   Respiratory:  Negative for apnea, cough, choking, chest tightness and shortness of breath.    Cardiovascular:  Negative for chest pain, palpitations and leg swelling.   Gastrointestinal:  Positive for rectal pain. Negative for abdominal distention, abdominal pain, anal bleeding and blood in stool.   Endocrine: Negative for cold intolerance, heat intolerance, polydipsia and polyphagia.   Genitourinary:  Negative for difficulty urinating, dysuria, flank pain and hematuria.   Musculoskeletal:  Negative for arthralgias, back pain, joint swelling, myalgias, neck pain and neck stiffness.   Skin:  Negative for color change, pallor and wound.   Allergic/Immunologic: Negative for environmental allergies, food allergies and immunocompromised state.   Neurological:  Negative for dizziness, seizures, facial asymmetry, speech difficulty, light-headedness, numbness and headaches.   Hematological:  Negative for adenopathy. Does not bruise/bleed easily.   Psychiatric/Behavioral:  Negative for agitation, behavioral problems, confusion, decreased concentration and sleep disturbance.              Physical Exam  Vitals and nursing note reviewed.   Constitutional:       General: She is not in acute distress.     Appearance: Normal appearance. She is not ill-appearing.   HENT:      Head: Normocephalic and atraumatic.      Nose: No congestion or rhinorrhea.   Eyes:      General: No scleral icterus.     Extraocular Movements: Extraocular movements intact.      Pupils: Pupils are equal, round, and reactive to light.   Cardiovascular:      Rate and Rhythm: Normal rate and regular rhythm.      Pulses: Normal pulses.      Heart sounds: Normal heart sounds. No murmur heard.     No gallop.   Pulmonary:      Effort: Pulmonary effort is normal. No respiratory distress.      Breath sounds: Normal breath sounds. No stridor. No wheezing or rhonchi.   Abdominal:      General:  Bowel sounds are normal. There is no distension.      Palpations: There is no mass.      Tenderness: There is no abdominal tenderness. There is no guarding.   Musculoskeletal:         General: No swelling, tenderness, deformity or signs of injury. Normal range of motion.      Cervical back: Normal range of motion and neck supple. No rigidity. No muscular tenderness.      Right lower leg: No edema.      Left lower leg: No edema.   Skin:     General: Skin is warm.      Coloration: Skin is not jaundiced or pale.      Findings: No bruising or lesion.   Neurological:      General: No focal deficit present.      Mental Status: She is alert and oriented to person, place, and time.      Cranial Nerves: No cranial nerve deficit.      Sensory: No sensory deficit.      Motor: No weakness.      Gait: Gait normal.   Psychiatric:         Mood and Affect: Mood normal.         Behavior: Behavior normal.         Thought Content: Thought content normal.       Vitals:    24 0859   BP: 120/65   Pulse: 75   Resp: 18       Body surface area is 1.77 meters squared.    IMAGING:    [facility] UNKNOWN RAD EAP                                RADIOLOGY REPORT        PT NAME: JEREMY APONTE      [facility]     : 1960 F 63             [address] [facility]    ACCT: [id number]                                              [facility] Rec #: [id number]                                        [id number]    [facility] [place]             Procedure: PELVIS WO/W CONTRAST    REQUISITION #: [id number]      REPORT #: [id number]           DATE OF EXAM: 12/15/23    TIME OF EXAM: 0945       PELVIS WO/W CONTRAST        Indication: MALIGNANT NEOPLASM OF RECTUM: MALIGNANT NEOPLASM OF RECTUM        Comaprison:  3/28/2023        TECHNIQUE:        Multiplanar MRI of the pelvis was performed utilizing pre and post   intravenous contrast standard sequences.        Discussion:        Best demonstrated on series 5, image  22 are residual small foci of use   within the wall of the lower third of the rectum at the anorectal junction.    The foci of mucinous smaller and less numerous than on the prior exam. No   measurable residual solid mass is seen. No tumor is seen to involve the   mesorectal fascia.        There is mild post radiation edema of presacral fascial planes. There is no    evidence of tumor deposit in the mesorectal space. There is no evidence of   mesorectal or extra-mesorectal lymphadenopathy. There is no evidence of   ascites. There is no suspicious bone lesion. There is a left lower quadrant    colostomy.        Redemonstrated are small subchondral cysts adjacent to the sacroiliac joints   left more pronounced than right, most compatible with osteoarthritis.        Impression:        Persistent foci of mucin of the lower third of the rectum at the anorectal   junction, decreased from the prior exam. No measurable solid tumor is seen.    Tumor regression grade 2.        No evidence of involvement of the mesorectal envelope or pelvic metastatic   disease.        Signer Name: Mookie Donahue MD    Signed: 12/15/2023 4:42 PM CST    ()        DICTATING PHYSICIAN:  MOOKIE DONAHUE MD                   Date Dictated: 12/15/23 0824        Signed By:  MOOKIE DONAHUE MD     Date Signed:  12/15/23 1647     CC: FRANCIA TORRES NP ; FRANCIA TORRES NP      ADMITTING PHYSICIAN:                                                                                                    ORDERING PHY: FRANCIA TORRES NP                                                                                                                                                      ATTENDING PHY: FRANCIA TORRES NP    Patient Status:  REG CLI    Admit Service Date: 12/15/23       [facility] UNKNOWN RAD EAP                                RADIOLOGY REPORT        PT NAME: JEREMY APONTE      [facility]     : 1960 F 63             [address]  [facility]    ACCT: [id number]                                              [facility] Rec #: [id number]                                        [id number]    [facility]             Procedure: ABDOMEN WWO    REQUISITION #: [id number]      REPORT #: [id number]           DATE OF EXAM: 12/15/23    TIME OF EXAM: 0900       ABDOMEN WWO        Indication: MALIGNANT NEOPLASM OF RECTUM: MALIGNANT NEOPLASM OF RECTUM        Comaprison:  MRI abdomen 5/28/2023        TECHNIQUE:        Multiplanar MRI of the abdomen was performed utilizing standard pre and post   intravenous contrast sequences.        Discussion:        The liver is normal in signal and morphology. Diffusion demonstrates no   evidence of liver mass. The spleen, pancreas, adrenals are unremarkable. The   gallbladder and biliary tree are unremarkable. There is no evidence of   ascites or lymphadenopathy.        There is a 10 mm stone in the right renal pelvis. There is no evidence of   hydronephrosis or solid renal mass. There is a left lower quadrant   colostomy.        Impression:        No acute or suspicious abnormalities.        Signer Name: Mookie Donahue MD    Signed: 12/15/2023 4:31 PM CST    ()        DICTATING PHYSICIAN:  MOOKIE DONAHUE MD                   Date Dictated: 12/15/23 0824        Signed By:  MOOKIE DONAHUE MD     Date Signed:  12/15/23 1635     CC: FRANCIA TORRES NP ; FRANCIA TORRES NP      ADMITTING PHYSICIAN:                                                                                                    ORDERING PHY: FRANCIA TORRES NP                                                                                                                                                      ATTENDING PHY: FRANCIA TORRES NP    Patient Status:  REG CLI    Admit Service Date: 12/15/23                Assessment/Plan:      ECOG 1     1. Rectal Cancer:    == Initially diagnosed in July 2021 as stage IIIA rectal cancer.   She has not followed and not pursued any treatment since then as she is skeptical about chemotherapy and side effects of radiation.  She believes an alternate treatment options which she has likely been doing since that time.  After recent dismissal from [facility] she presents to us for further discussion of treatment and management options.  == 7/14/22:  Patient underwent PET scan 07/08/2022 which showed continued thickening of the rectal wall extending into the anal region with increased radiotracer uptake.  This continues to extend over about 6 cm in length.  Increased radiotracer uptake remains present in the area with an SUV of approximately 22.5 compared 24.4 on the previous examination.  Indistinctness of the fat planes posteriorly remain present consistent with extra colonic extension.  There continues to be hypermetabolic lymph nodes in the inguinal region bilaterally short axis diameter is on the order of about 12 mm are present with SUVs on the order of approximately 3.4.  A mildly hypermetabolic lymph node remains present along the left sidewall of the pelvis having short axis diameter of approximately 7 mm today versus 10 mm on the previous examination.  The SUV in this area is approximately 2.1 versus 5.8 on previous exam. Considering her original diagnosis as Stage IIIA with outside oncologist, this PET scan shows it is likely, T4a with extracolonic extension and N1b with 2-3 regional LN involvement. I discussed with her about chemo-XRT  and would like to talk to radiation oncology first before she decides to pursue any treatment. I will hence make referral to Rad-onc in this regard  ==07/21/2022: Patient reports that she would like to pursue chemoradiaton. She has appointment today for radiation evaluation with Dr. Contreras. I discussed with Dr. Herndon and if she is candidate for radiation plan is for concurrent XRT with 5-fu. She will need mediport placement. Referral placed.    ==07/28/2022:  Pt here today to discuss upcoming chemotherapy, side effect profile, risk versus benefits, and expected outcomes have been discussed today. All questions and concerns answered and consents have been signed.After discussion with JACINTO and Dr. Herndon plan is to proceed with concurrent XRT and Xeloda. Xeloda dosage calculated with only 500mg tablets due to patient getting very confused with medications and having difficulty repeating proper dosage back after multiple attempts at teaching. She is poor candidate receive combination of 500mg and 150mg and will likely confuse them and take improper dose. She also reports taking too many to name or remember herbs and supplements which are not on medication list. I instructed her to contact clinic with list to ensure no interactions.   == 8/2022 - 9/13/22: completed XRT/xeloda. Based on NCCN Guidelines we will proceed with Capox for 12-16 weeks.  == 11/8/22 - 3/16/2023: completed 6 cycles of xelox with diverting colostomy on 11/22/22 with Dr Torre  == 3/16/23:  Here today to review recent CT chest abdomen pelvis showing no evidence of rectal cancer noted.  A questionable 1.6 cm right renal pelvis masses noted recommending MRI abdomen with and without orders have been placed today.  Will refer patient back to Dr. Lomeli for evaluation in [facility]  == 3/23/23:  Patient walked into clinic today to discuss recent visit with Dr. Lomeli, currently she does not desire colorectal surgery after completion of neoadjuvant chemotherapy.  Today we discussed the importance of surgical resection as completion of therapy related to her diagnoses.  She is most concerned about having her rectal tube removed as it is causing her discomfort.  Explained that we concur with Dr. Lomeli recommended surgical resection prior to rectal tube removal.  She is willing to obtain a 2nd opinion with additional colorectal surgeon to discuss surgical options after her MRI is resulted  which is scheduled for Tuesday March 26, 2023 but states she is still hesitant to undergo surgery.  == 4/6/23: here today to review MRI abdomen/pelvis showing 3.7 cm rectal mass abutting the posterior vagina near anal verge  with no lymph nodes noted, long discussion with patient and daughter today regarding recommendation of surgical intervention or know rectal mass to complete curative intent care. She is unsure if she will pursue surgery but would like to have her rectal tube removed as it is causing her discomfort. Referral to Dr Barksdale for second opinion pending. Incidental Right Renal Pelvis mass 1.2 cm, she has an appointment with Urology in May 2023.    5/4/23:  Here today after recent visit with 2nd colorectal surgeon who recommends surgical resection, patient states she has discussed with family and has decided she will not undergo any type of surgical resection.  She is aware of the risk of localized recurrence  2/26/24: doing well, missed several appointments due to loss of insurance, review of MRI abd/pelvis show small residual foci in lowe 1/3 of rectum, pt reports still has rectal tube but will order PET/CT to r/o recurrence. Denies any difficulties with stoma/colostomy. MRI abd shows stable right renal cyst    2. Dysuria  New onset, worried she has a UTI, will check u/a today     3. Constipation  resolved      Plan:  Completed bautista-adj chemotherapy, declines curative surgery after 2 separate evaluations with colorectal surgeons  Abnormal foci of rectum on MRI plevis, PET/CT r/o met disease      Total time spent in counseling and discussion about further management options including relevant lab work, treatment,  prognosis, medications and intended side effects was more than 60 minutes. More than 50% of the time was spent on counseling and coordination of care.  This includes face to face time and non-face to face time preparing to see the patient (eg, review of tests), Obtaining and/or reviewing  separately obtained history, Documenting clinical information in the electronic or other health record, Independently interpreting resultsand communicating results to the patient/family/caregiver, or Care coordination.

## 2024-03-04 ENCOUNTER — TELEPHONE (OUTPATIENT)
Dept: UROLOGY | Facility: CLINIC | Age: 64
End: 2024-03-04
Payer: COMMERCIAL

## 2024-03-04 NOTE — TELEPHONE ENCOUNTER
Contacted,  pt will walk in tomorrow for drop off. BJP    ----- Message from Vaishali Jeong sent at 3/4/2024  2:57 PM CST -----  Contact: self  Type:  Needs Medical Advice    Who Called: Yulissa Munguia  Symptoms (please be specific): UTI   How long has patient had these symptoms:  x 1 day  Pharmacy name and phone #:    [id number] [place], [place] - [address] [address]  Phone: [phone] Fax: [phone]      Would the patient rather a call back or a response via [facility]?   Best Call Back Number: [phone]  Additional Information: bactrim?

## 2024-03-05 ENCOUNTER — CLINICAL SUPPORT (OUTPATIENT)
Dept: UROLOGY | Facility: CLINIC | Age: 64
End: 2024-03-05
Payer: COMMERCIAL

## 2024-03-05 DIAGNOSIS — N39.0 URINARY TRACT INFECTION WITHOUT HEMATURIA, SITE UNSPECIFIED: Primary | ICD-10-CM

## 2024-03-05 LAB
BILIRUBIN, UA POC OHS: NEGATIVE
BLOOD, UA POC OHS: ABNORMAL
CLARITY, UA POC OHS: CLEAR
COLOR, UA POC OHS: YELLOW
GLUCOSE, UA POC OHS: NEGATIVE
KETONES, UA POC OHS: NEGATIVE
LEUKOCYTES, UA POC OHS: ABNORMAL
NITRITE, UA POC OHS: NEGATIVE
PH, UA POC OHS: 7.5
PROTEIN, UA POC OHS: NEGATIVE
SPECIFIC GRAVITY, UA POC OHS: 1.02
UROBILINOGEN, UA POC OHS: 0.2

## 2024-03-05 PROCEDURE — 81003 URINALYSIS AUTO W/O SCOPE: CPT | Mod: QW,S$GLB,, | Performed by: UROLOGY

## 2024-03-08 ENCOUNTER — TELEPHONE (OUTPATIENT)
Dept: UROLOGY | Facility: CLINIC | Age: 64
End: 2024-03-08
Payer: COMMERCIAL

## 2024-03-08 ENCOUNTER — CLINICAL SUPPORT (OUTPATIENT)
Dept: UROLOGY | Facility: CLINIC | Age: 64
End: 2024-03-08
Payer: COMMERCIAL

## 2024-03-08 DIAGNOSIS — N39.0 URINARY TRACT INFECTION WITHOUT HEMATURIA, SITE UNSPECIFIED: Primary | ICD-10-CM

## 2024-03-08 DIAGNOSIS — N23 KIDNEY PAIN: Primary | ICD-10-CM

## 2024-03-08 LAB
BILIRUBIN, UA POC OHS: NEGATIVE
BLOOD, UA POC OHS: ABNORMAL
CLARITY, UA POC OHS: ABNORMAL
COLOR, UA POC OHS: YELLOW
GLUCOSE, UA POC OHS: NEGATIVE
KETONES, UA POC OHS: ABNORMAL
LEUKOCYTES, UA POC OHS: NEGATIVE
NITRITE, UA POC OHS: NEGATIVE
PH, UA POC OHS: 6
PROTEIN, UA POC OHS: 30
SPECIFIC GRAVITY, UA POC OHS: >=1.03
URINE CULTURE, ROUTINE: NORMAL
UROBILINOGEN, UA POC OHS: 0.2

## 2024-03-08 PROCEDURE — 81003 URINALYSIS AUTO W/O SCOPE: CPT | Mod: QW,S$GLB,, | Performed by: FAMILY MEDICINE

## 2024-03-08 NOTE — TELEPHONE ENCOUNTER
Spoke with pt and informed of results.     ----- Message from Michelle Rock NP sent at 3/8/2024  9:09 AM CST -----  Mixed growth in a urine culture is consistant with normal   urogenital/urethral and/or colonizing bacterial mima.  There are   several different types of bacteria present, which is usually   indicative of contamination of the urine.     Instruct patient to repeat urine drop-off if symptoms of infection continue or worsen.

## 2024-03-08 NOTE — PROGRESS NOTES
Pt proceeded to clinic for ua drop off. We reviewed MRI that was done with Patti Webb NP pt has a 10 mm stone in the right renal pelvis. We will go ahead and order CT stone. Pt aware and verbalized understanding. Order placed.

## 2024-03-14 ENCOUNTER — TELEPHONE (OUTPATIENT)
Dept: UROLOGY | Facility: CLINIC | Age: 64
End: 2024-03-14

## 2024-03-14 NOTE — TELEPHONE ENCOUNTER
Spoke with pt and informed of results.     ----- Message from Michelle Rock NP sent at 3/14/2024 10:36 AM CDT -----   Please notify patient of CT results. No acute abdominal or pelvic abnormality . No urolithiasis or obstructive    uropathy. Kidneys are normal in size and density. No evidence of solid mass or hydronephrosis The bladder is normal in appearance.

## 2024-03-27 LAB
ALBUMIN SERPL BCP-MCNC: 3.4 G/DL (ref 3.4–5)
ALBUMIN/GLOBULIN RATIO: 0.92 RATIO (ref 1.1–1.8)
ALP SERPL-CCNC: 78 U/L (ref 46–116)
ALT SERPL W P-5'-P-CCNC: 26 U/L (ref 12–78)
ANION GAP SERPL CALC-SCNC: 6 MMOL/L (ref 3–11)
AST SERPL-CCNC: 14 U/L (ref 15–37)
BASOPHILS NFR BLD: 0.7 % (ref 0–3)
BILIRUB SERPL-MCNC: 0.4 MG/DL (ref 0–1)
BUN SERPL-MCNC: 12 MG/DL (ref 7–18)
BUN/CREAT SERPL: 24.48 RATIO (ref 7–18)
CALCIUM SERPL-MCNC: 9.2 MG/DL (ref 8.8–10.5)
CHLORIDE SERPL-SCNC: 103 MMOL/L (ref 100–108)
CO2 SERPL-SCNC: 32 MMOL/L (ref 21–32)
CREAT SERPL-MCNC: 0.49 MG/DL (ref 0.55–1.02)
EOSINOPHIL NFR BLD: 3.8 % (ref 1–3)
ERYTHROCYTE [DISTWIDTH] IN BLOOD BY AUTOMATED COUNT: 13.7 % (ref 12.5–18)
GFR ESTIMATION: > 60
GLOBULIN: 3.7 G/DL (ref 2.3–3.5)
GLUCOSE SERPL-MCNC: 84 MG/DL (ref 70–110)
HCT VFR BLD AUTO: 46.4 % (ref 37–47)
HGB BLD-MCNC: 14.8 G/DL (ref 12–16)
LYMPHOCYTES NFR BLD: 21.3 % (ref 25–40)
MCH RBC QN AUTO: 29.8 PG (ref 27–31.2)
MCHC RBC AUTO-ENTMCNC: 31.9 G/DL (ref 31.8–35.4)
MCV RBC AUTO: 93.5 FL (ref 80–97)
MONOCYTES NFR BLD: 13.8 % (ref 1–15)
NEUTROPHILS # BLD AUTO: 3.27 10*3/UL (ref 1.8–7.7)
NEUTROPHILS NFR BLD: 59.7 % (ref 37–80)
NUCLEATED RED BLOOD CELLS: 0 %
PLATELETS: 350 10*3/UL (ref 142–424)
POTASSIUM SERPL-SCNC: 4.7 MMOL/L (ref 3.6–5.2)
PROT SERPL-MCNC: 7.1 G/DL (ref 6.4–8.2)
RBC # BLD AUTO: 4.96 10*6/UL (ref 4.2–5.4)
SODIUM BLD-SCNC: 141 MMOL/L (ref 135–145)
WBC # BLD: 5.5 10*3/UL (ref 4.6–10.2)

## 2024-04-30 ENCOUNTER — TELEPHONE (OUTPATIENT)
Dept: HEMATOLOGY/ONCOLOGY | Facility: CLINIC | Age: 64
End: 2024-04-30
Payer: COMMERCIAL

## 2024-04-30 DIAGNOSIS — C20 RECTAL CANCER: Primary | ICD-10-CM

## 2024-04-30 NOTE — TELEPHONE ENCOUNTER
Spoke to patient and advised that updated order and request for authorization of MRI have been submitted for approval.  Once approved it will be sent to [facility] for scheduling. Patient advised to contact clinic once MRI has been scheduled so we can reschedule her follow up to review results.  Patient expressed understanding.      AZAR  4/30/24 @11:24pm    ----- Message from Shruthi Lanza sent at 4/30/2024  8:52 AM CDT -----  Contact: zoë Duenas is calling regarding MRI.  She states she's not sure if the doctor wants to see being that she hasn't got the orders completed due to insurance issues.  Please give her a call back at [phone]

## 2024-05-01 ENCOUNTER — TELEPHONE (OUTPATIENT)
Dept: HEMATOLOGY/ONCOLOGY | Facility: CLINIC | Age: 64
End: 2024-05-01
Payer: COMMERCIAL

## 2024-05-03 ENCOUNTER — TELEPHONE (OUTPATIENT)
Dept: HEMATOLOGY/ONCOLOGY | Facility: CLINIC | Age: 64
End: 2024-05-03
Payer: COMMERCIAL

## 2024-05-03 NOTE — TELEPHONE ENCOUNTER
----- Message from Edwina Corrigan sent at 5/3/2024  8:19 AM CDT -----  Contact: Ene with [facility]  Type: Staff Message    Caller: Ene with [facility]  Call Back Number: [phone]  Nature of the Call: Needing clarification on MRI orders   Additional Information: na

## 2024-05-03 NOTE — TELEPHONE ENCOUNTER
Spoke with pt. She wasn't aware when her next MRI was due. I did voice next MRI is due in June. Attempted to call Ene at [facility] multiple times, no answer. Did leave vm.

## 2024-06-05 ENCOUNTER — TELEPHONE (OUTPATIENT)
Dept: UROLOGY | Facility: CLINIC | Age: 64
End: 2024-06-05
Payer: COMMERCIAL

## 2024-06-05 NOTE — TELEPHONE ENCOUNTER
Dr. Contreras reached out about patient needing to see Dr. Moleler for cystoscope to evaluate for bladder tumor. I have contacted pt to schedule, she was leaving Dr. Schumacher office and will call back. Pt scheduled for 6/13  @ 10:00.

## 2024-06-10 ENCOUNTER — TELEPHONE (OUTPATIENT)
Dept: SURGERY | Facility: CLINIC | Age: 64
End: 2024-06-10
Payer: COMMERCIAL

## 2024-06-10 NOTE — TELEPHONE ENCOUNTER
----- Message from Hannah Molina sent at 6/10/2024  9:39 AM CDT -----  Contact: self  Patient is requesting a call back regarding needing supplies updated. Please call back at [phone]    Pt was contacted in regards to reordering ostomy supplies. Pt stated she is currently still using [employer] but will be switching companies soon stated I will order and fax over the form to [employer]

## 2024-06-13 ENCOUNTER — OFFICE VISIT (OUTPATIENT)
Dept: UROLOGY | Facility: CLINIC | Age: 64
End: 2024-06-13
Payer: COMMERCIAL

## 2024-06-13 VITALS
DIASTOLIC BLOOD PRESSURE: 71 MMHG | SYSTOLIC BLOOD PRESSURE: 135 MMHG | BODY MASS INDEX: 30.4 KG/M2 | HEART RATE: 79 BPM | WEIGHT: 161 LBS | HEIGHT: 61 IN

## 2024-06-13 DIAGNOSIS — N28.89 RENAL MASS: Primary | ICD-10-CM

## 2024-06-13 PROCEDURE — 99215 OFFICE O/P EST HI 40 MIN: CPT | Mod: S$GLB,,, | Performed by: UROLOGY

## 2024-06-13 RX ORDER — HYDROCODONE BITARTRATE AND ACETAMINOPHEN 5; 325 MG/1; MG/1
1 TABLET ORAL
COMMUNITY
Start: 2024-04-30

## 2024-06-13 RX ORDER — MUPIROCIN 20 MG/G
OINTMENT TOPICAL
COMMUNITY
Start: 2024-04-16 | End: 2024-06-17

## 2024-06-13 NOTE — PROGRESS NOTES
Subjective:       Patient ID: Yulissa Munguia is a 63 y.o. female.    Chief Complaint: No chief complaint on file.      HPI:  63-year-old female with known rectal cancer she is received radiation and chemo palliative.  She has a right renal pelvic lesion which was seen on MRI over year ago she was set up for right diagnostic ureteroscopy but this never happened she also has gross hematuria and suspicion of rectal cancer in the bladder she was sent back to clinic for evaluation with cystoscopy but never had diagnostic right ureteroscopy    Past Medical History:   Past Medical History:   Diagnosis Date    Cancer of anal canal     Encounter for blood transfusion     Hypertension        Past Surgical Historical:   Past Surgical History:   Procedure Laterality Date    ADENOIDECTOMY       SECTION      CORONARY ANGIOPLASTY WITH STENT PLACEMENT      PORTACATH PLACEMENT      ROBOT-ASSISTED CREATION OF COLOSTOMY USING DA MARTINA XI Left     TONSILLECTOMY      TUBAL LIGATION          Medications:   Medication List with Changes/Refills   Current Medications    ASPIRIN 81 MG CHEW    Take 81 mg by mouth.    HYDROCODONE-ACETAMINOPHEN (NORCO)  MG PER TABLET    Take 1 tablet by mouth every 8 (eight) hours as needed for Pain.    HYDROCODONE-ACETAMINOPHEN (NORCO) 5-325 MG PER TABLET    Take 1 tablet by mouth every 4 to 6 hours as needed.    MUPIROCIN (BACTROBAN) 2 % OINTMENT        PROMETHAZINE (PHENERGAN) 25 MG TABLET    TAKE ONE TABLET BY MOUTH EVERY EIGHT HOURS    TURMERIC 400 MG CAP    Take by mouth.        Past Social History:   Social History     Socioeconomic History    Marital status:    Tobacco Use    Smoking status: Every Day     Types: Cigarettes    Smokeless tobacco: Never    Tobacco comments:     would be interested in smoking cessation in the future   Substance and Sexual Activity    Alcohol use: Not Currently    Drug use: Not Currently     Social Determinants of Health     Financial Resource Strain:  Low Risk  (3/30/2022)    [facility]    Overall Financial Resource Strain (CARDIA)     Difficulty of Paying Living Expenses: Not hard at all   Food Insecurity: No Food Insecurity (3/30/2022)    [facility]    Hunger Vital Sign     Worried About Running Out of Food in the Last Year: Never true     Ran Out of Food in the Last Year: Never true   Transportation Needs: No Transportation Needs (3/30/2022)    [facility]    PRAPARE - Transportation     Lack of Transportation (Medical): No     Lack of Transportation (Non-Medical): No   Housing Stability: Unknown (3/30/2022)    [facility]    Housing Stability Vital Sign     Unable to Pay for Housing in the Last Year: No     Number of Places Lived in the Last Year: 0       Allergies: Review of patient's allergies indicates:  No Known Allergies     Family History:   Family History   Problem Relation Name Age of Onset    Esophageal cancer Mother      Hypertension Mother      Colon cancer Father          Review of Systems:  Review of Systems   Constitutional:  Negative for activity change and appetite change.   HENT:  Negative for congestion and dental problem.    Respiratory:  Negative for chest tightness and shortness of breath.    Cardiovascular:  Negative for chest pain.    Gastrointestinal:  Negative for abdominal distention and abdominal pain.   Genitourinary:  Negative for decreased urine volume, difficulty urinating, dyspareunia, dysuria, enuresis, flank pain, frequency, genital sores, hematuria, pelvic pain and urgency.   Musculoskeletal:  Negative for back pain and neck pain.   Allergic/Immunologic: Negative for immunocompromised state.   Neurological:  Negative for dizziness.   Hematological:  Negative for adenopathy.   Psychiatric/Behavioral:  Negative for agitation, behavioral problems and confusion.        Physical Exam:  Physical Exam  Constitutional:       Appearance: She is well-developed.   HENT:      Head: Normocephalic and atraumatic.      Right Ear: External ear normal.      Left Ear: External ear normal.   Eyes:      Conjunctiva/sclera: Conjunctivae normal.   Neck:      Vascular: No JVD.   Cardiovascular:      Rate and Rhythm: Normal rate and regular rhythm.   Pulmonary:      Effort: Pulmonary effort is normal. No respiratory distress.      Breath sounds: Normal breath sounds. No wheezing.   Abdominal:      General: There is no distension.      Palpations: Abdomen is soft.      Tenderness: There is no abdominal tenderness. There is no rebound.   Musculoskeletal:         General: Normal range of motion.      Cervical back: Normal range of motion.   Skin:     General: Skin is warm and dry.      Findings: No erythema or rash.   Neurological:      Mental Status: She is alert and oriented to person, place, and time.   Psychiatric:         Behavior: Behavior normal.         Assessment/Plan:       Problem List Items Addressed This Visit    None  Visit Diagnoses       Renal mass    -  Primary               63-year-old female with rectal cancer likely invading the bladder also 1.5 cm renal pelvic mass which has not been worked up:   We will proceed to the operating room for cystoscopy and right diagnostic ureteroscopy to evaluate the renal pelvic lesion

## 2024-06-17 ENCOUNTER — CLINICAL SUPPORT (OUTPATIENT)
Dept: UROLOGY | Facility: CLINIC | Age: 64
End: 2024-06-17
Payer: COMMERCIAL

## 2024-06-17 ENCOUNTER — TELEPHONE (OUTPATIENT)
Dept: UROLOGY | Facility: CLINIC | Age: 64
End: 2024-06-17

## 2024-06-17 DIAGNOSIS — N28.89 RENAL MASS: Primary | ICD-10-CM

## 2024-06-17 NOTE — PROGRESS NOTES
Here for Diagnostic R URS education.  Medications, allergies and pharmacy verified.  Pre/post op instructions reviewed and discussed.  Questions asked and answered.  Consent signed.  Written instructions given to patient.  Encouraged to call with questions or concerns

## 2024-06-17 NOTE — TELEPHONE ENCOUNTER
Pt calling to reschedule nurse visit. She wanted to reschedule for today. Pt will be in at 2:30.    ----- Message from Birgit Gay sent at 6/17/2024 11:21 AM CDT -----  Contact: Patient  Patient called to consult with nurse or staff regarding her appointment that was scheduled today. She states she wasn't able to make the nurse visit but still wanted to come in today. She would like a call back as soon as possible and can be reached at  [phone]. Thanks/MR

## 2024-06-18 LAB
ANION GAP SERPL CALC-SCNC: 3 MMOL/L (ref 3–11)
APPEARANCE, UA: CLEAR
BACTERIA SPEC CULT: ABNORMAL /HPF
BASOPHILS NFR BLD: 1 % (ref 0–3)
BILIRUB UR QL STRIP: NEGATIVE MG/DL
BUN SERPL-MCNC: 11 MG/DL (ref 7–18)
BUN/CREAT SERPL: 20.37 RATIO (ref 7–18)
CALCIUM SERPL-MCNC: 9 MG/DL (ref 8.8–10.5)
CHLORIDE SERPL-SCNC: 105 MMOL/L (ref 100–108)
CO2 SERPL-SCNC: 32 MMOL/L (ref 21–32)
COLOR UR: ABNORMAL
CREAT SERPL-MCNC: 0.54 MG/DL (ref 0.55–1.02)
EOSINOPHIL NFR BLD: 4.6 % (ref 1–3)
ERYTHROCYTE [DISTWIDTH] IN BLOOD BY AUTOMATED COUNT: 14 % (ref 12.5–18)
GFR ESTIMATION: > 60
GLUCOSE (UA): NORMAL MG/DL
GLUCOSE SERPL-MCNC: 84 MG/DL (ref 70–110)
HCT VFR BLD AUTO: 43.9 % (ref 37–47)
HGB BLD-MCNC: 14.5 G/DL (ref 12–16)
HGB UR QL STRIP: 10 /UL
KETONES UR QL STRIP: NEGATIVE MG/DL
LEUKOCYTE ESTERASE UR QL STRIP: NEGATIVE /UL
LYMPHOCYTES NFR BLD: 20.1 % (ref 25–40)
MCH RBC QN AUTO: 29.4 PG (ref 27–31.2)
MCHC RBC AUTO-ENTMCNC: 33 G/DL (ref 31.8–35.4)
MCV RBC AUTO: 89 FL (ref 80–97)
MONOCYTES NFR BLD: 10.9 % (ref 1–15)
MUCUS URINE: ABNORMAL /LPF
NEUTROPHILS # BLD AUTO: 3.77 10*3/UL (ref 1.8–7.7)
NEUTROPHILS NFR BLD: 62.6 % (ref 37–80)
NITRITE UR QL STRIP: NEGATIVE
NUCLEATED RED BLOOD CELLS: 0 %
PH UR STRIP: 6 PH (ref 5–9)
PLATELETS: 324 10*3/UL (ref 142–424)
POTASSIUM SERPL-SCNC: 4.5 MMOL/L (ref 3.6–5.2)
PROT UR QL STRIP: ABNORMAL MG/DL
RBC # BLD AUTO: 4.93 10*6/UL (ref 4.2–5.4)
RBC #/AREA URNS HPF: ABNORMAL /HPF (ref 0–2)
SERVICE COMMENT 03: ABNORMAL
SODIUM BLD-SCNC: 140 MMOL/L (ref 135–145)
SP GR UR STRIP: 1.02 (ref 1–1.03)
SPECIMEN COLLECTION METHOD, URINE: ABNORMAL
SQUAMOUS EPITHELIAL, UA: ABNORMAL /LPF
UROBILINOGEN UR STRIP-ACNC: NORMAL MG/DL
WBC # BLD: 6 10*3/UL (ref 4.6–10.2)
WBC #/AREA URNS HPF: ABNORMAL /HPF (ref 0–5)

## 2024-06-20 ENCOUNTER — TELEPHONE (OUTPATIENT)
Dept: UROLOGY | Facility: CLINIC | Age: 64
End: 2024-06-20
Payer: COMMERCIAL

## 2024-06-20 NOTE — TELEPHONE ENCOUNTER
Pt calling to reschedule diagnositic R- URS. Message sent to derian.    ----- Message from Vivien Coleman sent at 6/20/2024 12:44 PM CDT -----  States she would like to speak to the nurse about her surgery on Monday. Please call pt  [phone]. Thank you

## 2024-07-30 ENCOUNTER — TELEPHONE (OUTPATIENT)
Dept: SURGERY | Facility: CLINIC | Age: 64
End: 2024-07-30
Payer: COMMERCIAL

## 2024-07-30 NOTE — TELEPHONE ENCOUNTER
----- Message from Ashley Smallwood LPN sent at 7/29/2024  3:58 PM CDT -----  Contact: Reagan good    ----- Message -----  From: Candy Abraham  Sent: 7/29/2024   1:25 PM CDT  To: Yelitza Demarco Staff    Kaitlyn portillo/[employer] calling for verbal p/a for colostomy supplies and she can be reached at [phone] and fax number [phone].    Thanks,      [facility] was left a vm stated to give office a call back

## 2024-07-31 ENCOUNTER — TELEPHONE (OUTPATIENT)
Dept: SURGERY | Facility: CLINIC | Age: 64
End: 2024-07-31
Payer: COMMERCIAL

## 2024-07-31 NOTE — TELEPHONE ENCOUNTER
----- Message from Vaishali Jeong sent at 7/31/2024  1:19 PM CDT -----  Contact: che  Type:  Patient Returning Call    Who Called:che  Who Left Message for Patient:josé miguel  Does the patient know what this is regarding?:appt  Would the patient rather a call back or a response via [facility]? cb  Best Call Back Number:  Additional Information: n/a    Che/ [employer] stated that colostomy supplies are need and that I can fax over ov visit notes as soon as Dr. Torre is in office to sign papers that were faxed to office

## 2024-09-12 ENCOUNTER — TELEPHONE (OUTPATIENT)
Dept: SURGERY | Facility: CLINIC | Age: 64
End: 2024-09-12
Payer: COMMERCIAL

## 2024-09-12 NOTE — TELEPHONE ENCOUNTER
----- Message from Mary Carrera sent at 9/12/2024  1:26 PM CDT -----  Patient returning call please call her back at [phone]    Pt was contacted in regards to making an appt for wound around stoma stated she can come in 9/23 @ 338

## 2024-09-12 NOTE — TELEPHONE ENCOUNTER
----- Message from Nicole Lin sent at 9/12/2024 10:41 AM CDT -----  Regarding: Appointment  Contact: patient  Per phone call with patient she would like to schedule an appointment to see the physician regarding a little wound that surface close to her stoma.  Please return call at [phone].    Thanks,  SJ    Pt was contacted to schedule appt stated she can call office back in vm

## 2024-09-23 ENCOUNTER — TELEPHONE (OUTPATIENT)
Dept: SURGERY | Facility: CLINIC | Age: 64
End: 2024-09-23

## 2024-09-23 NOTE — TELEPHONE ENCOUNTER
----- Message from Hannah Molina sent at 9/23/2024  8:10 AM CDT -----  Contact: self  Patient is requesting a call back regarding rescheduling appt for today . Please call back at [phone]    Pt stated she would like to r.s due to hurting herself this past weekend stated I can r/s her for 9/30 @ 335

## 2024-09-30 ENCOUNTER — OFFICE VISIT (OUTPATIENT)
Dept: SURGERY | Facility: CLINIC | Age: 64
End: 2024-09-30
Payer: COMMERCIAL

## 2024-09-30 VITALS — BODY MASS INDEX: 31.15 KG/M2 | WEIGHT: 165 LBS | HEIGHT: 61 IN

## 2024-09-30 DIAGNOSIS — C20 RECTAL CANCER: Primary | ICD-10-CM

## 2024-09-30 PROCEDURE — 99214 OFFICE O/P EST MOD 30 MIN: CPT | Mod: S$PBB,,, | Performed by: SURGERY

## 2024-09-30 RX ORDER — CYCLOBENZAPRINE HCL 10 MG
10 TABLET ORAL 3 TIMES DAILY
COMMUNITY
Start: 2024-09-24

## 2024-09-30 RX ORDER — DICLOFENAC SODIUM 75 MG/1
75 TABLET, DELAYED RELEASE ORAL 2 TIMES DAILY PRN
COMMUNITY
Start: 2024-09-26

## 2024-09-30 NOTE — PROGRESS NOTES
Subjective:       Patient ID: Yulissa Munguia is a 63 y.o. female.    Chief Complaint: Other (Wound below stoma )      61-year-old female who is well known to me, patient has diverting end colostomy secondary to rectal cancer.  Patient states that the other day she accidentally scratched around her ostomy, she is concerned about the wound wanted it to be evaluated.  It has not been draining in his marginally tender      Review of Systems   Constitutional:  Negative for chills and fever.   HENT:  Negative for nasal congestion and rhinorrhea.    Eyes:  Negative for discharge and visual disturbance.   Respiratory:  Negative for shortness of breath and wheezing.    Cardiovascular:  Negative for chest pain and palpitations.   Gastrointestinal: Negative.  Negative for abdominal distention, abdominal pain, anal bleeding, blood in stool, constipation, diarrhea, nausea, rectal pain and vomiting.   Endocrine: Negative for cold intolerance and heat intolerance.   Genitourinary:  Negative for difficulty urinating and frequency.   Musculoskeletal:  Negative for back pain and myalgias.   Integumentary:  Negative for pallor and rash.   Neurological:  Negative for dizziness and headaches.   Hematological:  Negative for adenopathy. Does not bruise/bleed easily.   Psychiatric/Behavioral:  Negative for behavioral problems. The patient is not nervous/anxious.          Objective:      Physical Exam  Constitutional:       Appearance: Normal appearance. She is well-developed.   HENT:      Head: Normocephalic and atraumatic.   Eyes:      General: Lids are normal.      Conjunctiva/sclera: Conjunctivae normal.   Neck:      Trachea: Trachea normal.   Cardiovascular:      Rate and Rhythm: Normal rate and regular rhythm.      Heart sounds: Normal heart sounds.   Pulmonary:      Effort: Pulmonary effort is normal.      Breath sounds: Normal breath sounds.   Abdominal:      General: Bowel sounds are normal. There is no distension.       Palpations: Abdomen is soft.      Tenderness: There is no abdominal tenderness.      Hernia: No hernia is present.          Comments: Immediately inferior to the left lower quadrant colostomy there is a 1 mm tear in the skin, it is not appear to be infected, it appears to be granulating in well   Musculoskeletal:      Cervical back: Normal range of motion.   Skin:     General: Skin is warm and dry.   Neurological:      Mental Status: She is alert and oriented to person, place, and time.   Psychiatric:         Speech: Speech normal.         Behavior: Behavior normal. Behavior is cooperative.         Assessment:       Problem List Items Addressed This Visit          Oncology    Rectal cancer - Primary       Plan:       Patient has very small scratch under her colostomy site, there is no need for any intervention, instructed patient to keep it clean and it will heal of

## 2024-11-19 ENCOUNTER — TELEPHONE (OUTPATIENT)
Dept: HEMATOLOGY/ONCOLOGY | Facility: CLINIC | Age: 64
End: 2024-11-19
Payer: COMMERCIAL

## 2024-11-19 LAB
ABS NRBC COUNT: 0 THOU/UL (ref 0–0.01)
ABSOLUTE BASOPHIL: 0.1 10*3/UL (ref 0–0.3)
ABSOLUTE EOSINOPHIL: 0.3 10*3/UL (ref 0–0.6)
ABSOLUTE IMMATURE GRAN: 0.03 THOU/UL (ref 0–0.03)
ABSOLUTE LYMPHOCYTE: 1.4 10*3/UL (ref 1.2–4)
ABSOLUTE MONOCYTE: 0.7 10*3/UL (ref 0.1–0.8)
ALBUMIN SERPL BCP-MCNC: 3.7 G/DL (ref 3.4–5)
ALBUMIN/GLOBULIN RATIO: 1 RATIO (ref 1.1–1.8)
ALP SERPL-CCNC: 87 U/L (ref 45–117)
ALT SERPL W P-5'-P-CCNC: 23 U/L (ref 13–56)
ANION GAP SERPL CALC-SCNC: 6 MMOL/L (ref 3–11)
AST SERPL-CCNC: 20 U/L (ref 15–37)
BASOPHILS NFR BLD: 1.1 % (ref 0–3)
BILIRUB SERPL-MCNC: 0.3 MG/DL (ref 0.2–1)
BUN SERPL-MCNC: 12 MG/DL (ref 7–18)
BUN/CREAT SERPL: 24 RATIO
CALCIUM SERPL-MCNC: 8.8 MG/DL (ref 8.5–10.1)
CHLORIDE SERPL-SCNC: 107 MMOL/L (ref 98–107)
CO2 SERPL-SCNC: 27 MMOL/L (ref 21–32)
CREAT SERPL-MCNC: 0.5 MG/DL (ref 0.55–1.02)
EOSINOPHIL NFR BLD: 4.6 % (ref 0–6)
ERYTHROCYTE [DISTWIDTH] IN BLOOD BY AUTOMATED COUNT: 13.2 % (ref 0–15.5)
GFR ESTIMATION: 105
GLOBULIN: 3.6 G/DL (ref 2.3–3.5)
GLUCOSE SERPL-MCNC: 94 MG/DL (ref 74–106)
HCT VFR BLD AUTO: 47.8 % (ref 37–47)
HGB BLD-MCNC: 15.2 G/DL (ref 12–16)
IMMATURE GRANULOCYTES: 0.6 % (ref 0–0.43)
LYMPHOCYTES NFR BLD: 26.7 % (ref 20–45)
MCH RBC QN AUTO: 29.6 PG (ref 27–32)
MCHC RBC AUTO-ENTMCNC: 31.8 % (ref 32–36)
MCV RBC AUTO: 93.2 FL (ref 80–99)
MONOCYTES NFR BLD: 13 % (ref 2–10)
NEUTROPHILS # BLD AUTO: 2.9 10*3/UL (ref 1.4–7)
NEUTROPHILS NFR BLD: 54 % (ref 50–80)
NUCLEATED RED BLOOD CELLS: 0 % (ref 0–0.2)
PLATELETS: 277 10*3/UL (ref 130–400)
PMV BLD AUTO: 10.8 FL (ref 9.2–12.2)
POTASSIUM SERPL-SCNC: 4.6 MMOL/L (ref 3.5–5.1)
PROT SERPL-MCNC: 7.3 G/DL (ref 6.4–8.2)
RBC # BLD AUTO: 5.13 10*6/UL (ref 4.2–5.4)
SODIUM BLD-SCNC: 140 MMOL/L (ref 131–143)
T4 FREE SP9 P CHAL SERPL-SCNC: 0.95 NG/DL (ref 0.76–1.46)
TSH SERPL DL<=0.005 MIU/L-ACNC: 2.34 UIU/ML (ref 0.36–3.74)
WBC # BLD: 5.4 10*3/UL (ref 4.5–10)

## 2024-11-20 LAB
CEA SERPL-MCNC: 2.4 NG/ML
T3FREE SP1 P CHAL SERPL-SCNC: 3.2 PG/ML (ref 2.5–4.3)

## 2024-12-02 ENCOUNTER — OFFICE VISIT (OUTPATIENT)
Dept: HEMATOLOGY/ONCOLOGY | Facility: CLINIC | Age: 64
End: 2024-12-02
Payer: COMMERCIAL

## 2024-12-02 VITALS
BODY MASS INDEX: 34.06 KG/M2 | TEMPERATURE: 98 F | WEIGHT: 180.38 LBS | RESPIRATION RATE: 16 BRPM | OXYGEN SATURATION: 97 % | HEIGHT: 61 IN | HEART RATE: 84 BPM | DIASTOLIC BLOOD PRESSURE: 83 MMHG | SYSTOLIC BLOOD PRESSURE: 129 MMHG

## 2024-12-02 DIAGNOSIS — Z85.51 HISTORY OF BLADDER CANCER: ICD-10-CM

## 2024-12-02 DIAGNOSIS — M84.48XA SACRAL INSUFFICIENCY FRACTURE, INITIAL ENCOUNTER: ICD-10-CM

## 2024-12-02 DIAGNOSIS — Z93.3 COLOSTOMY IN PLACE: ICD-10-CM

## 2024-12-02 DIAGNOSIS — C20 RECTAL CANCER: Primary | ICD-10-CM

## 2024-12-02 PROCEDURE — 99215 OFFICE O/P EST HI 40 MIN: CPT | Mod: S$PBB,,, | Performed by: NURSE PRACTITIONER

## 2024-12-02 NOTE — PROGRESS NOTES
MEDICAL ONCOLOGY FOLLOW UP CONSULTATION NOTE    Patient ID: Yulissa Munguia is a 64 y.o. female.    Chief Complaint: Rectal cancer    HPI:  Patient is a 61-year-old female who was previously diagnosed with rectal cancer about a year back and was seen and evaluated by [facility] with recommendations for chemo XRT for for presumed rectal cancer stage III A. Patient however was reluctant to get any treatment and did not pursue any medical care since then.  She now was referred by her primary care provider after patient was dismissed from [facility].  She presents to our clinic today with her daughters for further evaluation.  She still does not seem to be interested in pursuing chemo or radiation options but would like to have a discussion about her prognosis.  She also reports having a rectal tube placed recently in [place] but I have no records to suggest that it was done recently and for what indication.  She continues to smoke every day and does not seem to actually believe in her cancer diagnosis.      Social History     Socioeconomic History    Marital status:    Tobacco Use    Smoking status: Every Day     Types: Cigarettes    Smokeless tobacco: Never    Tobacco comments:     would be interested in smoking cessation in the future   Substance and Sexual Activity    Alcohol use: Yes    Drug use: Not Currently     Types: Marijuana     Comment: thc oil     Social Drivers of Health     Financial Resource Strain: Low Risk  (3/30/2022)    [facility] from [facility], [facility]    Overall Financial Resource Strain (CARDIA)     Difficulty of Paying Living Expenses: Not hard at all   Food Insecurity: No Food Insecurity (3/30/2022)    Received from [facility] and [facility],  [facility]    Hunger Vital Sign     Worried About Running Out of Food in the Last Year: Never true     Ran Out of Food in the Last Year: Never true   Transportation Needs: No Transportation Needs (3/30/2022)    [facility], [facility] and Its [facility] and [facility]    PRAPARE - Transportation     Lack of Transportation (Medical): No     Lack of Transportation (Non-Medical): No   Housing Stability: Unknown (3/30/2022)    Received from [facility] and Its [facility] and [facility]ates, [facility] and [facility]ates    Housing Stability Vital Sign     Unable to Pay for Housing in the Last Year: No     Number of Places Lived in the Last Year: 0     Past Surgical History:   Procedure Laterality Date    ADENOIDECTOMY       SECTION      CORONARY ANGIOPLASTY WITH STENT PLACEMENT      PORTACATH PLACEMENT      ROBOT-ASSISTED CREATION OF COLOSTOMY USING DA MARTINA XI Left     TONSILLECTOMY      TUBAL LIGATION           Review of systems:  Review of Systems   Constitutional:  Positive for activity change and appetite change. Negative for chills, diaphoresis, fatigue and unexpected weight change.   HENT:  Negative for congestion, facial swelling, hearing loss, mouth sores, trouble swallowing and voice change.    Eyes:  Negative for photophobia, pain, discharge and itching.   Respiratory:  Negative for apnea, cough, choking, chest tightness and shortness of breath.    Cardiovascular:  Negative for chest pain, palpitations and leg swelling.   Gastrointestinal:  Positive for rectal pain. Negative for abdominal distention, abdominal pain, anal bleeding and blood in stool.   Endocrine: Negative for cold intolerance, heat intolerance,  polydipsia and polyphagia.   Genitourinary:  Negative for difficulty urinating, dysuria, flank pain and hematuria.   Musculoskeletal:  Negative for arthralgias, back pain, joint swelling, myalgias, neck pain and neck stiffness.   Skin:  Negative for color change, pallor and wound.   Allergic/Immunologic: Negative for environmental allergies, food allergies and immunocompromised state.   Neurological:  Negative for dizziness, seizures, facial asymmetry, speech difficulty, light-headedness, numbness and headaches.   Hematological:  Negative for adenopathy. Does not bruise/bleed easily.   Psychiatric/Behavioral:  Negative for agitation, behavioral problems, confusion, decreased concentration and sleep disturbance.              Physical Exam  Vitals and nursing note reviewed.   Constitutional:       General: She is not in acute distress.     Appearance: Normal appearance. She is not ill-appearing.   HENT:      Head: Normocephalic and atraumatic.      Nose: No congestion or rhinorrhea.   Eyes:      General: No scleral icterus.     Extraocular Movements: Extraocular movements intact.      Pupils: Pupils are equal, round, and reactive to light.   Cardiovascular:      Rate and Rhythm: Normal rate and regular rhythm.      Pulses: Normal pulses.      Heart sounds: Normal heart sounds. No murmur heard.     No gallop.   Pulmonary:      Effort: Pulmonary effort is normal. No respiratory distress.      Breath sounds: Normal breath sounds. No stridor. No wheezing or rhonchi.   Abdominal:      General: Bowel sounds are normal. There is no distension.      Palpations: There is no mass.      Tenderness: There is no abdominal tenderness. There is no guarding.   Musculoskeletal:         General: No swelling, tenderness, deformity or signs of injury. Normal range of motion.      Cervical back: Normal range of motion and neck supple. No rigidity. No muscular tenderness.      Right lower leg: No edema.      Left lower leg: No edema.    Skin:     General: Skin is warm.      Coloration: Skin is not jaundiced or pale.      Findings: No bruising or lesion.   Neurological:      General: No focal deficit present.      Mental Status: She is alert and oriented to person, place, and time.      Cranial Nerves: No cranial nerve deficit.      Sensory: No sensory deficit.      Motor: No weakness.      Gait: Gait normal.   Psychiatric:         Mood and Affect: Mood normal.         Behavior: Behavior normal.         Thought Content: Thought content normal.       Vitals:    12/02/24 0902   BP: 129/83   Pulse: 84   Resp: 16   Temp: 98.3 °F (36.8 °C)       Body surface area is 1.88 meters squared.    IMAGING:             Assessment/Plan:      ECOG 1     1. Rectal Cancer:    == Initially diagnosed in July 2021 as stage IIIA rectal cancer.  She has not followed and not pursued any treatment since then as she is skeptical about chemotherapy and side effects of radiation.  She believes an alternate treatment options which she has likely been doing since that time.  After recent dismissal from [facility] she presents to us for further discussion of treatment and management options.  == 7/14/22:  Patient underwent PET scan 07/08/2022 which showed continued thickening of the rectal wall extending into the anal region with increased radiotracer uptake.  This continues to extend over about 6 cm in length.  Increased radiotracer uptake remains present in the area with an SUV of approximately 22.5 compared 24.4 on the previous examination.  Indistinctness of the fat planes posteriorly remain present consistent with extra colonic extension.  There continues to be hypermetabolic lymph nodes in the inguinal region bilaterally short axis diameter is on the order of about 12 mm are present with SUVs on the order of approximately 3.4.  A mildly hypermetabolic lymph node remains present along the left sidewall of the pelvis having short axis diameter of approximately  7 mm today versus 10 mm on the previous examination.  The SUV in this area is approximately 2.1 versus 5.8 on previous exam. Considering her original diagnosis as Stage IIIA with outside oncologist, this PET scan shows it is likely, T4a with extracolonic extension and N1b with 2-3 regional LN involvement. I discussed with her about chemo-XRT  and would like to talk to radiation oncology first before she decides to pursue any treatment. I will hence make referral to Rad-onc in this regard  ==07/21/2022: Patient reports that she would like to pursue chemoradiaton. She has appointment today for radiation evaluation with Dr. Contreras. I discussed with Dr. Herndon and if she is candidate for radiation plan is for concurrent XRT with 5-fu. She will need mediport placement. Referral placed.   ==07/28/2022:  Pt here today to discuss upcoming chemotherapy, side effect profile, risk versus benefits, and expected outcomes have been discussed today. All questions and concerns answered and consents have been signed.After discussion with XRT and Dr. Herndon plan is to proceed with concurrent XRT and Xeloda. Xeloda dosage calculated with only 500mg tablets due to patient getting very confused with medications and having difficulty repeating proper dosage back after multiple attempts at teaching. She is poor candidate receive combination of 500mg and 150mg and will likely confuse them and take improper dose. She also reports taking too many to name or remember herbs and supplements which are not on medication list. I instructed her to contact clinic with list to ensure no interactions.   == 8/2022 - 9/13/22: completed XRT/xeloda. Based on NCCN Guidelines we will proceed with Capox for 12-16 weeks.  == 11/8/22 - 3/16/2023: completed 6 cycles of xelox with diverting colostomy on 11/22/22 with Dr Torre  == 3/16/23:  Here today to review recent CT chest abdomen pelvis showing no evidence of rectal cancer noted.  A questionable 1.6 cm right  renal pelvis masses noted recommending MRI abdomen with and without orders have been placed today.  Will refer patient back to Dr. Lomeli for evaluation in [facility]  == 3/23/23:  Patient walked into clinic today to discuss recent visit with Dr. Lomeli, currently she does not desire colorectal surgery after completion of neoadjuvant chemotherapy.  Today we discussed the importance of surgical resection as completion of therapy related to her diagnoses.  She is most concerned about having her rectal tube removed as it is causing her discomfort.  Explained that we concur with Dr. Lomeli recommended surgical resection prior to rectal tube removal.  She is willing to obtain a 2nd opinion with additional colorectal surgeon to discuss surgical options after her MRI is resulted which is scheduled for Tuesday March 26, 2023 but states she is still hesitant to undergo surgery.  == 4/6/23: here today to review MRI abdomen/pelvis showing 3.7 cm rectal mass abutting the posterior vagina near anal verge  with no lymph nodes noted, long discussion with patient and daughter today regarding recommendation of surgical intervention or know rectal mass to complete curative intent care. She is unsure if she will pursue surgery but would like to have her rectal tube removed as it is causing her discomfort. Referral to Dr Barksdale for second opinion pending. Incidental Right Renal Pelvis mass 1.2 cm, she has an appointment with Urology in May 2023.    5/4/23:  Here today after recent visit with 2nd colorectal surgeon who recommends surgical resection, patient states she has discussed with family and has decided she will not undergo any type of surgical resection.  She is aware of the risk of localized recurrence  2/26/24: doing well, missed several appointments due to loss of insurance, review of MRI abd/pelvis show small residual foci in lowe 1/3 of rectum, pt reports still has rectal tube but will order PET/CT to r/o  recurrence. Denies any difficulties with stoma/colostomy. MRI abd shows stable right renal cyst  12/2/24: last seen 2/2024, here today to re-establish care. Recently seen by Dr Contreras with MRI showing extensive fibrosis and small amount of residual tumor in low rectum    2. H/O Bladder Cancer    12/2/24: Back in June pt had severe dysuria and hematuria and had known h/o adenoCA in the bladder, questionable rt renal pelvis, so she was scheduled for cystoureteroscopy 6/21/24. Pt then cancelled and never rescheduled. She declines any f/u with urology. She then repeatedly canceled/delayed her MR Pelvis (scheduled for 6/24/24) and eventually had it done on 11/13/24. This showed tiny residual tumor in low rectum and nothing in bladder but there is bilateral sacral insufficiency fx.     During the last 5 months pt's urinary symptoms have almost completely resolved on their own with very rare small blood in urine and no pain. However she has intermittent low back and lt flank pain 2/2 sacral insufficiency fx.       Plan:  Completed bautista-adj chemotherapy, declines curative surgery after 2 separate evaluations with colorectal surgeons  Bone density testing ordered  CT c/a/p  Start OTC Calcium and Vit D   Port has not been flushed in >6 months will refer for port removal       Total time spent in counseling and discussion about further management options including relevant lab work, treatment,  prognosis, medications and intended side effects was more than 60 minutes. More than 50% of the time was spent on counseling and coordination of care.  This includes face to face time and non-face to face time preparing to see the patient (eg, review of tests), Obtaining and/or reviewing separately obtained history, Documenting clinical information in the electronic or other health record, Independently interpreting resultsand communicating results to the patient/family/caregiver, or Care coordination.

## 2024-12-04 ENCOUNTER — OFFICE VISIT (OUTPATIENT)
Dept: SURGERY | Facility: CLINIC | Age: 64
End: 2024-12-04
Payer: COMMERCIAL

## 2024-12-04 DIAGNOSIS — C20 RECTAL CANCER: Primary | ICD-10-CM

## 2024-12-04 PROCEDURE — 99213 OFFICE O/P EST LOW 20 MIN: CPT | Mod: S$PBB,25,, | Performed by: SURGERY

## 2024-12-04 NOTE — PROGRESS NOTES
History & Physical    SUBJECTIVE:     History of Present Illness:    64-year-old female is referred for left subclavian venous access port removal.  Personal history of rectal cancer in the port is no longer needed.  Patient desires removal    Chief Complaint   Patient presents with    Other     Port removal         Review of patient's allergies indicates:  Review of patient's allergies indicates:  No Known Allergies    Current Outpatient Medications on File Prior to Visit   Medication Sig Dispense Refill    aspirin 81 MG Chew Take 81 mg by mouth.      cyclobenzaprine (FLEXERIL) 10 MG tablet Take 10 mg by mouth 3 (three) times daily.      diclofenac (VOLTAREN) 75 MG EC tablet Take 75 mg by mouth 2 (two) times daily as needed.      HYDROcodone-acetaminophen (NORCO)  mg per tablet Take 1 tablet by mouth every 8 (eight) hours as needed for Pain. (Patient not taking: Reported on 2024) 90 tablet 0    HYDROcodone-acetaminophen (NORCO) 5-325 mg per tablet Take 1 tablet by mouth every 4 to 6 hours as needed. (Patient not taking: Reported on 2024)      turmeric 400 mg Cap Take by mouth. (Patient not taking: Reported on 2024)       No current facility-administered medications on file prior to visit.       Past Medical History:   Diagnosis Date    Cancer of anal canal     Encounter for blood transfusion     Hypertension      Past Surgical History:   Procedure Laterality Date    ADENOIDECTOMY       SECTION      CORONARY ANGIOPLASTY WITH STENT PLACEMENT      PORTACATH PLACEMENT      ROBOT-ASSISTED CREATION OF COLOSTOMY USING DA MARTINA XI Left     TONSILLECTOMY      TUBAL LIGATION       Family History   Problem Relation Name Age of Onset    Esophageal cancer Mother      Hypertension Mother      Colon cancer Father         Social History     Socioeconomic History    Marital status:    Tobacco Use    Smoking status: Every Day     Types: Cigarettes    Smokeless tobacco: Never    Tobacco comments:      would be interested in smoking cessation in the future   Substance and Sexual Activity    Alcohol use: Yes    Drug use: Not Currently     Types: Marijuana     Comment: thc oil     Social Drivers of Health     Financial Resource Strain: Low Risk  (3/30/2022)    [facility], [facility]    Overall Financial Resource Strain (CARDIA)     Difficulty of Paying Living Expenses: Not hard at all   Food Insecurity: No Food Insecurity (3/30/2022)    [facility], [facility]    Hunger Vital Sign     Worried About Running Out of Food in the Last Year: Never true     Ran Out of Food in the Last Year: Never true   Transportation Needs: No Transportation Needs (3/30/2022)    [facility], [facility]    PRAPARE - Transportation     Lack of Transportation (Medical): No     Lack of Transportation (Non-Medical): No   Housing Stability: Unknown (3/30/2022)    [facility], [facility]    Housing Stability Vital Sign     Unable to Pay for Housing in the Last Year: No     Number of Places Lived in the Last Year: 0          Review of Systems   Constitutional: Negative.    Respiratory: Negative.     Cardiovascular: Negative.    Genitourinary: Negative.    Musculoskeletal: Negative.    Neurological: Negative.    Endo/Heme/Allergies: Negative.    Psychiatric/Behavioral: Negative.         OBJECTIVE:      There were no vitals filed for this visit.              Physical Exam:  Physical Exam  Constitutional:       Appearance: Normal appearance.   HENT:      Head: Normocephalic and atraumatic.   Cardiovascular:      Rate and Rhythm: Normal rate and regular rhythm.      Pulses: Normal pulses.   Pulmonary:      Effort: Pulmonary effort is normal.      Breath sounds: Normal breath sounds.   Chest:          Comments: Left upper chest wall venous access port palpated in subcutaneous pocket  Abdominal:      General: Abdomen is flat. Bowel sounds are normal.      Palpations: Abdomen is soft.   Musculoskeletal:         General: Normal range of motion.      Cervical back: Normal range of motion.   Skin:     General: Skin is dry.   Neurological:      General: No focal deficit present.      Mental Status: She is alert and oriented to person, place, and time.   Psychiatric:         Mood and Affect: Mood normal.         Behavior: Behavior normal.             ASSESSMENT/PLAN:   Personal history of rectal cancer, port no longer needed  PLAN:  Removal of venous access port tomorrow outpatient day surgery

## 2024-12-05 ENCOUNTER — OUTSIDE PLACE OF SERVICE (OUTPATIENT)
Dept: SURGERY | Facility: CLINIC | Age: 64
End: 2024-12-05

## 2024-12-06 ENCOUNTER — NURSE TRIAGE (OUTPATIENT)
Dept: ADMINISTRATIVE | Facility: CLINIC | Age: 64
End: 2024-12-06
Payer: COMMERCIAL

## 2024-12-06 NOTE — TELEPHONE ENCOUNTER
"Pt stated she had a port removed on yesterday. Dizziness yesterday and this am. Has not been drinking as much. Pt stated she hasn't had a full bottle of water since the surgery. Pt instructed on home care per care advice. Pt was encouraged to drink fluids and rest and to call back in 2 hours if she still feels dizzy. Pt verbalized understanding to all instructions.  Reason for Disposition   Dizziness caused by not drinking enough fluids    Additional Information   Negative: SEVERE difficulty breathing (e.g., struggling for each breath, speaks in single words)   Negative: [1] Difficulty breathing or swallowing AND [2] started suddenly after medicine, an allergic food or bee sting   Negative: Shock suspected (e.g., cold/pale/clammy skin, too weak to stand, low BP, rapid pulse)   Negative: Difficult to awaken or acting confused (e.g., disoriented, slurred speech)   Negative: [1] Weakness (i.e., paralysis, loss of muscle strength) of the face, arm or leg on one side of the body AND [2] sudden onset AND [3] present now   Negative: [1] Numbness (i.e., loss of sensation) of the face, arm or leg on one side of the body AND [2] sudden onset AND [3] present now   Negative: [1] Loss of speech or garbled speech AND [2] sudden onset AND [3] present now   Negative: Overdose (accidental or intentional) of medications   Negative: [1] Fainted > 15 minutes ago AND [2] still feels too weak or dizzy to stand   Negative: Heart beating < 50 beats per minute OR > 140 beats per minute   Negative: Sounds like a life-threatening emergency to the triager   Negative: Difficulty breathing   Negative: SEVERE dizziness (e.g., unable to stand, requires support to walk, feels like passing out now)   Negative: Extra heartbeats, irregular heart beating, or heart is beating very fast  (i.e., "palpitations")   Negative: [1] Drinking very little AND [2] dehydration suspected (e.g., no urine > 12 hours, very dry mouth, very lightheaded)   Negative: [1] " Weakness (i.e., paralysis, loss of muscle strength) of the face, arm / hand, or leg / foot on one side of the body AND [2] sudden onset AND [3] brief (now gone)   Negative: [1] Numbness (i.e., loss of sensation) of the face, arm / hand, or leg / foot on one side of the body AND [2] sudden onset AND [3] brief (now gone)   Negative: [1] Loss of speech or garbled speech AND [2] sudden onset AND [3] brief (now gone)   Negative: Loss of vision or double vision  (Exception: Similar to previous migraines.)   Negative: Patient sounds very sick or weak to the triager   Negative: [1] Dizziness caused by heat exposure, sudden standing, or poor fluid intake AND [2] no improvement after 2 hours of rest and fluids   Negative: [1] Fever > 103 F (39.4 C) AND [2] not able to get the fever down using Fever Care Advice   Negative: [1] Fever > 101 F (38.3 C) AND [2] age > 60 years   Negative: [1] Fever > 100 F (37.8 C) AND [2] bedridden (e.g., CVA, chronic illness, recovering from surgery)   Negative: [1] Fever > 100 F (37.8 C) AND [2] diabetes mellitus or weak immune system (e.g., HIV positive, cancer chemo, splenectomy, organ transplant, chronic steroids)   Negative: [1] MODERATE dizziness (e.g., interferes with normal activities) AND [2] has NOT been evaluated by doctor (or NP/PA) for this  (Exception: Dizziness caused by heat exposure, sudden standing, or poor fluid intake.)   Negative: Fever present > 3 days (72 hours)   Negative: Taking a medicine that could cause dizziness (e.g., blood pressure medications, diuretics)   Negative: [1] MODERATE dizziness (e.g., interferes with normal activities) AND [2] has been evaluated by doctor (or NP/PA) for this   Negative: [1] MILD dizziness (e.g., walking normally) AND [2] has NOT been evaluated by doctor (or NP/PA) for this  (Exception: Dizziness caused by heat exposure, sudden standing, or poor fluid intake.)   Negative: Substance use (drug use) or unhealthy alcohol use, known or  suspected   Negative: [1] MILD dizziness (e.g., walking normally) AND [2] has been evaluated by doctor (or NP/PA) for this   Negative: Dizziness is a chronic symptom (recurrent or ongoing AND present > 4 weeks)    Protocols used: Dizziness - [id number]

## 2024-12-10 ENCOUNTER — TELEPHONE (OUTPATIENT)
Dept: SURGERY | Facility: CLINIC | Age: 64
End: 2024-12-10
Payer: COMMERCIAL

## 2024-12-10 NOTE — TELEPHONE ENCOUNTER
Left message with patient letting her know she is okay to drive to her post op appt.    ----- Message from Candy sent at 12/10/2024 10:46 AM CST -----  Regarding: calling about driving her self to appt tomorrow  Contact: pt  Pt calling wanting to know when she can drive and she has appt tomorrow and she can be reached at [phone]     Thanks,

## 2024-12-11 ENCOUNTER — OFFICE VISIT (OUTPATIENT)
Dept: SURGERY | Facility: CLINIC | Age: 64
End: 2024-12-11
Payer: COMMERCIAL

## 2024-12-11 DIAGNOSIS — Z98.890 POST-OPERATIVE STATE: Primary | ICD-10-CM

## 2024-12-11 NOTE — PROGRESS NOTES
HPI:  Postop revisit status post removal of left venous access port    PHYSICAL EXAM:  Incision intact with Steri-Strips remaining on.  Subcuticular suture removed  ASSESSMENT:    Stable  PLAN:        Revisit as needed

## 2025-01-10 ENCOUNTER — TELEPHONE (OUTPATIENT)
Dept: HEMATOLOGY/ONCOLOGY | Facility: CLINIC | Age: 65
End: 2025-01-10
Payer: COMMERCIAL

## 2025-01-10 NOTE — TELEPHONE ENCOUNTER
Called patient to verify if she had her scans done. She stated no due to having insurance problems and being sick with a cough that is just now being relieved. Unsure of when she wanted to reschedule for. I cancelled appt with us and provided central scheduling's number to have her scans scheduled. She will give us a call when ready.

## 2025-01-31 ENCOUNTER — DOCUMENTATION ONLY (OUTPATIENT)
Dept: HEMATOLOGY/ONCOLOGY | Facility: CLINIC | Age: 65
End: 2025-01-31
Payer: COMMERCIAL

## 2025-01-31 NOTE — PROGRESS NOTES
Called pt to see if bone scan has been scheduled. She states she was never told about this scan or called to schedule. Tried to give pt the number to central scheduling but she did not want it. Told her to call back with appt date when ready

## 2025-02-06 ENCOUNTER — OFFICE VISIT (OUTPATIENT)
Dept: HEMATOLOGY/ONCOLOGY | Facility: CLINIC | Age: 65
End: 2025-02-06
Payer: COMMERCIAL

## 2025-02-06 VITALS
RESPIRATION RATE: 16 BRPM | SYSTOLIC BLOOD PRESSURE: 136 MMHG | DIASTOLIC BLOOD PRESSURE: 82 MMHG | BODY MASS INDEX: 33.7 KG/M2 | HEIGHT: 61 IN | OXYGEN SATURATION: 96 % | HEART RATE: 87 BPM | WEIGHT: 178.5 LBS

## 2025-02-06 DIAGNOSIS — C20 RECTAL CANCER: Primary | ICD-10-CM

## 2025-02-06 PROCEDURE — 99215 OFFICE O/P EST HI 40 MIN: CPT | Mod: S$PBB,,, | Performed by: INTERNAL MEDICINE

## 2025-02-06 NOTE — PROGRESS NOTES
MEDICAL ONCOLOGY FOLLOW UP CONSULTATION NOTE    Patient ID: Yulissa Munguia is a 64 y.o. female.    Chief Complaint: Rectal cancer    HPI:  Patient is a 61-year-old female who was previously diagnosed with rectal cancer about a year back and was seen and evaluated by [facility] with recommendations for chemo XRT for for presumed rectal cancer stage III A. Patient however was reluctant to get any treatment and did not pursue any medical care since then.  She now was referred by her primary care provider after patient was dismissed from [facility].  She presents to our clinic today with her daughters for further evaluation.  She still does not seem to be interested in pursuing chemo or radiation options but would like to have a discussion about her prognosis.  She also reports having a rectal tube placed recently in [place] but I have no records to suggest that it was done recently and for what indication.  She continues to smoke every day and does not seem to actually believe in her cancer diagnosis.      Social History     Socioeconomic History    Marital status:    Tobacco Use    Smoking status: Every Day     Types: Cigarettes    Smokeless tobacco: Never    Tobacco comments:     would be interested in smoking cessation in the future   Substance and Sexual Activity    Alcohol use: Yes    Drug use: Not Currently     Types: Marijuana     Comment: thc oil     Social Drivers of Health     Financial Resource Strain: Low Risk  (3/30/2022)    Received from [employer] [facility] and Its Subsidiaries and Affiliates, [employer] [facility] Its Subsidiaries and Affiliates    Overall Financial Resource Strain (CARDIA)     Difficulty of Paying Living Expenses: Not hard at all   Food Insecurity: No Food Insecurity (3/30/2022)    Received from [employer] [facility] and Its Subsidiaries and Affiliates,  [facility]    Hunger Vital Sign     Worried About Running Out of Food in the Last Year: Never true     Ran Out of Food in the Last Year: Never true   Transportation Needs: No Transportation Needs (3/30/2022)    [facility], [facility] and Its [facility] and [facility]ates    PRAPARE - Transportation     Lack of Transportation (Medical): No     Lack of Transportation (Non-Medical): No   Housing Stability: Unknown (3/30/2022)    Received from [facility] and Its [facility] and Affiliates, [facility] Its [facility] and Aff[facility]ates    Housing Stability Vital Sign     Unable to Pay for Housing in the Last Year: No     Number of Places Lived in the Last Year: 0     Past Surgical History:   Procedure Laterality Date    ADENOIDECTOMY       SECTION      CORONARY ANGIOPLASTY WITH STENT PLACEMENT      PORTACATH PLACEMENT      portacath removal      ROBOT-ASSISTED CREATION OF COLOSTOMY USING DA MARTINA XI Left     TONSILLECTOMY      TUBAL LIGATION           Review of systems:  Review of Systems   Constitutional:  Positive for activity change and appetite change. Negative for chills, diaphoresis, fatigue and unexpected weight change.   HENT:  Negative for congestion, facial swelling, hearing loss, mouth sores, trouble swallowing and voice change.    Eyes:  Negative for photophobia, pain, discharge and itching.   Respiratory:  Negative for apnea, cough, choking, chest tightness and shortness of breath.    Cardiovascular:  Negative for chest pain, palpitations and leg swelling.   Gastrointestinal:  Positive for rectal pain. Negative for abdominal distention, abdominal pain, anal bleeding and blood in stool.   Endocrine: Negative for cold  intolerance, heat intolerance, polydipsia and polyphagia.   Genitourinary:  Negative for difficulty urinating, dysuria, flank pain and hematuria.   Musculoskeletal:  Negative for arthralgias, back pain, joint swelling, myalgias, neck pain and neck stiffness.   Skin:  Negative for color change, pallor and wound.   Allergic/Immunologic: Negative for environmental allergies, food allergies and immunocompromised state.   Neurological:  Negative for dizziness, seizures, facial asymmetry, speech difficulty, light-headedness, numbness and headaches.   Hematological:  Negative for adenopathy. Does not bruise/bleed easily.   Psychiatric/Behavioral:  Negative for agitation, behavioral problems, confusion, decreased concentration and sleep disturbance.              Physical Exam  Vitals and nursing note reviewed.   Constitutional:       General: She is not in acute distress.     Appearance: Normal appearance. She is not ill-appearing.   HENT:      Head: Normocephalic and atraumatic.      Nose: No congestion or rhinorrhea.   Eyes:      General: No scleral icterus.     Extraocular Movements: Extraocular movements intact.      Pupils: Pupils are equal, round, and reactive to light.   Cardiovascular:      Rate and Rhythm: Normal rate and regular rhythm.      Pulses: Normal pulses.      Heart sounds: Normal heart sounds. No murmur heard.     No gallop.   Pulmonary:      Effort: Pulmonary effort is normal. No respiratory distress.      Breath sounds: Normal breath sounds. No stridor. No wheezing or rhonchi.   Abdominal:      General: Bowel sounds are normal. There is no distension.      Palpations: There is no mass.      Tenderness: There is no abdominal tenderness. There is no guarding.   Musculoskeletal:         General: No swelling, tenderness, deformity or signs of injury. Normal range of motion.      Cervical back: Normal range of motion and neck supple. No rigidity. No muscular tenderness.      Right lower leg: No edema.       Left lower leg: No edema.   Skin:     General: Skin is warm.      Coloration: Skin is not jaundiced or pale.      Findings: No bruising or lesion.   Neurological:      General: No focal deficit present.      Mental Status: She is alert and oriented to person, place, and time.      Cranial Nerves: No cranial nerve deficit.      Sensory: No sensory deficit.      Motor: No weakness.      Gait: Gait normal.   Psychiatric:         Mood and Affect: Mood normal.         Behavior: Behavior normal.         Thought Content: Thought content normal.       Vitals:    02/06/25 0852   BP: 136/82   Pulse: 87   Resp: 16       Body surface area is 1.87 meters squared.    IMAGING:             Assessment/Plan:      ECOG 1     1. Rectal Cancer:    == Initially diagnosed in July 2021 as stage IIIA rectal cancer.  She has not followed and not pursued any treatment since then as she is skeptical about chemotherapy and side effects of radiation.  She believes an alternate treatment options which she has likely been doing since that time.  After recent dismissal from [facility] she presents to us for further discussion of treatment and management options.  == 7/14/22:  Patient underwent PET scan 07/08/2022 which showed continued thickening of the rectal wall extending into the anal region with increased radiotracer uptake.  This continues to extend over about 6 cm in length.  Increased radiotracer uptake remains present in the area with an SUV of approximately 22.5 compared 24.4 on the previous examination.  Indistinctness of the fat planes posteriorly remain present consistent with extra colonic extension.  There continues to be hypermetabolic lymph nodes in the inguinal region bilaterally short axis diameter is on the order of about 12 mm are present with SUVs on the order of approximately 3.4.  A mildly hypermetabolic lymph node remains present along the left sidewall of the pelvis having short axis diameter of approximately  7 mm today versus 10 mm on the previous examination.  The SUV in this area is approximately 2.1 versus 5.8 on previous exam. Considering her original diagnosis as Stage IIIA with outside oncologist, this PET scan shows it is likely, T4a with extracolonic extension and N1b with 2-3 regional LN involvement. I discussed with her about chemo-XRT  and would like to talk to radiation oncology first before she decides to pursue any treatment. I will hence make referral to Rad-onc in this regard  ==07/21/2022: Patient reports that she would like to pursue chemoradiaton. She has appointment today for radiation evaluation with Dr. Contreras. I discussed with Dr. Herndon and if she is candidate for radiation plan is for concurrent XRT with 5-fu. She will need mediport placement. Referral placed.   ==07/28/2022:  Pt here today to discuss upcoming chemotherapy, side effect profile, risk versus benefits, and expected outcomes have been discussed today. All questions and concerns answered and consents have been signed.After discussion with XRT and Dr. Herndon plan is to proceed with concurrent XRT and Xeloda. Xeloda dosage calculated with only 500mg tablets due to patient getting very confused with medications and having difficulty repeating proper dosage back after multiple attempts at teaching. She is poor candidate receive combination of 500mg and 150mg and will likely confuse them and take improper dose. She also reports taking too many to name or remember herbs and supplements which are not on medication list. I instructed her to contact clinic with list to ensure no interactions.   == 8/2022 - 9/13/22: completed XRT/xeloda. Based on NCCN Guidelines we will proceed with Capox for 12-16 weeks.  == 11/8/22 - 3/16/2023: completed 6 cycles of xelox with diverting colostomy on 11/22/22 with Dr Torre  == 3/16/23:  Here today to review recent CT chest abdomen pelvis showing no evidence of rectal cancer noted.  A questionable 1.6 cm right  renal pelvis masses noted recommending MRI abdomen with and without orders have been placed today.  Will refer patient back to Dr. Lomeli for evaluation in [facility]  == 3/23/23:  Patient walked into clinic today to discuss recent visit with Dr. Lomeli, currently she does not desire colorectal surgery after completion of neoadjuvant chemotherapy.  Today we discussed the importance of surgical resection as completion of therapy related to her diagnoses.  She is most concerned about having her rectal tube removed as it is causing her discomfort.  Explained that we concur with Dr. Lomeli recommended surgical resection prior to rectal tube removal.  She is willing to obtain a 2nd opinion with additional colorectal surgeon to discuss surgical options after her MRI is resulted which is scheduled for Tuesday March 26, 2023 but states she is still hesitant to undergo surgery.  == 4/6/23: here today to review MRI abdomen/pelvis showing 3.7 cm rectal mass abutting the posterior vagina near anal verge  with no lymph nodes noted, long discussion with patient and daughter today regarding recommendation of surgical intervention or know rectal mass to complete curative intent care. She is unsure if she will pursue surgery but would like to have her rectal tube removed as it is causing her discomfort. Referral to Dr Barksdale for second opinion pending. Incidental Right Renal Pelvis mass 1.2 cm, she has an appointment with Urology in May 2023.    ==5/4/23:  Here today after recent visit with 2nd colorectal surgeon who recommends surgical resection, patient states she has discussed with family and has decided she will not undergo any type of surgical resection.  She is aware of the risk of localized recurrence  ==2/26/24: doing well, missed several appointments due to loss of insurance, review of MRI abd/pelvis show small residual foci in lowe 1/3 of rectum, pt reports still has rectal tube but will order PET/CT to r/o  recurrence. Denies any difficulties with stoma/colostomy. MRI abd shows stable right renal cyst  ==12/2/24: last seen 2/2024, here today to re-establish care. Recently seen by Dr Contreras with MRI showing extensive fibrosis and small amount of residual tumor in low rectum as well as bilateral sacral insufficiency fractures   == 2/6/25:  CT scan for restaging showed no evidence of recurrence.  Will continue with observation only at this time.     2. H/O Bladder Cancer    12/2/24: Back in June pt had severe dysuria and hematuria and had known h/o adenoCA in the bladder, questionable rt renal pelvis, so she was scheduled for cystoureteroscopy 6/21/24. Pt then cancelled and never rescheduled. She declines any f/u with urology. She then repeatedly canceled/delayed her MR Pelvis (scheduled for 6/24/24) and eventually had it done on 11/13/24. This showed tiny residual tumor in low rectum and nothing in bladder but there is bilateral sacral insufficiency fx.  urinary symptoms have almost completely resolved on their own with very rare small blood in urine and no pain. However she has intermittent low back and lt flank pain 2/2 sacral insufficiency fx.     3. Osteoporosis:    == Advised to take Calcium and Vit D. Would recommend to start bis-phosphonates. Patient to talk to her primary care regarding this       Plan:  Completed bautista-adj chemotherapy, declines curative surgery after 2 separate evaluations with colorectal surgeons  Continue OTC Calcium and Vit D. Patient needs to talk to her PCP regarding osteoporosis noted on her imaging.   Port has not been flushed in >6 months will refer for port removal     RTC in 3 months for NP visit with labs: CBC, CMP, CEA for f/up      Total time spent in counseling and discussion about further management options including relevant lab work, treatment,  prognosis, medications and intended side effects was more than 60 minutes. More than 50% of the time was spent on counseling and  coordination of care.  This includes face to face time and non-face to face time preparing to see the patient (eg, review of tests), Obtaining and/or reviewing separately obtained history, Documenting clinical information in the electronic or other health record, Independently interpreting resultsand communicating results to the patient/family/caregiver, or Care coordination.

## 2025-05-13 ENCOUNTER — OFFICE VISIT (OUTPATIENT)
Dept: HEMATOLOGY/ONCOLOGY | Facility: CLINIC | Age: 65
End: 2025-05-13
Payer: COMMERCIAL

## 2025-05-13 VITALS
DIASTOLIC BLOOD PRESSURE: 69 MMHG | BODY MASS INDEX: 34.36 KG/M2 | OXYGEN SATURATION: 98 % | RESPIRATION RATE: 16 BRPM | HEIGHT: 61 IN | WEIGHT: 182 LBS | SYSTOLIC BLOOD PRESSURE: 109 MMHG | HEART RATE: 80 BPM

## 2025-05-13 DIAGNOSIS — Z93.3 COLOSTOMY IN PLACE: ICD-10-CM

## 2025-05-13 DIAGNOSIS — C20 RECTAL CANCER: Primary | ICD-10-CM

## 2025-05-13 DIAGNOSIS — Z85.51 HISTORY OF BLADDER CANCER: ICD-10-CM

## 2025-05-13 PROCEDURE — 99215 OFFICE O/P EST HI 40 MIN: CPT | Mod: S$PBB,,, | Performed by: NURSE PRACTITIONER

## 2025-05-13 NOTE — PROGRESS NOTES
MEDICAL ONCOLOGY FOLLOW UP CONSULTATION NOTE    Patient ID: Yulissa Munguia is a 64 y.o. female.    Chief Complaint: Rectal cancer    HPI:  Patient is a 61-year-old female who was previously diagnosed with rectal cancer about a year back and was seen and evaluated by [facility] with recommendations for chemo XRT for for presumed rectal cancer stage III A. Patient however was reluctant to get any treatment and did not pursue any medical care since then.  She now was referred by her primary care provider after patient was dismissed from [facility].  She presents to our clinic today with her daughters for further evaluation.  She still does not seem to be interested in pursuing chemo or radiation options but would like to have a discussion about her prognosis.  She also reports having a rectal tube placed recently in [place] but I have no records to suggest that it was done recently and for what indication.  She continues to smoke every day and does not seem to actually believe in her cancer diagnosis.      Social History     Socioeconomic History    Marital status:    Tobacco Use    Smoking status: Every Day     Types: Cigarettes    Smokeless tobacco: Never    Tobacco comments:     would be interested in smoking cessation in the future   Substance and Sexual Activity    Alcohol use: Yes    Drug use: Not Currently     Types: Marijuana     Comment: thc oil     Social Drivers of Health     Financial Resource Strain: Low Risk  (3/30/2022)    Received from [facility] and Its Subsidiaries and Affiliates    Overall Financial Resource Strain (CARDIA)     Difficulty of Paying Living Expenses: Not hard at all   Food Insecurity: No Food Insecurity (3/30/2022)    Received from [facility] and Its Subsidiaries and Affiliates    Hunger Vital Sign     Worried About Running Out of Food in the Last Year: Never true     Ran  Out of Food in the Last Year: Never true   Transportation Needs: No Transportation Needs (3/30/2022)    Received from [facility] and Its Subsidiaries and Affiliates    PRAPARE - Transportation     Lack of Transportation (Medical): No     Lack of Transportation (Non-Medical): No   Housing Stability: Unknown (3/30/2022)    [facility]    Housing Stability Vital Sign     Unable to Pay for Housing in the Last Year: No     Number of Places Lived in the Last Year: 0     Past Surgical History:   Procedure Laterality Date    ADENOIDECTOMY       SECTION      CORONARY ANGIOPLASTY WITH STENT PLACEMENT      PORTACATH PLACEMENT      portacath removal      ROBOT-ASSISTED CREATION OF COLOSTOMY USING DA MARTINA XI Left     TONSILLECTOMY      TUBAL LIGATION           Review of systems:  Review of Systems   Constitutional:  Positive for activity change and appetite change. Negative for chills, diaphoresis, fatigue and unexpected weight change.   HENT:  Negative for congestion, facial swelling, hearing loss, mouth sores, trouble swallowing and voice change.    Eyes:  Negative for photophobia, pain, discharge and itching.   Respiratory:  Negative for apnea, cough, choking, chest tightness and shortness of breath.    Cardiovascular:  Negative for chest pain, palpitations and leg swelling.   Gastrointestinal:  Positive for rectal pain. Negative for abdominal distention, abdominal pain, anal bleeding and blood in stool.   Endocrine: Negative for cold intolerance, heat intolerance, polydipsia and polyphagia.   Genitourinary:  Negative for difficulty urinating, dysuria, flank pain and hematuria.   Musculoskeletal:  Negative for arthralgias, back pain, joint swelling, myalgias, neck pain and neck stiffness.   Skin:  Negative for color change, pallor and wound.   Allergic/Immunologic: Negative for environmental  allergies, food allergies and immunocompromised state.   Neurological:  Negative for dizziness, seizures, facial asymmetry, speech difficulty, light-headedness, numbness and headaches.   Hematological:  Negative for adenopathy. Does not bruise/bleed easily.   Psychiatric/Behavioral:  Negative for agitation, behavioral problems, confusion, decreased concentration and sleep disturbance.              Physical Exam  Vitals and nursing note reviewed.   Constitutional:       General: She is not in acute distress.     Appearance: Normal appearance. She is not ill-appearing.   HENT:      Head: Normocephalic and atraumatic.      Nose: No congestion or rhinorrhea.   Eyes:      General: No scleral icterus.     Extraocular Movements: Extraocular movements intact.      Pupils: Pupils are equal, round, and reactive to light.   Cardiovascular:      Rate and Rhythm: Normal rate and regular rhythm.      Pulses: Normal pulses.      Heart sounds: Normal heart sounds. No murmur heard.     No gallop.   Pulmonary:      Effort: Pulmonary effort is normal. No respiratory distress.      Breath sounds: Normal breath sounds. No stridor. No wheezing or rhonchi.   Abdominal:      General: Bowel sounds are normal. There is no distension.      Palpations: There is no mass.      Tenderness: There is no abdominal tenderness. There is no guarding.   Musculoskeletal:         General: No swelling, tenderness, deformity or signs of injury. Normal range of motion.      Cervical back: Normal range of motion and neck supple. No rigidity. No muscular tenderness.      Right lower leg: No edema.      Left lower leg: No edema.   Skin:     General: Skin is warm.      Coloration: Skin is not jaundiced or pale.      Findings: No bruising or lesion.   Neurological:      General: No focal deficit present.      Mental Status: She is alert and oriented to person, place, and time.      Cranial Nerves: No cranial nerve deficit.      Sensory: No sensory deficit.       Motor: No weakness.      Gait: Gait normal.   Psychiatric:         Mood and Affect: Mood normal.         Behavior: Behavior normal.         Thought Content: Thought content normal.       Vitals:    05/13/25 0958   BP: 109/69   Pulse: 80   Resp: 16       Body surface area is 1.89 meters squared.    IMAGING:             Assessment/Plan:      ECOG 1     1. Rectal Cancer:    == Initially diagnosed in July 2021 as stage IIIA rectal cancer.  She has not followed and not pursued any treatment since then as she is skeptical about chemotherapy and side effects of radiation.  She believes an alternate treatment options which she has likely been doing since that time.  After recent dismissal from [facility] she presents to us for further discussion of treatment and management options.  == 7/14/22:  Patient underwent PET scan 07/08/2022 which showed continued thickening of the rectal wall extending into the anal region with increased radiotracer uptake.  This continues to extend over about 6 cm in length.  Increased radiotracer uptake remains present in the area with an SUV of approximately 22.5 compared 24.4 on the previous examination.  Indistinctness of the fat planes posteriorly remain present consistent with extra colonic extension.  There continues to be hypermetabolic lymph nodes in the inguinal region bilaterally short axis diameter is on the order of about 12 mm are present with SUVs on the order of approximately 3.4.  A mildly hypermetabolic lymph node remains present along the left sidewall of the pelvis having short axis diameter of approximately 7 mm today versus 10 mm on the previous examination.  The SUV in this area is approximately 2.1 versus 5.8 on previous exam. Considering her original diagnosis as Stage IIIA with outside oncologist, this PET scan shows it is likely, T4a with extracolonic extension and N1b with 2-3 regional LN involvement. I discussed with her about chemo-XRT  and would like to  talk to radiation oncology first before she decides to pursue any treatment. I will hence make referral to Rad-onc in this regard  ==07/21/2022: Patient reports that she would like to pursue chemoradiaton. She has appointment today for radiation evaluation with Dr. Contreras. I discussed with Dr. Herndon and if she is candidate for radiation plan is for concurrent XRT with 5-fu. She will need mediport placement. Referral placed.   ==07/28/2022:  Pt here today to discuss upcoming chemotherapy, side effect profile, risk versus benefits, and expected outcomes have been discussed today. All questions and concerns answered and consents have been signed.After discussion with XRT and Dr. Herndon plan is to proceed with concurrent XRT and Xeloda. Xeloda dosage calculated with only 500mg tablets due to patient getting very confused with medications and having difficulty repeating proper dosage back after multiple attempts at teaching. She is poor candidate receive combination of 500mg and 150mg and will likely confuse them and take improper dose. She also reports taking too many to name or remember herbs and supplements which are not on medication list. I instructed her to contact clinic with list to ensure no interactions.   == 8/2022 - 9/13/22: completed XRT/xeloda. Based on NCCN Guidelines we will proceed with Capox for 12-16 weeks.  == 11/8/22 - 3/16/2023: completed 6 cycles of xelox with diverting colostomy on 11/22/22 with Dr Torre  == 3/16/23:  Here today to review recent CT chest abdomen pelvis showing no evidence of rectal cancer noted.  A questionable 1.6 cm right renal pelvis masses noted recommending MRI abdomen with and without orders have been placed today.  Will refer patient back to Dr. Lomeli for evaluation in [facility]  == 3/23/23:  Patient walked into clinic today to discuss recent visit with Dr. Lomeli, currently she does not desire colorectal surgery after completion of neoadjuvant chemotherapy.  Today  we discussed the importance of surgical resection as completion of therapy related to her diagnoses.  She is most concerned about having her rectal tube removed as it is causing her discomfort.  Explained that we concur with Dr. Lomeli recommended surgical resection prior to rectal tube removal.  She is willing to obtain a 2nd opinion with additional colorectal surgeon to discuss surgical options after her MRI is resulted which is scheduled for Tuesday March 26, 2023 but states she is still hesitant to undergo surgery.  == 4/6/23: here today to review MRI abdomen/pelvis showing 3.7 cm rectal mass abutting the posterior vagina near anal verge  with no lymph nodes noted, long discussion with patient and daughter today regarding recommendation of surgical intervention or know rectal mass to complete curative intent care. She is unsure if she will pursue surgery but would like to have her rectal tube removed as it is causing her discomfort. Referral to Dr Barksdale for second opinion pending. Incidental Right Renal Pelvis mass 1.2 cm, she has an appointment with Urology in May 2023.    ==5/4/23:  Here today after recent visit with 2nd colorectal surgeon who recommends surgical resection, patient states she has discussed with family and has decided she will not undergo any type of surgical resection.  She is aware of the risk of localized recurrence  ==2/26/24: doing well, missed several appointments due to loss of insurance, review of MRI abd/pelvis show small residual foci in lowe 1/3 of rectum, pt reports still has rectal tube but will order PET/CT to r/o recurrence. Denies any difficulties with stoma/colostomy. MRI abd shows stable right renal cyst  ==12/2/24: last seen 2/2024, here today to re-establish care. Recently seen by Dr Contreras with MRI showing extensive fibrosis and small amount of residual tumor in low rectum as well as bilateral sacral insufficiency fractures   == 5/13/25:  CT scan for restaging showed no  evidence of recurrence.  Will continue with observation only at this time.     2. H/O Bladder Cancer    12/2/24: Back in June pt had severe dysuria and hematuria and had known h/o adenoCA in the bladder, questionable rt renal pelvis, so she was scheduled for cystoureteroscopy 6/21/24. Pt then cancelled and never rescheduled. She declines any f/u with urology. She then repeatedly canceled/delayed her MR Pelvis (scheduled for 6/24/24) and eventually had it done on 11/13/24. This showed tiny residual tumor in low rectum and nothing in bladder but there is bilateral sacral insufficiency fx.  urinary symptoms have almost completely resolved on their own with very rare small blood in urine and no pain. However she has intermittent low back and lt flank pain 2/2 sacral insufficiency fx.     3. Osteoporosis:    == Advised to take Calcium and Vit D. Would recommend to start bis-phosphonates. Patient to talk to her primary care regarding this       Plan:  Completed bautista-adj chemotherapy, declines curative surgery after 2 separate evaluations with colorectal surgeons  Continue OTC Calcium and Vit D. Patient needs to talk to her PCP regarding osteoporosis noted on her imaging.   Port has not been flushed in >6 months will refer for port removal     RTC in 6 months for NP visit with labs: CBC, CMP, CEA for f/up with CT c/a/p      Total time spent in counseling and discussion about further management options including relevant lab work, treatment,  prognosis, medications and intended side effects was more than 60 minutes. More than 50% of the time was spent on counseling and coordination of care.  This includes face to face time and non-face to face time preparing to see the patient (eg, review of tests), Obtaining and/or reviewing separately obtained history, Documenting clinical information in the electronic or other health record, Independently interpreting resultsand communicating results to the patient/family/caregiver, or  Care coordination.

## 2025-08-07 ENCOUNTER — TELEPHONE (OUTPATIENT)
Dept: UROLOGY | Facility: CLINIC | Age: 65
End: 2025-08-07
Payer: MEDICAID

## 2025-08-07 NOTE — TELEPHONE ENCOUNTER
Callback to pt she states that she is currently in hospital voiding a lot of blood the last couple of days. She states that she had a CT, looked into at that time was not yet resulted. Advised to advise ER of Urologist, per results they will call if need be and [place] is on call. Discussed last visit, pt was supposed to proceed with Diagnostic URS, pt states she became ill around that time. Advised that she may need a f/u to discuss and proceed with POC. Pt verbalized understanding. BJP    Copied from CRM #[id number]. Topic: Escalation - Escalation To Clinic  >> Aug 7, 2025  9:06 AM Candy wrote:  Pt dgtr Deborah calling for mother for hospital follow up stating pt bleeding heavily and [place].  Dgtr stating she is going back to hospital.  Pt can be reached at [phone].  >> Aug 7, 2025  4:38 PM Shikha wrote:    ----- Message -----  From: Candy Abraham  Sent: 8/7/2025   9:09 AM CDT  To: Mansi Kimball RT; Sunni Lynch Staff

## 2025-08-11 ENCOUNTER — TELEPHONE (OUTPATIENT)
Dept: UROLOGY | Facility: CLINIC | Age: 65
End: 2025-08-11
Payer: MEDICAID

## 2025-08-12 ENCOUNTER — OFFICE VISIT (OUTPATIENT)
Dept: UROLOGY | Facility: CLINIC | Age: 65
End: 2025-08-12
Payer: MEDICAID

## 2025-08-12 VITALS
WEIGHT: 180 LBS | SYSTOLIC BLOOD PRESSURE: 130 MMHG | HEIGHT: 61 IN | RESPIRATION RATE: 19 BRPM | HEART RATE: 79 BPM | DIASTOLIC BLOOD PRESSURE: 64 MMHG | BODY MASS INDEX: 33.99 KG/M2

## 2025-08-12 DIAGNOSIS — N28.89 RENAL MASS: Primary | ICD-10-CM

## 2025-08-12 LAB
BILIRUBIN, UA POC OHS: ABNORMAL
BLOOD, UA POC OHS: ABNORMAL
CLARITY, UA POC OHS: ABNORMAL
COLOR, UA POC OHS: ABNORMAL
GLUCOSE, UA POC OHS: NEGATIVE
KETONES, UA POC OHS: NEGATIVE
LEUKOCYTES, UA POC OHS: ABNORMAL
NITRITE, UA POC OHS: NEGATIVE
PH, UA POC OHS: 5.5
PROTEIN, UA POC OHS: 100
SPECIFIC GRAVITY, UA POC OHS: 1.02
UROBILINOGEN, UA POC OHS: 1

## 2025-08-12 PROCEDURE — 99215 OFFICE O/P EST HI 40 MIN: CPT | Mod: S$PBB,,, | Performed by: UROLOGY

## 2025-08-12 PROCEDURE — 3078F DIAST BP <80 MM HG: CPT | Mod: CPTII,,, | Performed by: UROLOGY

## 2025-08-12 PROCEDURE — 3008F BODY MASS INDEX DOCD: CPT | Mod: CPTII,,, | Performed by: UROLOGY

## 2025-08-12 PROCEDURE — 3075F SYST BP GE 130 - 139MM HG: CPT | Mod: CPTII,,, | Performed by: UROLOGY

## 2025-08-12 PROCEDURE — 1159F MED LIST DOCD IN RCRD: CPT | Mod: CPTII,,, | Performed by: UROLOGY

## 2025-08-12 PROCEDURE — 3044F HG A1C LEVEL LT 7.0%: CPT | Mod: CPTII,,, | Performed by: UROLOGY

## 2025-08-12 RX ORDER — CIPROFLOXACIN 500 MG/1
500 TABLET, FILM COATED ORAL EVERY 12 HOURS
COMMUNITY
Start: 2025-08-07 | End: 2025-08-14

## 2025-08-15 ENCOUNTER — TELEPHONE (OUTPATIENT)
Dept: UROLOGY | Facility: CLINIC | Age: 65
End: 2025-08-15
Payer: MEDICAID

## 2025-08-27 ENCOUNTER — TELEPHONE (OUTPATIENT)
Dept: UROLOGY | Facility: CLINIC | Age: 65
End: 2025-08-27

## 2025-08-27 ENCOUNTER — OUTSIDE PLACE OF SERVICE (OUTPATIENT)
Dept: UROLOGY | Facility: CLINIC | Age: 65
End: 2025-08-27

## 2025-08-27 DIAGNOSIS — N23 KIDNEY PAIN: Primary | ICD-10-CM

## 2025-08-27 DIAGNOSIS — N39.0 URINARY TRACT INFECTION WITHOUT HEMATURIA, SITE UNSPECIFIED: ICD-10-CM

## 2025-08-27 DIAGNOSIS — N28.89 RENAL MASS: ICD-10-CM

## 2025-08-27 RX ORDER — ONDANSETRON 8 MG/1
8 TABLET, ORALLY DISINTEGRATING ORAL EVERY 8 HOURS PRN
Qty: 12 TABLET | Refills: 0 | Status: SHIPPED | OUTPATIENT
Start: 2025-08-27

## 2025-08-27 RX ORDER — HYDROCODONE BITARTRATE AND ACETAMINOPHEN 10; 325 MG/1; MG/1
1 TABLET ORAL EVERY 6 HOURS PRN
Qty: 12 TABLET | Refills: 0 | Status: SHIPPED | OUTPATIENT
Start: 2025-08-27

## 2025-08-27 RX ORDER — CIPROFLOXACIN 500 MG/1
500 TABLET, FILM COATED ORAL 2 TIMES DAILY
Qty: 6 TABLET | Refills: 0 | Status: SHIPPED | OUTPATIENT
Start: 2025-08-27 | End: 2025-08-30

## 2025-09-03 ENCOUNTER — TELEPHONE (OUTPATIENT)
Dept: UROLOGY | Facility: CLINIC | Age: 65
End: 2025-09-03
Payer: MEDICAID

## 2025-09-04 ENCOUNTER — TELEPHONE (OUTPATIENT)
Dept: UROLOGY | Facility: CLINIC | Age: 65
End: 2025-09-04
Payer: MEDICAID

## 2025-09-04 DIAGNOSIS — N28.89 RENAL MASS: Primary | ICD-10-CM

## 2025-09-04 RX ORDER — HYDROCODONE BITARTRATE AND ACETAMINOPHEN 10; 325 MG/1; MG/1
1 TABLET ORAL EVERY 6 HOURS PRN
Qty: 5 TABLET | Refills: 0 | Status: SHIPPED | OUTPATIENT
Start: 2025-09-04

## 2025-09-05 RX ORDER — OXYBUTYNIN CHLORIDE 5 MG/1
5 TABLET ORAL 3 TIMES DAILY
Qty: 30 TABLET | Refills: 1 | Status: SHIPPED | OUTPATIENT
Start: 2025-09-05